# Patient Record
Sex: MALE | Race: WHITE | NOT HISPANIC OR LATINO
[De-identification: names, ages, dates, MRNs, and addresses within clinical notes are randomized per-mention and may not be internally consistent; named-entity substitution may affect disease eponyms.]

---

## 2020-04-16 ENCOUNTER — APPOINTMENT (OUTPATIENT)
Dept: ORTHOPEDIC SURGERY | Facility: CLINIC | Age: 60
End: 2020-04-16

## 2020-05-06 ENCOUNTER — APPOINTMENT (OUTPATIENT)
Dept: ORTHOPEDIC SURGERY | Facility: CLINIC | Age: 60
End: 2020-05-06

## 2020-05-13 ENCOUNTER — APPOINTMENT (OUTPATIENT)
Dept: ORTHOPEDIC SURGERY | Facility: CLINIC | Age: 60
End: 2020-05-13
Payer: MEDICARE

## 2020-05-13 VITALS
HEART RATE: 75 BPM | TEMPERATURE: 97.7 F | SYSTOLIC BLOOD PRESSURE: 143 MMHG | BODY MASS INDEX: 39.69 KG/M2 | DIASTOLIC BLOOD PRESSURE: 87 MMHG | HEIGHT: 69 IN | WEIGHT: 268 LBS

## 2020-05-13 DIAGNOSIS — M25.511 PAIN IN RIGHT SHOULDER: ICD-10-CM

## 2020-05-13 DIAGNOSIS — M75.81 OTHER SHOULDER LESIONS, RIGHT SHOULDER: ICD-10-CM

## 2020-05-13 PROCEDURE — 99204 OFFICE O/P NEW MOD 45 MIN: CPT

## 2020-05-13 PROCEDURE — 73030 X-RAY EXAM OF SHOULDER: CPT | Mod: RT

## 2020-05-27 ENCOUNTER — APPOINTMENT (OUTPATIENT)
Dept: ULTRASOUND IMAGING | Facility: CLINIC | Age: 60
End: 2020-05-27
Payer: MEDICARE

## 2020-05-27 ENCOUNTER — RESULT REVIEW (OUTPATIENT)
Age: 60
End: 2020-05-27

## 2020-05-27 ENCOUNTER — OUTPATIENT (OUTPATIENT)
Dept: OUTPATIENT SERVICES | Facility: HOSPITAL | Age: 60
LOS: 1 days | End: 2020-05-27
Payer: MEDICARE

## 2020-05-27 DIAGNOSIS — Z98.1 ARTHRODESIS STATUS: Chronic | ICD-10-CM

## 2020-05-27 DIAGNOSIS — Z09 ENCOUNTER FOR FOLLOW-UP EXAMINATION AFTER COMPLETED TREATMENT FOR CONDITIONS OTHER THAN MALIGNANT NEOPLASM: Chronic | ICD-10-CM

## 2020-05-27 DIAGNOSIS — M19.011 PRIMARY OSTEOARTHRITIS, RIGHT SHOULDER: ICD-10-CM

## 2020-05-27 DIAGNOSIS — Z98.89 OTHER SPECIFIED POSTPROCEDURAL STATES: Chronic | ICD-10-CM

## 2020-05-27 PROCEDURE — 20611 DRAIN/INJ JOINT/BURSA W/US: CPT

## 2020-05-27 PROCEDURE — 20611 DRAIN/INJ JOINT/BURSA W/US: CPT | Mod: RT

## 2020-12-04 ENCOUNTER — RX RENEWAL (OUTPATIENT)
Age: 60
End: 2020-12-04

## 2021-01-26 ENCOUNTER — APPOINTMENT (OUTPATIENT)
Dept: ORTHOPEDIC SURGERY | Facility: CLINIC | Age: 61
End: 2021-01-26
Payer: MEDICARE

## 2021-01-26 PROCEDURE — 73030 X-RAY EXAM OF SHOULDER: CPT | Mod: RT

## 2021-01-26 PROCEDURE — 99214 OFFICE O/P EST MOD 30 MIN: CPT

## 2021-01-27 NOTE — HISTORY OF PRESENT ILLNESS
[Worsening] : worsening [9] : an average pain level of 9/10 [8] : a minimum pain level of 8/10 [10] : a maximum pain level of 10/10 [Intermit.] : ~He/She~ states the symptoms seem to be intermittent [Lifting] : worsened by lifting [Rest] : relieved by rest [___ mths] : [unfilled] month(s) ago [de-identified] : Mr. BULL is a pleasant 60 year old male HD[L] who presents with Right shoulder pain. He has trouble with any over the head modalities as well as heavy lifting.  Pain is progressively worsened since October 2019.  He has difficulty elevating shoulder above 90 degrees flexion or abduction secondary to pain and stiffness.  Pain is localized to the anterolateral portion of the shoulder radiates down his arm.  Pain wakes him up at night.  He also admits to numbness that travels down his anterior arm to his elbow. He denies performing PT for this issue.  He also denies taking medications recently.  Patient received an US guided cortisone injection which did not provide him any relief.  He also took Diclofenac which did not provide him much relief either.\par \par Currently, the patient has failed all conservative treatment options and is considering surgical intervention.\par  [NSAIDs] : not relieved by nonsteroidal anti-inflammatory drugs [de-identified] : lying down

## 2021-01-27 NOTE — PHYSICAL EXAM
[de-identified] : Physical Exam:\par General: Well appearing, no acute distress, A&Ox3\par Neurologic: No focal deficits\par Head: NCAT without abrasions, lacerations, or ecchymosis to head, face, or scalp\par Eyes: No scleral icterus, no gross abnormalities\par Respiratory: Equal chest wall expansion bilaterally, no accessory muscle use\par Lymphatic: No lymphadenopathy palpated\par Skin: Warm and dry\par Psychiatric: Normal mood and affect\par \par C-Spine\par Palpation: Tenderness to paraspinal muscles and trapezius muscle\par ROM: side bending, symmetrical. Pain with extension and flexion of the neck.\par Reflexes: C5-7 [normal]\par \par Right Shoulder\par ·	Inspection/Palpation: No tenderness, no crepitus, no deformities\par ·	Range of Motion: no crepitus with ROM; Active ; ER at side 10; IR to L1; Passive , ER at side 20, IR to L1\par ·	Strength: forward elevation in scapular plane [4/5], internal rotation [4/5], external rotation [4/5], adduction [4/5] and abduction [4/5]\par ·	Stability: no joint instability on provocative testing\par ·	Tests: Mcdonald test positive, Neer positive, positive drop arm test secondary to pain, bear hug test positive, Napolean sign POS, cross arm adduction positive, lift off sign positive, hornblowers sign POS, speeds test negative, Yergason's test negative, Epperson's Active Compression test negative, whipple test positive, bicep's load II test negative\par \par Left Shoulder\par ·	Inspection/Palpation: no tenderness, swelling or deformities\par ·	Range of Motion: full and painless in all planes, no crepitus\par ·	Strength: forward elevation in scapular plane 5/5, internal rotation 5/5, external rotation 5/5, adduction 5/5 and abduction 5/5\par ·	Stability: no joint instability on provocative testing\par Tests: Mcdonald test negative, Neer sign negative, negative drop arm test secondary to pain, bear hug test negative, Napolean sign negative, cross arm adduction negative, lift off sign positive, hornblowers sign negative, speeds test negative, Yergason's test negative, no bicipital groove tenderness, Epperson's Active Compression test negative [de-identified] : The following radiographs were ordered and read by me during this patients visit. I reviewed each radiograph in detail with the patient and discussed the findings as highlighted below. \par \par 4 views of the right shoulder show no fracture, dislocation or bony lesions. There is evidence of degenerative change in the glenohumeral and acromioclavicular joints with joint space narrowing and osteophyte formation.

## 2021-01-27 NOTE — DISCUSSION/SUMMARY
[de-identified] : Mr. BULL is a pleasant 60 year old male HD[L] who presents with chronic right shoulder pain secondary to OA and RTC tendonitis. Patient has tried and failed all conservative treatment options. Patient is considering surgical treatment . All the risks and benefits of an arthroscopic shoulder replacement were discussed with the patient. Risks include, but are not limited to, infection, bleeding, dislocation, nerve injury, blood clots and other possible medical complications, which were discussed with the patient. Also the risks of possible readmission were discussed with the patient. Patient completely understands all risks and benefits. The need for postoperative DVT prophylaxis discussed with the patient as well. The patient was given no assurances that all the symptoms will be alleviated. Patient completely understands all this. He has been advised to get medical clearance before the procedure, in order to decrease complication risk. Patient was also advised to attend a joint replacement education class.  I had my surgical coordinator meet with the patient to discuss surgery dates.\par \par We will discuss his MRI and CT scan results with him to determine if a reverse versus total shoulder replacement is indicated. MR will allow us to evaluate for RTC tearing versus arthropathy and CT scan will evaluate degree of bony deformity. He agrees with the above plan and all questions were answered.\par \par

## 2021-01-27 NOTE — REVIEW OF SYSTEMS
[Joint Pain] : joint pain [Joint Stiffness] : joint stiffness [Joint Swelling] : joint swelling [Negative] : Heme/Lymph [[id number]] : right shoulder

## 2021-02-04 ENCOUNTER — APPOINTMENT (OUTPATIENT)
Dept: MRI IMAGING | Facility: CLINIC | Age: 61
End: 2021-02-04
Payer: MEDICARE

## 2021-02-04 ENCOUNTER — RESULT REVIEW (OUTPATIENT)
Age: 61
End: 2021-02-04

## 2021-02-04 ENCOUNTER — APPOINTMENT (OUTPATIENT)
Dept: CT IMAGING | Facility: CLINIC | Age: 61
End: 2021-02-04
Payer: MEDICARE

## 2021-02-04 ENCOUNTER — OUTPATIENT (OUTPATIENT)
Dept: OUTPATIENT SERVICES | Facility: HOSPITAL | Age: 61
LOS: 1 days | End: 2021-02-04
Payer: MEDICARE

## 2021-02-04 DIAGNOSIS — Z00.8 ENCOUNTER FOR OTHER GENERAL EXAMINATION: ICD-10-CM

## 2021-02-04 DIAGNOSIS — Z98.1 ARTHRODESIS STATUS: Chronic | ICD-10-CM

## 2021-02-04 DIAGNOSIS — Z98.89 OTHER SPECIFIED POSTPROCEDURAL STATES: Chronic | ICD-10-CM

## 2021-02-04 DIAGNOSIS — Z09 ENCOUNTER FOR FOLLOW-UP EXAMINATION AFTER COMPLETED TREATMENT FOR CONDITIONS OTHER THAN MALIGNANT NEOPLASM: Chronic | ICD-10-CM

## 2021-02-04 PROCEDURE — 73200 CT UPPER EXTREMITY W/O DYE: CPT | Mod: 26,RT,MH

## 2021-02-04 PROCEDURE — 73221 MRI JOINT UPR EXTREM W/O DYE: CPT | Mod: 26,RT,MH

## 2021-02-04 PROCEDURE — 73200 CT UPPER EXTREMITY W/O DYE: CPT

## 2021-02-04 PROCEDURE — 73221 MRI JOINT UPR EXTREM W/O DYE: CPT

## 2021-02-19 ENCOUNTER — NON-APPOINTMENT (OUTPATIENT)
Age: 61
End: 2021-02-19

## 2021-03-03 ENCOUNTER — OUTPATIENT (OUTPATIENT)
Dept: OUTPATIENT SERVICES | Facility: HOSPITAL | Age: 61
LOS: 1 days | End: 2021-03-03
Payer: MEDICARE

## 2021-03-03 VITALS
OXYGEN SATURATION: 100 % | SYSTOLIC BLOOD PRESSURE: 137 MMHG | RESPIRATION RATE: 16 BRPM | HEART RATE: 72 BPM | WEIGHT: 270.07 LBS | HEIGHT: 66 IN | TEMPERATURE: 98 F | DIASTOLIC BLOOD PRESSURE: 89 MMHG

## 2021-03-03 DIAGNOSIS — Z98.890 OTHER SPECIFIED POSTPROCEDURAL STATES: Chronic | ICD-10-CM

## 2021-03-03 DIAGNOSIS — Z29.9 ENCOUNTER FOR PROPHYLACTIC MEASURES, UNSPECIFIED: ICD-10-CM

## 2021-03-03 DIAGNOSIS — Z96.651 PRESENCE OF RIGHT ARTIFICIAL KNEE JOINT: Chronic | ICD-10-CM

## 2021-03-03 DIAGNOSIS — Z98.89 OTHER SPECIFIED POSTPROCEDURAL STATES: Chronic | ICD-10-CM

## 2021-03-03 DIAGNOSIS — M12.811 OTHER SPECIFIC ARTHROPATHIES, NOT ELSEWHERE CLASSIFIED, RIGHT SHOULDER: ICD-10-CM

## 2021-03-03 DIAGNOSIS — Z09 ENCOUNTER FOR FOLLOW-UP EXAMINATION AFTER COMPLETED TREATMENT FOR CONDITIONS OTHER THAN MALIGNANT NEOPLASM: Chronic | ICD-10-CM

## 2021-03-03 DIAGNOSIS — Z01.818 ENCOUNTER FOR OTHER PREPROCEDURAL EXAMINATION: ICD-10-CM

## 2021-03-03 DIAGNOSIS — Z98.1 ARTHRODESIS STATUS: Chronic | ICD-10-CM

## 2021-03-03 LAB
A1C WITH ESTIMATED AVERAGE GLUCOSE RESULT: 5.6 % — SIGNIFICANT CHANGE UP (ref 4–5.6)
ALBUMIN SERPL ELPH-MCNC: 3.9 G/DL — SIGNIFICANT CHANGE UP (ref 3.3–5)
ANION GAP SERPL CALC-SCNC: 1 MMOL/L — LOW (ref 5–17)
APTT BLD: 32.4 SEC — SIGNIFICANT CHANGE UP (ref 27.5–35.5)
BASOPHILS # BLD AUTO: 0.05 K/UL — SIGNIFICANT CHANGE UP (ref 0–0.2)
BASOPHILS NFR BLD AUTO: 0.7 % — SIGNIFICANT CHANGE UP (ref 0–2)
BUN SERPL-MCNC: 17 MG/DL — SIGNIFICANT CHANGE UP (ref 7–23)
CALCIUM SERPL-MCNC: 9.4 MG/DL — SIGNIFICANT CHANGE UP (ref 8.5–10.1)
CHLORIDE SERPL-SCNC: 105 MMOL/L — SIGNIFICANT CHANGE UP (ref 96–108)
CO2 SERPL-SCNC: 32 MMOL/L — HIGH (ref 22–31)
CREAT SERPL-MCNC: 0.84 MG/DL — SIGNIFICANT CHANGE UP (ref 0.5–1.3)
EOSINOPHIL # BLD AUTO: 0.1 K/UL — SIGNIFICANT CHANGE UP (ref 0–0.5)
EOSINOPHIL NFR BLD AUTO: 1.4 % — SIGNIFICANT CHANGE UP (ref 0–6)
ESTIMATED AVERAGE GLUCOSE: 114 MG/DL — SIGNIFICANT CHANGE UP (ref 68–114)
GLUCOSE SERPL-MCNC: 108 MG/DL — HIGH (ref 70–99)
HCT VFR BLD CALC: 43.6 % — SIGNIFICANT CHANGE UP (ref 39–50)
HGB BLD-MCNC: 14.2 G/DL — SIGNIFICANT CHANGE UP (ref 13–17)
IMM GRANULOCYTES NFR BLD AUTO: 0.4 % — SIGNIFICANT CHANGE UP (ref 0–1.5)
INR BLD: 1.04 RATIO — SIGNIFICANT CHANGE UP (ref 0.88–1.16)
LYMPHOCYTES # BLD AUTO: 1.71 K/UL — SIGNIFICANT CHANGE UP (ref 1–3.3)
LYMPHOCYTES # BLD AUTO: 24.1 % — SIGNIFICANT CHANGE UP (ref 13–44)
MCHC RBC-ENTMCNC: 31.2 PG — SIGNIFICANT CHANGE UP (ref 27–34)
MCHC RBC-ENTMCNC: 32.6 GM/DL — SIGNIFICANT CHANGE UP (ref 32–36)
MCV RBC AUTO: 95.8 FL — SIGNIFICANT CHANGE UP (ref 80–100)
MONOCYTES # BLD AUTO: 0.46 K/UL — SIGNIFICANT CHANGE UP (ref 0–0.9)
MONOCYTES NFR BLD AUTO: 6.5 % — SIGNIFICANT CHANGE UP (ref 2–14)
MRSA PCR RESULT.: SIGNIFICANT CHANGE UP
NEUTROPHILS # BLD AUTO: 4.75 K/UL — SIGNIFICANT CHANGE UP (ref 1.8–7.4)
NEUTROPHILS NFR BLD AUTO: 66.9 % — SIGNIFICANT CHANGE UP (ref 43–77)
PLATELET # BLD AUTO: 220 K/UL — SIGNIFICANT CHANGE UP (ref 150–400)
POTASSIUM SERPL-MCNC: 4.4 MMOL/L — SIGNIFICANT CHANGE UP (ref 3.5–5.3)
POTASSIUM SERPL-SCNC: 4.4 MMOL/L — SIGNIFICANT CHANGE UP (ref 3.5–5.3)
PROTHROM AB SERPL-ACNC: 12.1 SEC — SIGNIFICANT CHANGE UP (ref 10.6–13.6)
RBC # BLD: 4.55 M/UL — SIGNIFICANT CHANGE UP (ref 4.2–5.8)
RBC # FLD: 12.5 % — SIGNIFICANT CHANGE UP (ref 10.3–14.5)
S AUREUS DNA NOSE QL NAA+PROBE: DETECTED
SODIUM SERPL-SCNC: 138 MMOL/L — SIGNIFICANT CHANGE UP (ref 135–145)
WBC # BLD: 7.1 K/UL — SIGNIFICANT CHANGE UP (ref 3.8–10.5)
WBC # FLD AUTO: 7.1 K/UL — SIGNIFICANT CHANGE UP (ref 3.8–10.5)

## 2021-03-03 PROCEDURE — 80048 BASIC METABOLIC PNL TOTAL CA: CPT

## 2021-03-03 PROCEDURE — G0463: CPT | Mod: 25

## 2021-03-03 PROCEDURE — 85730 THROMBOPLASTIN TIME PARTIAL: CPT

## 2021-03-03 PROCEDURE — 36415 COLL VENOUS BLD VENIPUNCTURE: CPT

## 2021-03-03 PROCEDURE — 87640 STAPH A DNA AMP PROBE: CPT

## 2021-03-03 PROCEDURE — 82040 ASSAY OF SERUM ALBUMIN: CPT

## 2021-03-03 PROCEDURE — 85025 COMPLETE CBC W/AUTO DIFF WBC: CPT

## 2021-03-03 PROCEDURE — 93005 ELECTROCARDIOGRAM TRACING: CPT

## 2021-03-03 PROCEDURE — 87641 MR-STAPH DNA AMP PROBE: CPT

## 2021-03-03 PROCEDURE — 93010 ELECTROCARDIOGRAM REPORT: CPT

## 2021-03-03 PROCEDURE — 85610 PROTHROMBIN TIME: CPT

## 2021-03-03 PROCEDURE — 83036 HEMOGLOBIN GLYCOSYLATED A1C: CPT

## 2021-03-03 PROCEDURE — 86900 BLOOD TYPING SEROLOGIC ABO: CPT

## 2021-03-03 PROCEDURE — 86901 BLOOD TYPING SEROLOGIC RH(D): CPT

## 2021-03-03 PROCEDURE — 86850 RBC ANTIBODY SCREEN: CPT

## 2021-03-03 NOTE — H&P PST ADULT - NSICDXPASTSURGICALHX_GEN_ALL_CORE_FT
PAST SURGICAL HISTORY:  H/O cardiac catheterization 7-8 yr ago---normal    H/O umbilical hernia repair 2005    S/P inguinal hernia repair right--1988    S/P lumbar fusion L3-L4  ---2015    S/P total knee replacement, right 2018

## 2021-03-03 NOTE — H&P PST ADULT - HISTORY OF PRESENT ILLNESS
60 y.o obese male presents for PST with hx of right shoulder pain. Patient retired , injury  60 y.o obese male presents for PST with hx of right shoulder pain. 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 He has had at least two injuries on his right shoulder over the years. Patient reports right shoulder pain for years but has progressively worsened over the past year, severely limiting his ROM . He has followed with ortho and states diagnostics revealed bone spurs, advanced arthritis. He has treated pain conservatively with no relief. Pain currently impacting his daily activities and sleep. Patient opting for surgical intervention. Scheduled for Right Total Shoulder Replacement

## 2021-03-03 NOTE — H&P PST ADULT - ATTENDING COMMENTS
Orthopedic Sports Attending:    Agree with above resident/PA note.  Note edited where necessary.      Patient seen and examined at bedside. Discussed the risks, complications, benefits and alternatives of care. Confirmed procedure and site. Patient fully understands and would like to proceed with surgery.    Nilton Chaudhary, DO  Orthopaedic Surgery

## 2021-03-03 NOTE — H&P PST ADULT - NSICDXPASTMEDICALHX_GEN_ALL_CORE_FT
PAST MEDICAL HISTORY:  Arthritis     Asthma WTC responder--followed by [facility] monitoring center    GERD (gastroesophageal reflux disease)     H/O melanoma excision Left thigh    H/O squamous cell carcinoma excision left hand    History of Thomson's esophagus     Hyperlipidemia     Sleep apnea CPAP

## 2021-03-03 NOTE — H&P PST ADULT - RESPIRATORY AND THORAX COMMENTS
WTC responder, has developed Asthma, followed by pulmonary Dr Agee every 4 months & [facility] monitoring center- on daily inhalers

## 2021-03-03 NOTE — H&P PST ADULT - TEACHING/LEARNING LEARNING PREFERENCES
audio/computer/internet/group instruction/individual instruction/pictorial/skill demonstration/verbal instruction/video

## 2021-03-03 NOTE — H&P PST ADULT - ASSESSMENT
60 y.o male scheduled for  60 y.o male scheduled for  Right Total Shoulder Replacement   Plan  1. Stop all NSAIDS, herbal supplements and vitamins for 7 days.  2. NPO at midnight.  3. Take the following medications Omeprazole  with small sips of water on the morning of your procedure/surgery. Use Advair   4. Use EZ sponges as directed  5. Use mupirocin as directed  6. Labs, EKG as per surgeon  7. PMD Dr Sher Louise visit for optimization prior to surgery as per surgeon.  8. COVID swab appt: 3/7/2021    Denies travel outside of state or country in the last 14 days.   Denies contact with known Coronavirus positive person.  Denies fever, chills, cough, congestion, runny nose, SOB or difficulty breathing, fatigue, muscle or body ache, headache. loss of taste or smell, N/V/D.    CAPRINI SCORE    AGE RELATED RISK FACTORS                                                       MOBILITY RELATED FACTORS  [ ] Age 41-60 years                                            (1 Point)                  [ ] Bed rest                                                        (1 Point)  [x ] Age: 61-74 years                                           (2 Points)                [ ] Plaster cast                                                   (2 Points)  [ ] Age= 75 years                                              (3 Points)                 [ ] Bed bound for more than 72 hours                   (2 Points)    DISEASE RELATED RISK FACTORS                                               GENDER SPECIFIC FACTORS  [x Edema in the lower extremities                       (1 Point)                  [ ] Pregnancy                                                     (1 Point)  [ ] Varicose veins                                               (1 Point)                  [ ] Post-partum < 6 weeks                                   (1 Point)             [x ] BMI > 25 Kg/m2                                            (1 Point)                  [ ] Hormonal therapy  or oral contraception            (1 Point)                 [ ] Sepsis (in the previous month)                        (1 Point)                  [ ] History of pregnancy complications  [ ] Pneumonia or serious lung disease                                               [ ] Unexplained or recurrent                       (1 Point)           (in the previous month)                               (1 Point)  [ ] Abnormal pulmonary function test                     (1 Point)                 SURGERY RELATED RISK FACTORS  [ ] Acute myocardial infarction                              (1 Point)                 [ ]  Section                                            (1 Point)  [ ] Congestive heart failure (in the previous month)  (1 Point)                 [ ] Minor surgery                                                 (1 Point)   [ ] Inflammatory bowel disease                             (1 Point)                 [ ] Arthroscopic surgery                                        (2 Points)  [ ] Central venous access                                    (2 Points)                [ ] General surgery lasting more than 45 minutes   (2 Points)       [ ] Stroke (in the previous month)                          (5 Points)               [x ] Elective arthroplasty                                        (5 Points)                                                                                                                                               HEMATOLOGY RELATED FACTORS                                                 TRAUMA RELATED RISK FACTORS  [ ] Prior episodes of VTE                                     (3 Points)                 [ ] Fracture of the hip, pelvis, or leg                       (5 Points)  [ ] Positive family history for VTE                         (3 Points)                 [ ] Acute spinal cord injury (in the previous month)  (5 Points)  [ ] Prothrombin 39936 A                                      (3 Points)                 [ ] Paralysis  (less than 1 month)                          (5 Points)  [ ] Factor V Leiden                                             (3 Points)                 [ ] Multiple Trauma within 1 month                         (5 Points)  [ ] Lupus anticoagulants                                     (3 Points)                                                           [ ] Anticardiolipin antibodies                                (3 Points)                                                       [ ] High homocysteine in the blood                      (3 Points)                                             [ ] Other congenital or acquired thrombophilia       (3 Points)                                                [ ] Heparin induced thrombocytopenia                  (3 Points)                                          Total Score [    9      ]    The Caprini score indicates that this patient is at high risk for a VTE event (score > = 6).    Ssurgical patients in this group will benefit from both pharmacologic prophylaxis and intermittent compression devices.    The surgical team will determine the balance between VTE risk and bleeding risk, and other clinical considerations.

## 2021-03-03 NOTE — H&P PST ADULT - NSICDXFAMILYHX_GEN_ALL_CORE_FT
FAMILY HISTORY:  Mother  Still living? Yes, Estimated age: 71-80  Family history of hyperlipidemia, Age at diagnosis: Age Unknown

## 2021-03-03 NOTE — H&P PST ADULT - SKIN/BREAST COMMENTS
Follows with Dermatology regularly and [facility]--hx melanoma & squamous cell carcinoma

## 2021-03-04 DIAGNOSIS — Z01.818 ENCOUNTER FOR OTHER PREPROCEDURAL EXAMINATION: ICD-10-CM

## 2021-03-04 DIAGNOSIS — M12.811 OTHER SPECIFIC ARTHROPATHIES, NOT ELSEWHERE CLASSIFIED, RIGHT SHOULDER: ICD-10-CM

## 2021-03-07 ENCOUNTER — APPOINTMENT (OUTPATIENT)
Dept: DISASTER EMERGENCY | Facility: CLINIC | Age: 61
End: 2021-03-07

## 2021-03-07 LAB — SARS-COV-2 N GENE NPH QL NAA+PROBE: NOT DETECTED

## 2021-03-10 ENCOUNTER — OUTPATIENT (OUTPATIENT)
Dept: INPATIENT UNIT | Facility: HOSPITAL | Age: 61
LOS: 1 days | Discharge: HOME | End: 2021-03-10
Payer: MEDICARE

## 2021-03-10 ENCOUNTER — APPOINTMENT (OUTPATIENT)
Dept: ORTHOPEDIC SURGERY | Facility: HOSPITAL | Age: 61
End: 2021-03-10

## 2021-03-10 ENCOUNTER — RESULT REVIEW (OUTPATIENT)
Age: 61
End: 2021-03-10

## 2021-03-10 VITALS
OXYGEN SATURATION: 100 % | RESPIRATION RATE: 16 BRPM | DIASTOLIC BLOOD PRESSURE: 80 MMHG | HEIGHT: 66 IN | WEIGHT: 270.07 LBS | SYSTOLIC BLOOD PRESSURE: 130 MMHG | TEMPERATURE: 97 F | HEART RATE: 66 BPM

## 2021-03-10 DIAGNOSIS — Z98.89 OTHER SPECIFIED POSTPROCEDURAL STATES: Chronic | ICD-10-CM

## 2021-03-10 DIAGNOSIS — M19.011 PRIMARY OSTEOARTHRITIS, RIGHT SHOULDER: ICD-10-CM

## 2021-03-10 DIAGNOSIS — Z98.1 ARTHRODESIS STATUS: Chronic | ICD-10-CM

## 2021-03-10 DIAGNOSIS — Z98.890 OTHER SPECIFIED POSTPROCEDURAL STATES: Chronic | ICD-10-CM

## 2021-03-10 DIAGNOSIS — Z96.651 PRESENCE OF RIGHT ARTIFICIAL KNEE JOINT: Chronic | ICD-10-CM

## 2021-03-10 DIAGNOSIS — M12.811 OTHER SPECIFIC ARTHROPATHIES, NOT ELSEWHERE CLASSIFIED, RIGHT SHOULDER: ICD-10-CM

## 2021-03-10 LAB
ANION GAP SERPL CALC-SCNC: 4 MMOL/L — LOW (ref 5–17)
BUN SERPL-MCNC: 12 MG/DL — SIGNIFICANT CHANGE UP (ref 7–23)
CALCIUM SERPL-MCNC: 8.6 MG/DL — SIGNIFICANT CHANGE UP (ref 8.5–10.1)
CHLORIDE SERPL-SCNC: 104 MMOL/L — SIGNIFICANT CHANGE UP (ref 96–108)
CO2 SERPL-SCNC: 29 MMOL/L — SIGNIFICANT CHANGE UP (ref 22–31)
CREAT SERPL-MCNC: 0.98 MG/DL — SIGNIFICANT CHANGE UP (ref 0.5–1.3)
GLUCOSE SERPL-MCNC: 129 MG/DL — HIGH (ref 70–99)
HCT VFR BLD CALC: 39.9 % — SIGNIFICANT CHANGE UP (ref 39–50)
HGB BLD-MCNC: 13.3 G/DL — SIGNIFICANT CHANGE UP (ref 13–17)
MCHC RBC-ENTMCNC: 31.4 PG — SIGNIFICANT CHANGE UP (ref 27–34)
MCHC RBC-ENTMCNC: 33.3 GM/DL — SIGNIFICANT CHANGE UP (ref 32–36)
MCV RBC AUTO: 94.3 FL — SIGNIFICANT CHANGE UP (ref 80–100)
PLATELET # BLD AUTO: 180 K/UL — SIGNIFICANT CHANGE UP (ref 150–400)
POTASSIUM SERPL-MCNC: 4.5 MMOL/L — SIGNIFICANT CHANGE UP (ref 3.5–5.3)
POTASSIUM SERPL-SCNC: 4.5 MMOL/L — SIGNIFICANT CHANGE UP (ref 3.5–5.3)
RBC # BLD: 4.23 M/UL — SIGNIFICANT CHANGE UP (ref 4.2–5.8)
RBC # FLD: 12.2 % — SIGNIFICANT CHANGE UP (ref 10.3–14.5)
SODIUM SERPL-SCNC: 137 MMOL/L — SIGNIFICANT CHANGE UP (ref 135–145)
WBC # BLD: 9.16 K/UL — SIGNIFICANT CHANGE UP (ref 3.8–10.5)
WBC # FLD AUTO: 9.16 K/UL — SIGNIFICANT CHANGE UP (ref 3.8–10.5)

## 2021-03-10 PROCEDURE — 99204 OFFICE O/P NEW MOD 45 MIN: CPT

## 2021-03-10 PROCEDURE — 73030 X-RAY EXAM OF SHOULDER: CPT | Mod: RT

## 2021-03-10 PROCEDURE — 97163 PT EVAL HIGH COMPLEX 45 MIN: CPT | Mod: GP

## 2021-03-10 PROCEDURE — 36415 COLL VENOUS BLD VENIPUNCTURE: CPT

## 2021-03-10 PROCEDURE — 82962 GLUCOSE BLOOD TEST: CPT

## 2021-03-10 PROCEDURE — C1776: CPT

## 2021-03-10 PROCEDURE — 85027 COMPLETE CBC AUTOMATED: CPT

## 2021-03-10 PROCEDURE — 97116 GAIT TRAINING THERAPY: CPT | Mod: GP

## 2021-03-10 PROCEDURE — C9399: CPT

## 2021-03-10 PROCEDURE — 23472 RECONSTRUCT SHOULDER JOINT: CPT | Mod: RT

## 2021-03-10 PROCEDURE — 73030 X-RAY EXAM OF SHOULDER: CPT | Mod: 26,RT

## 2021-03-10 PROCEDURE — 80048 BASIC METABOLIC PNL TOTAL CA: CPT

## 2021-03-10 PROCEDURE — C1713: CPT

## 2021-03-10 RX ORDER — OXYCODONE HYDROCHLORIDE 5 MG/1
1 TABLET ORAL
Qty: 30 | Refills: 0
Start: 2021-03-10 | End: 2021-03-14

## 2021-03-10 RX ORDER — FOLIC ACID 0.8 MG
1 TABLET ORAL DAILY
Refills: 0 | Status: DISCONTINUED | OUTPATIENT
Start: 2021-03-10 | End: 2021-03-11

## 2021-03-10 RX ORDER — SODIUM CHLORIDE 9 MG/ML
1000 INJECTION, SOLUTION INTRAVENOUS
Refills: 0 | Status: DISCONTINUED | OUTPATIENT
Start: 2021-03-10 | End: 2021-03-10

## 2021-03-10 RX ORDER — ATORVASTATIN CALCIUM 80 MG/1
20 TABLET, FILM COATED ORAL AT BEDTIME
Refills: 0 | Status: DISCONTINUED | OUTPATIENT
Start: 2021-03-10 | End: 2021-03-11

## 2021-03-10 RX ORDER — MAGNESIUM HYDROXIDE 400 MG/1
30 TABLET, CHEWABLE ORAL DAILY
Refills: 0 | Status: DISCONTINUED | OUTPATIENT
Start: 2021-03-10 | End: 2021-03-11

## 2021-03-10 RX ORDER — ONDANSETRON 8 MG/1
4 TABLET, FILM COATED ORAL EVERY 6 HOURS
Refills: 0 | Status: DISCONTINUED | OUTPATIENT
Start: 2021-03-10 | End: 2021-03-11

## 2021-03-10 RX ORDER — DOCUSATE SODIUM 100 MG
1 CAPSULE ORAL
Qty: 20 | Refills: 0
Start: 2021-03-10 | End: 2021-04-08

## 2021-03-10 RX ORDER — OXYCODONE HYDROCHLORIDE 5 MG/1
5 TABLET ORAL
Refills: 0 | Status: DISCONTINUED | OUTPATIENT
Start: 2021-03-10 | End: 2021-03-11

## 2021-03-10 RX ORDER — PANTOPRAZOLE SODIUM 20 MG/1
40 TABLET, DELAYED RELEASE ORAL ONCE
Refills: 0 | Status: COMPLETED | OUTPATIENT
Start: 2021-03-10 | End: 2021-03-10

## 2021-03-10 RX ORDER — OXYCODONE HYDROCHLORIDE 5 MG/1
5 TABLET ORAL ONCE
Refills: 0 | Status: DISCONTINUED | OUTPATIENT
Start: 2021-03-10 | End: 2021-03-10

## 2021-03-10 RX ORDER — SODIUM CHLORIDE 9 MG/ML
1000 INJECTION, SOLUTION INTRAVENOUS
Refills: 0 | Status: DISCONTINUED | OUTPATIENT
Start: 2021-03-10 | End: 2021-03-11

## 2021-03-10 RX ORDER — FENTANYL CITRATE 50 UG/ML
50 INJECTION INTRAVENOUS
Refills: 0 | Status: DISCONTINUED | OUTPATIENT
Start: 2021-03-10 | End: 2021-03-10

## 2021-03-10 RX ORDER — OXYCODONE HYDROCHLORIDE 5 MG/1
2.5 TABLET ORAL
Refills: 0 | Status: DISCONTINUED | OUTPATIENT
Start: 2021-03-10 | End: 2021-03-11

## 2021-03-10 RX ORDER — ASPIRIN/CALCIUM CARB/MAGNESIUM 324 MG
325 TABLET ORAL DAILY
Refills: 0 | Status: DISCONTINUED | OUTPATIENT
Start: 2021-03-11 | End: 2021-03-11

## 2021-03-10 RX ORDER — SENNA PLUS 8.6 MG/1
2 TABLET ORAL AT BEDTIME
Refills: 0 | Status: DISCONTINUED | OUTPATIENT
Start: 2021-03-10 | End: 2021-03-11

## 2021-03-10 RX ORDER — BUDESONIDE AND FORMOTEROL FUMARATE DIHYDRATE 160; 4.5 UG/1; UG/1
2 AEROSOL RESPIRATORY (INHALATION)
Refills: 0 | Status: DISCONTINUED | OUTPATIENT
Start: 2021-03-10 | End: 2021-03-11

## 2021-03-10 RX ORDER — ASPIRIN/CALCIUM CARB/MAGNESIUM 324 MG
1 TABLET ORAL
Qty: 14 | Refills: 0
Start: 2021-03-10 | End: 2021-03-23

## 2021-03-10 RX ORDER — ACETAMINOPHEN 500 MG
650 TABLET ORAL EVERY 6 HOURS
Refills: 0 | Status: DISCONTINUED | OUTPATIENT
Start: 2021-03-10 | End: 2021-03-11

## 2021-03-10 RX ORDER — PANTOPRAZOLE SODIUM 20 MG/1
40 TABLET, DELAYED RELEASE ORAL DAILY
Refills: 0 | Status: DISCONTINUED | OUTPATIENT
Start: 2021-03-10 | End: 2021-03-11

## 2021-03-10 RX ORDER — ASCORBIC ACID 60 MG
500 TABLET,CHEWABLE ORAL
Refills: 0 | Status: DISCONTINUED | OUTPATIENT
Start: 2021-03-10 | End: 2021-03-11

## 2021-03-10 RX ORDER — ONDANSETRON 8 MG/1
4 TABLET, FILM COATED ORAL ONCE
Refills: 0 | Status: DISCONTINUED | OUTPATIENT
Start: 2021-03-10 | End: 2021-03-10

## 2021-03-10 RX ORDER — ALBUTEROL 90 UG/1
2 AEROSOL, METERED ORAL EVERY 4 HOURS
Refills: 0 | Status: DISCONTINUED | OUTPATIENT
Start: 2021-03-10 | End: 2021-03-11

## 2021-03-10 RX ORDER — ONDANSETRON 8 MG/1
1 TABLET, FILM COATED ORAL
Qty: 20 | Refills: 0
Start: 2021-03-10 | End: 2021-03-19

## 2021-03-10 RX ADMIN — ATORVASTATIN CALCIUM 20 MILLIGRAM(S): 80 TABLET, FILM COATED ORAL at 23:03

## 2021-03-10 RX ADMIN — PANTOPRAZOLE SODIUM 40 MILLIGRAM(S): 20 TABLET, DELAYED RELEASE ORAL at 11:31

## 2021-03-10 NOTE — PROGRESS NOTE ADULT - SUBJECTIVE AND OBJECTIVE BOX
Orthopedics Post-op Check    POD 0 Right total shoulder arthroplasty  Pt Seen and Examined. Pain is controlled. Feeling well overall. No nausea or vomiting.     Vital Signs Last 24 Hrs  T(C): 36.3 (03-10-21 @ 17:35), Max: 36.3 (03-10-21 @ 17:35)  T(F): 97.4 (03-10-21 @ 17:35), Max: 97.4 (03-10-21 @ 17:35)  HR: 72 (03-10-21 @ 19:00) (66 - 73)  BP: 111/65 (03-10-21 @ 19:00) (104/48 - 130/80)  BP(mean): --  RR: 14 (03-10-21 @ 19:00) (14 - 20)  SpO2: 99% (03-10-21 @ 19:00) (92% - 100%)                        13.3   9.16  )-----------( 180      ( 10 Mar 2021 17:51 )             39.9     10 Mar 2021 17:51    137    |  104    |  12     ----------------------------<  129    4.5     |  29     |  0.98     Ca    8.6        10 Mar 2021 17:51          Exam:  NAD AAOx3  Dressing clean and dry  Patient s/p block in OR continues to have decreased sensation from deltoid through wrist  unable to extend wrist / thumb likely 2/2 block   will continue to monitor   +Radial pulse   Calf Soft NT    A/P:   60yMale s/p R Total shoulder arthroplasty     -Pain control  -DVT/PE ppx  - NWB RUE no ROM in sling   -Med Mgmt  - FU Labs  - D/C Planning

## 2021-03-10 NOTE — ASU DISCHARGE PLAN (ADULT/PEDIATRIC) - ASU DC SPECIAL INSTRUCTIONSFT
PRESCRIPTIONS:   Pain- An eRx will be sent elecronically to your pharmacy for  before DC or the day of.  Oxycodone is prescrived for severe pain. Wean off the Oxycodone as soon as your can. Additionally you can take Extra Strength Tylenol with the Oxycodone. Also Mortin/ibuprofen is ok (unless you have kidney disease or GI bleeding in past).    Blood Clot Prevention- Aspirin EC 325mg take once a day for two weeks.    Stool Softener- We also recommend Miralax  stool softener which is over the counter to take daily while you are on Percocet.    If you need any refills on your medications, please call Dr. Chaudhary’s office when you have a 3-day supply left. Some of the medications (narcotics) cannot be called into a pharmacy and the prescription will need to be picked up at the office or mailed to you. If you were taking other medications for blood pressure, heart problems etc., you should discuss this with your family physician.     BANDAGE/INCISION CARE : A two layer water-resistant dressing is applied during surgery. You can remove the outer layer 3 days after surgery (Post-op Day 3) or sooner if it gets wet. The inner layer will stay on until your first post-operative appointment. The inner dressing is waterproof so you may shower immediately but avoid soaking in bathtub or swimming pool.     Once your dressing is removed, you may leave your incision open to air or apply a dry gauze dressing. The sutures are dissolvable and Dermabond (surgical glue) is applied. Skin glue will gradually come off as incision heals. If you notice redness, swelling or drainage around incision, contact Dr. Chaudhary’s office immediately.      SLING: For the first two to six weeks after your surgery, you will be wearing your sling the majority of time, coming out of it to complete exercises given to you by the therapists and Dr. Chaudhary upon your discharge from the hospital. After your first post-operative visit, Dr. Chaudhary may suggest you come out of your sling during the day to do activities in front of you.  Wearing the sling alerts others around you to be cautious and to avoid accidentally striking your arm. Also, during this time do not use your arm to pull or push yourself out of bed or out of a chair.     ACTIVITY: NO shoulder ROM. Bend and straighten elbow a few times daily so it doesnt get stiff. Walk Plenty. No sitting around. NO lifting with operative arm. See Detailed instructions in your packet.    ICE: Ice plenty and often during the first 2 weeks. Protect skin from direct ice using a towel or pillow case barrier.    PHYSICAL THERAPY: Dr. Chaudhary prefers that his patients do NOT do formal Physical Therapy after Total Shoulder Replacement. He has found that most patients do well with progressing their motion and strength through normal daily activities. He has also found that sometimes physical therapy pushes patients too much causing increased pain and discomfort. At each post-operative appointment, Dr. Chaudhary will teach you exercises to do own to work on range of motion. Although you should progress with some gentle range of motion, as demonstrated by Dr. Chaudhary, do not force any shoulder motions. Most importantly, do not let anyone (family members, physical therapists, etc) force your arm into uncomfortable positions.      DRIVING: You are NOT permitted to drive a car while taking narcotic medication or when you are wearing a sling. Do not drive until after your first follow-up visit with your surgeon.     RETURNING TO WORK: Returning to work depends on the demands of your job and should be discussed with Dr. Chaudhary before the surgery.     FOLLOW UP: Dr. Chaudhary or NADIRA Chappell will need to see you for multiple appointments after your surgery. Typically, Dr. Chaudhary or Misti will see you back after 2 weeks, 6 weeks, 3 months and 6 months. You will be seen at 1 year, 2 years, 5 years and 10 year intervals thereafter.     **Please refer to patient post op info packet given to you at office visit for furter details.

## 2021-03-10 NOTE — ASU PATIENT PROFILE, ADULT - PSH
S/P inguinal hernia repair  right  S/P lumbar fusion  L4-L5  S/P umbilical hernia repair, follow-up exam

## 2021-03-10 NOTE — ASU PATIENT PROFILE, ADULT - URINARY CATHETER
no Spironolactone Counseling: Patient advised regarding risks of diarrhea, abdominal pain, hyperkalemia, birth defects (for female patients), liver toxicity and renal toxicity. The patient may need blood work to monitor liver and kidney function and potassium levels while on therapy. The patient verbalized understanding of the proper use and possible adverse effects of spironolactone.  All of the patient's questions and concerns were addressed.

## 2021-03-10 NOTE — ASU DISCHARGE PLAN (ADULT/PEDIATRIC) - CARE PROVIDER_API CALL
Nilton Chaudhary (DO)  [address], [address]  [place] [address]  Phone: [phone]  Fax: [phone]  Follow Up Time:

## 2021-03-11 ENCOUNTER — TRANSCRIPTION ENCOUNTER (OUTPATIENT)
Age: 61
End: 2021-03-11

## 2021-03-11 VITALS
HEART RATE: 76 BPM | TEMPERATURE: 98 F | OXYGEN SATURATION: 99 % | SYSTOLIC BLOOD PRESSURE: 124 MMHG | DIASTOLIC BLOOD PRESSURE: 67 MMHG | RESPIRATION RATE: 16 BRPM

## 2021-03-11 LAB
ANION GAP SERPL CALC-SCNC: 4 MMOL/L — LOW (ref 5–17)
BUN SERPL-MCNC: 14 MG/DL — SIGNIFICANT CHANGE UP (ref 7–23)
CALCIUM SERPL-MCNC: 8.4 MG/DL — LOW (ref 8.5–10.1)
CHLORIDE SERPL-SCNC: 107 MMOL/L — SIGNIFICANT CHANGE UP (ref 96–108)
CO2 SERPL-SCNC: 30 MMOL/L — SIGNIFICANT CHANGE UP (ref 22–31)
CREAT SERPL-MCNC: 1.04 MG/DL — SIGNIFICANT CHANGE UP (ref 0.5–1.3)
GLUCOSE SERPL-MCNC: 119 MG/DL — HIGH (ref 70–99)
HCT VFR BLD CALC: 38.1 % — LOW (ref 39–50)
HGB BLD-MCNC: 12.3 G/DL — LOW (ref 13–17)
MCHC RBC-ENTMCNC: 31 PG — SIGNIFICANT CHANGE UP (ref 27–34)
MCHC RBC-ENTMCNC: 32.3 GM/DL — SIGNIFICANT CHANGE UP (ref 32–36)
MCV RBC AUTO: 96 FL — SIGNIFICANT CHANGE UP (ref 80–100)
PLATELET # BLD AUTO: 212 K/UL — SIGNIFICANT CHANGE UP (ref 150–400)
POTASSIUM SERPL-MCNC: 4.8 MMOL/L — SIGNIFICANT CHANGE UP (ref 3.5–5.3)
POTASSIUM SERPL-SCNC: 4.8 MMOL/L — SIGNIFICANT CHANGE UP (ref 3.5–5.3)
RBC # BLD: 3.97 M/UL — LOW (ref 4.2–5.8)
RBC # FLD: 12.1 % — SIGNIFICANT CHANGE UP (ref 10.3–14.5)
SODIUM SERPL-SCNC: 141 MMOL/L — SIGNIFICANT CHANGE UP (ref 135–145)
WBC # BLD: 11.52 K/UL — HIGH (ref 3.8–10.5)
WBC # FLD AUTO: 11.52 K/UL — HIGH (ref 3.8–10.5)

## 2021-03-11 RX ORDER — CEFAZOLIN SODIUM 1 G
2000 VIAL (EA) INJECTION ONCE
Refills: 0 | Status: COMPLETED | OUTPATIENT
Start: 2021-03-11 | End: 2021-03-11

## 2021-03-11 RX ORDER — CEFAZOLIN SODIUM 1 G
2000 VIAL (EA) INJECTION EVERY 8 HOURS
Refills: 0 | Status: DISCONTINUED | OUTPATIENT
Start: 2021-03-11 | End: 2021-03-11

## 2021-03-11 RX ORDER — HYDROMORPHONE HYDROCHLORIDE 2 MG/ML
0.5 INJECTION INTRAMUSCULAR; INTRAVENOUS; SUBCUTANEOUS ONCE
Refills: 0 | Status: DISCONTINUED | OUTPATIENT
Start: 2021-03-11 | End: 2021-03-11

## 2021-03-11 RX ADMIN — Medication 100 MILLIGRAM(S): at 09:12

## 2021-03-11 RX ADMIN — HYDROMORPHONE HYDROCHLORIDE 0.5 MILLIGRAM(S): 2 INJECTION INTRAMUSCULAR; INTRAVENOUS; SUBCUTANEOUS at 06:54

## 2021-03-11 RX ADMIN — Medication 500 MILLIGRAM(S): at 09:13

## 2021-03-11 RX ADMIN — Medication 1 TABLET(S): at 09:14

## 2021-03-11 RX ADMIN — Medication 325 MILLIGRAM(S): at 09:13

## 2021-03-11 RX ADMIN — Medication 650 MILLIGRAM(S): at 10:00

## 2021-03-11 RX ADMIN — OXYCODONE HYDROCHLORIDE 5 MILLIGRAM(S): 5 TABLET ORAL at 10:00

## 2021-03-11 RX ADMIN — PANTOPRAZOLE SODIUM 40 MILLIGRAM(S): 20 TABLET, DELAYED RELEASE ORAL at 09:14

## 2021-03-11 RX ADMIN — Medication 1 MILLIGRAM(S): at 09:14

## 2021-03-11 RX ADMIN — Medication 650 MILLIGRAM(S): at 09:13

## 2021-03-11 RX ADMIN — OXYCODONE HYDROCHLORIDE 5 MILLIGRAM(S): 5 TABLET ORAL at 09:13

## 2021-03-11 NOTE — CONSULT NOTE ADULT - SUBJECTIVE AND OBJECTIVE BOX
PCP: Dr. 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  Pt states he had no relief from conservative management and the pain is now impacting his ADLs and has opted for surgical management.  Now s/p right TSR. Pt is seen on 2 north in [facility], denies any CP or SOB.  We are consulted for medical management    PAST MEDICAL HISTORY: as above    PAST SURGICAL HISTORY: cardia cath, umbilical hernia repair, inguinal hernia repair, lumbar fusion, right TKR    FAMILY HISTORY:  Mother Alive and well h/o HLD    SOCIAL HISTORY:  no smoking, + ETOH 2-3 beers per week, no drugs    ALLERGIES: NKDA    HOME MEDS: see med rec    REVIEW OF SYSTEMS:   All 10 systems reviewed in detailed and found to be negative with the exception of what has already been described above    MEDICATIONS  (STANDING):  ALBUTerol  90 MICROgram(s) HFA Inhaler - Peds 2 Puff(s) Inhalation every 4 hours  ascorbic acid 500 milliGRAM(s) Oral two times a day  aspirin 325 milliGRAM(s) Oral daily  atorvastatin 20 milliGRAM(s) Oral at bedtime  budesonide  80 MICROgram(s)/formoterol 4.5 MICROgram(s) Inhaler - Peds 2 Puff(s) Inhalation two times a day  calcium carbonate 1250 mG  + Vitamin D (OsCal 500 + D) 1 Tablet(s) Oral three times a day  ceFAZolin   IVPB 2000 milliGRAM(s) IV Intermittent once  folic acid 1 milliGRAM(s) Oral daily  lactated ringers. 1000 milliLiter(s) (75 mL/Hr) IV Continuous <Continuous>  multivitamin 1 Tablet(s) Oral daily  pantoprazole    Tablet 40 milliGRAM(s) Oral daily  senna 2 Tablet(s) Oral at bedtime    MEDICATIONS  (PRN):  acetaminophen   Tablet .. 650 milliGRAM(s) Oral every 6 hours PRN Temp greater or equal to 38C (100.4F), Mild Pain (1 - 3)  magnesium hydroxide Suspension 30 milliLiter(s) Oral daily PRN Constipation  ondansetron Injectable 4 milliGRAM(s) IV Push every 6 hours PRN Nausea and/or Vomiting  oxyCODONE    IR 2.5 milliGRAM(s) Oral every 3 hours PRN Moderate Pain (4 - 6)  oxyCODONE    IR 5 milliGRAM(s) Oral every 3 hours PRN Severe Pain (7 - 10)      VITALS:  T(F): 98.1 (03-11-21 @ 05:28), Max: 98.1 (03-11-21 @ 05:28)  HR: 83 (03-11-21 @ 05:28) (66 - 88)  BP: 128/59 (03-11-21 @ 05:28) (104/48 - 147/86)  RR: 17 (03-11-21 @ 05:28) (13 - 23)  SpO2: 96% (03-11-21 @ 05:28) (92% - 100%)  Wt(kg): --    I&O's Summary    10 Mar 2021 07:01  -  11 Mar 2021 07:00  --------------------------------------------------------  IN: 1300 mL / OUT: 1275 mL / NET: 25 mL        CAPILLARY BLOOD GLUCOSE      POCT Blood Glucose.: 137 mg/dL (11 Mar 2021 06:44)  POCT Blood Glucose.: 123 mg/dL (10 Mar 2021 17:41)  POCT Blood Glucose.: 93 mg/dL (10 Mar 2021 10:26)    PHYSICAL EXAM:    GENERAL: Comfortable, no acute distress   HEAD:  Normocephalic, atraumatic  EYES: EOMI, PERRLA  HEENT: Moist mucous membranes  NECK: Supple, No JVD  NERVOUS SYSTEM:  Alert & Oriented X3, Motor Strength 5/5 B/L upper and lower extremities  CHEST/LUNG: Clear to auscultation bilaterally  HEART: Regular rate and rhythm  ABDOMEN: Soft, non tender, Nondistended, Bowel sounds present  GENITOURINARY: Voiding, no palpable bladder  EXTREMITIES:   No clubbing, cyanosis, or edema  MUSCULOSKELETAL- right shoulder dsg c/d/i  SKIN-no rash      LABS:                        12.3   11.52 )-----------( 212      ( 11 Mar 2021 07:36 )             38.1     03-11    141  |  107  |  14  ----------------------------<  119<H>  4.8   |  30  |  1.04    Ca    8.4<L>      11 Mar 2021 07:36            IMPRESSION:  59yo male  with above pmh a/w:  # INC pain in right shoulder  # S/P right TSR    POD#1  pain control  PT eval  Bowel regimen  IVF's  Finish ABXs  regular diet  PPI  VTE prophylaxis  monitor CBC/BMP      # leukocytosis  prob reactive  afebrile  monitor CBC    # Asthma  cont inhalers    #MARY ANN   monitor O2 sat   supplement with O2 prn    # HLD  cont statin    # GERD   PPI    # Vte prophylaxis  AC consult  VenAtigeoes  INC mobility      Thank you for the consult, will follow         PCP: Dr. 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  Pt states he had no relief from conservative management and the pain is now impacting his ADLs and has opted for surgical management.  Now s/p right TSR. Pt is seen on 2 north in [facility], denies any CP or SOB.  We are consulted for medical management    PAST MEDICAL HISTORY: as above    PAST SURGICAL HISTORY: cardiac cath, umbilical hernia repair, inguinal hernia repair, lumbar fusion, right TKR    FAMILY HISTORY:  Mother Alive,  and well h/o HLD age 82,  father A& W age 84 no medical issues    SOCIAL HISTORY:  no smoking, + ETOH 2-3 beers per week, no drugs    ALLERGIES: NKDA    HOME MEDS: see med rec    REVIEW OF SYSTEMS:   All 10 systems reviewed in detailed and found to be negative with the exception of what has already been described above    MEDICATIONS  (STANDING):  ALBUTerol  90 MICROgram(s) HFA Inhaler - Peds 2 Puff(s) Inhalation every 4 hours  ascorbic acid 500 milliGRAM(s) Oral two times a day  aspirin 325 milliGRAM(s) Oral daily  atorvastatin 20 milliGRAM(s) Oral at bedtime  budesonide  80 MICROgram(s)/formoterol 4.5 MICROgram(s) Inhaler - Peds 2 Puff(s) Inhalation two times a day  calcium carbonate 1250 mG  + Vitamin D (OsCal 500 + D) 1 Tablet(s) Oral three times a day  ceFAZolin   IVPB 2000 milliGRAM(s) IV Intermittent once  folic acid 1 milliGRAM(s) Oral daily  lactated ringers. 1000 milliLiter(s) (75 mL/Hr) IV Continuous <Continuous>  multivitamin 1 Tablet(s) Oral daily  pantoprazole    Tablet 40 milliGRAM(s) Oral daily  senna 2 Tablet(s) Oral at bedtime    MEDICATIONS  (PRN):  acetaminophen   Tablet .. 650 milliGRAM(s) Oral every 6 hours PRN Temp greater or equal to 38C (100.4F), Mild Pain (1 - 3)  magnesium hydroxide Suspension 30 milliLiter(s) Oral daily PRN Constipation  ondansetron Injectable 4 milliGRAM(s) IV Push every 6 hours PRN Nausea and/or Vomiting  oxyCODONE    IR 2.5 milliGRAM(s) Oral every 3 hours PRN Moderate Pain (4 - 6)  oxyCODONE    IR 5 milliGRAM(s) Oral every 3 hours PRN Severe Pain (7 - 10)      VITALS:  T(F): 98.1 (03-11-21 @ 05:28), Max: 98.1 (03-11-21 @ 05:28)  HR: 83 (03-11-21 @ 05:28) (66 - 88)  BP: 128/59 (03-11-21 @ 05:28) (104/48 - 147/86)  RR: 17 (03-11-21 @ 05:28) (13 - 23)  SpO2: 96% (03-11-21 @ 05:28) (92% - 100%)  Wt(kg): --    I&O's Summary    10 Mar 2021 07:01  -  11 Mar 2021 07:00  --------------------------------------------------------  IN: 1300 mL / OUT: 1275 mL / NET: 25 mL        CAPILLARY BLOOD GLUCOSE      POCT Blood Glucose.: 137 mg/dL (11 Mar 2021 06:44)  POCT Blood Glucose.: 123 mg/dL (10 Mar 2021 17:41)  POCT Blood Glucose.: 93 mg/dL (10 Mar 2021 10:26)    PHYSICAL EXAM:    GENERAL: Comfortable, no acute distress   HEAD:  Normocephalic, atraumatic  EYES: EOMI, PERRLA  HEENT: Moist mucous membranes  NECK: Supple, No JVD  NERVOUS SYSTEM:  Alert & Oriented X3, Motor Strength 5/5 B/L upper and lower extremities  CHEST/LUNG: Clear to auscultation bilaterally  HEART: Regular rate and rhythm  ABDOMEN: Soft, non tender, Nondistended, Bowel sounds present  GENITOURINARY: Voiding, no palpable bladder  EXTREMITIES:   No clubbing, cyanosis, or edema  MUSCULOSKELETAL- right shoulder dsg c/d/i  SKIN-no rash      LABS:                        12.3   11.52 )-----------( 212      ( 11 Mar 2021 07:36 )             38.1     03-11    141  |  107  |  14  ----------------------------<  119<H>  4.8   |  30  |  1.04    Ca    8.4<L>      11 Mar 2021 07:36            IMPRESSION:  61yo male  with above pmh a/w:  # INC pain in right shoulder  # S/P right TSR    POD#1  pain control  PT eval  Bowel regimen  IVF's  Finish ABXs  regular diet  PPI  VTE prophylaxis  monitor CBC/BMP      # leukocytosis  prob reactive  afebrile  monitor CBC    # Asthma  cont inhalers    #MARY ANN   monitor O2 sat   supplement with O2 prn    # HLD  cont statin    # GERD   PPI    # Vte prophylaxis  AC consult  VenMedStartr  INC mobility    Dispo: home today  Thank you for the consult, will follow

## 2021-03-11 NOTE — PHYSICAL THERAPY INITIAL EVALUATION ADULT - ACTIVE RANGE OF MOTION EXAMINATION, REHAB EVAL
Except R Shoulder AROM Not tested as per MD Orders/bilateral upper extremity Active ROM was WFL (within functional limits)/bilateral  lower extremity Active ROM was WFL (within functional limits)

## 2021-03-11 NOTE — DISCHARGE NOTE NURSING/CASE MANAGEMENT/SOCIAL WORK - PATIENT PORTAL LINK FT
You can access the FollowMyHealth Patient Portal offered by [facility] by registering at the following website: [email or web address]. By joining [employer]’s FollowMyHealth portal, you will also be able to view your health information using other applications (apps) compatible with our system.

## 2021-03-11 NOTE — PHYSICAL THERAPY INITIAL EVALUATION ADULT - LEVEL OF INDEPENDENCE: GAIT, REHAB EVAL
Pt seen and evaluated, chart reviewed.  As per nursing staff, pt has been compliant with medications since TOO obtained (7/27/20), less verbally abusive and belligerent towards staff, verbally redirectable and less isolative. On evaluation, pt disorganized at times and difficult to understand, cooperative. Pt endorses good mood, sleep and appetite, endorses regular BM refusing need for stool softeners. Pt denies AVH, however is observed by staff to talk to self in room at times, pt does not appear in distress. Pt in good behavioral control. Denies SI/HI, intent and plan. Na 131 (7/28) --> Na 128 (7/31) --> Na 135 (8/2). Bed Mobility / Transfer Training / Gait Training / Stair Training 15 min/supervision Pt seen and evaluated, chart reviewed.  As per nursing staff, pt has been compliant with medications since TOO obtained (7/27/20), less verbally abusive and belligerent towards staff, verbally redirectable and less isolative. On evaluation, pt disorganized at times and difficult to understand, cooperative. Pt endorses good mood, sleep and appetite, endorses regular BM refusing need for stool softeners. Pt denies AVH, however is observed by staff to talk to self in room at times, pt does not appear in distress. Pt in good behavioral control. Denies SI/HI, intent and plan. Na 131 (7/28) --> Na 128 (7/31) --> Na 135 (8/2). Pt noted to have slight increased WOB, observed by staff with increased coughing during eating; medicine consulted, rec to continue to monitor, SpO2 to be added to VS. Pt seen and evaluated, chart reviewed.  As per nursing staff, pt has been compliant with medications since TOO obtained (7/27/20), less verbally abusive and belligerent towards staff, verbally redirectable and less isolative. On evaluation, pt disorganized at times and difficult to understand, cooperative. Pt endorses good mood, sleep and appetite, endorses regular BM refusing need for stool softeners. Pt denies AVH, however is observed by staff to talk to self in room at times, pt does not appear in distress. Pt in good behavioral control. Denies SI/HI, intent and plan. Na 131 (7/28) --> Na 128 (7/31) --> Na 135 (8/2). Pt noted to have slight increased WOB, observed by staff with increased coughing during eating; medicine consulted, rec to continue to monitor, SpO2 to be added to VS; SLP re-consult pending.

## 2021-03-11 NOTE — PHYSICAL THERAPY INITIAL EVALUATION ADULT - MODALITIES TREATMENT COMMENTS
Pt left OOB in chair with R Shoulder Sling and B SCD's Intact; CBIR; Pt instructed to not get up alone; MICHEAL Jones made aware

## 2021-03-11 NOTE — CONSULT NOTE ADULT - ATTENDING COMMENTS
Patient is seen and examined at bedside with NP Arianne Bojorquez. S/p RT TSR, doing OK today. Wants to go home. Agree with above assessment and plan. D/w pt and ortho team

## 2021-03-11 NOTE — PROGRESS NOTE ADULT - SUBJECTIVE AND OBJECTIVE BOX
Patient seen and examined at bedside. No acute events over night. No acute complaints at this time. Pain well controlled. Denies chest pain, shortness of breath, nausea or vomiting.     PE:  Vital Signs Last 24 Hrs  T(C): 36.7 (03-11-21 @ 05:28), Max: 36.7 (03-10-21 @ 22:03)  T(F): 98.1 (03-11-21 @ 05:28), Max: 98.1 (03-11-21 @ 05:28)  HR: 83 (03-11-21 @ 05:28) (66 - 88)  BP: 128/59 (03-11-21 @ 05:28) (104/48 - 147/86)  BP(mean): --  RR: 17 (03-11-21 @ 05:28) (13 - 23)  SpO2: 96% (03-11-21 @ 05:28) (92% - 100%)    General: NAD, resting comfortably in bed  R UE:   Dressing C/D/I  Compartments soft and compressible  No calf tenderness bilaterally  +Axillary/Musculocutaneous/Radial/Median/AIN/PIN intact  Axillary distribution numbness  SILT C5-T1  2+ radial pulses                          13.3   9.16  )-----------( 180      ( 10 Mar 2021 17:51 )             39.9     10 Mar 2021 17:51    137    |  104    |  12     ----------------------------<  129    4.5     |  29     |  0.98     Ca    8.6        10 Mar 2021 17:51          A/P:  60y m s/p R TSA  -PT/OT - NWB RUE  -Pain Control  -DVT ppx w/ASA  -Axillary numbness will observe   -Continue perioperative abx x 24 hours  -FU AM Labs  -Rest, ice, compress and elevate the extremity as we needed  -Incentive Spirometry  -Medical management appreciated  -Dispo: Home today

## 2021-03-12 PROBLEM — Z87.19 PERSONAL HISTORY OF OTHER DISEASES OF THE DIGESTIVE SYSTEM: Chronic | Status: ACTIVE | Noted: 2021-03-03

## 2021-03-12 PROBLEM — Z98.890 OTHER SPECIFIED POSTPROCEDURAL STATES: Chronic | Status: ACTIVE | Noted: 2021-03-03

## 2021-03-12 PROBLEM — K21.9 GASTRO-ESOPHAGEAL REFLUX DISEASE WITHOUT ESOPHAGITIS: Chronic | Status: ACTIVE | Noted: 2021-03-03

## 2021-03-12 PROBLEM — M19.90 UNSPECIFIED OSTEOARTHRITIS, UNSPECIFIED SITE: Chronic | Status: ACTIVE | Noted: 2021-03-03

## 2021-03-15 RX ORDER — MELOXICAM 7.5 MG/1
7.5 TABLET ORAL DAILY
Qty: 21 | Refills: 1 | Status: DISCONTINUED | COMMUNITY
Start: 2020-05-13 | End: 2021-03-15

## 2021-03-16 DIAGNOSIS — Z98.1 ARTHRODESIS STATUS: ICD-10-CM

## 2021-03-16 DIAGNOSIS — G47.33 OBSTRUCTIVE SLEEP APNEA (ADULT) (PEDIATRIC): ICD-10-CM

## 2021-03-16 DIAGNOSIS — Z85.828 PERSONAL HISTORY OF OTHER MALIGNANT NEOPLASM OF SKIN: ICD-10-CM

## 2021-03-16 DIAGNOSIS — K21.9 GASTRO-ESOPHAGEAL REFLUX DISEASE WITHOUT ESOPHAGITIS: ICD-10-CM

## 2021-03-16 DIAGNOSIS — M19.011 PRIMARY OSTEOARTHRITIS, RIGHT SHOULDER: ICD-10-CM

## 2021-03-16 DIAGNOSIS — J45.909 UNSPECIFIED ASTHMA, UNCOMPLICATED: ICD-10-CM

## 2021-03-16 DIAGNOSIS — Z96.651 PRESENCE OF RIGHT ARTIFICIAL KNEE JOINT: ICD-10-CM

## 2021-03-16 DIAGNOSIS — E78.2 MIXED HYPERLIPIDEMIA: ICD-10-CM

## 2021-03-16 DIAGNOSIS — Z99.89 DEPENDENCE ON OTHER ENABLING MACHINES AND DEVICES: ICD-10-CM

## 2021-03-16 DIAGNOSIS — E66.9 OBESITY, UNSPECIFIED: ICD-10-CM

## 2021-03-23 ENCOUNTER — APPOINTMENT (OUTPATIENT)
Dept: ORTHOPEDIC SURGERY | Facility: CLINIC | Age: 61
End: 2021-03-23
Payer: MEDICARE

## 2021-03-23 PROCEDURE — 73030 X-RAY EXAM OF SHOULDER: CPT | Mod: RT

## 2021-03-23 PROCEDURE — 99024 POSTOP FOLLOW-UP VISIT: CPT

## 2021-03-24 NOTE — HISTORY OF PRESENT ILLNESS
[___ Days Post Op] : post op day #[unfilled] [Clean/Dry/Intact] : clean, dry and intact [Neuro Intact] : an unremarkable neurological exam [Vascular Intact] : ~T peripheral vascular exam normal [Xray (Date:___)] : [unfilled] Xray -  [Chills] : no chills [Fever] : no fever [Nausea] : no nausea [Vomiting] : no vomiting [de-identified] : spo R TSR reverse. DOS: 03/10/2021 [de-identified] : IVAN BULL is a 60 year male being seen for 1st p/o R TSR reverse, 13 days ago. He reports he is experiencing sharp, throbbing pain from his R elbow to his R hand/fingers. This pain comes on during the nighttime. He reports manageable/no pain during the day. He reports using oxycodone and tylenol for pain control.  [de-identified] : Right Shoulder\par \par Incisions are clean, dry and intact. No surrounding erythema. No drainage. Wound appears to be healing well. Unable to test ROM due to nature of surgery. Motor and sensation are intact distally. He has full range of motion of all fingers with capillary refill of <2 seconds throughout. He has good nerve function and median nerve, ulnar, radial, PIN, AIN. He has no sensory deficits over the C5-T1 nerve roots. Radial pulses 2+. Able to make an A-OK sign and thumbs up sign: able to flex/extend thumb, able to cross middle over index finger. \par \par Full ROM elbow, wrist, hand\par  [de-identified] : 2 views of R shoulder were performed today and available for me to review. Results were discussed with the patient. They demonstrate proper alignment of hardware, no f/x, dislocation or other deformity.\par  [de-identified] : IVAN is a 60 year male who is doing well s/p right TSR on 3/10/2021. He presents today in his sling with pillow. Surgical sites are clean and dry without warmth or erythema, pt denies fever or chills.  He is to remain in the sling until we see him again at his next post-op shana and continue with being fully nonweightbearing to this extremity. He will start PT in 2 weeks from today 2-3x/week alongside HEP until we see them again in 4 weeks for clinical reassessment. He may use tylenol prn for pain. He agrees to the latter plan and does not have any further questions or concerns.

## 2021-04-07 ENCOUNTER — TRANSCRIPTION ENCOUNTER (OUTPATIENT)
Age: 61
End: 2021-04-07

## 2021-04-10 ENCOUNTER — RX RENEWAL (OUTPATIENT)
Age: 61
End: 2021-04-10

## 2021-04-20 ENCOUNTER — APPOINTMENT (OUTPATIENT)
Dept: ORTHOPEDIC SURGERY | Facility: CLINIC | Age: 61
End: 2021-04-20
Payer: MEDICARE

## 2021-04-20 DIAGNOSIS — M19.011 PRIMARY OSTEOARTHRITIS, RIGHT SHOULDER: ICD-10-CM

## 2021-04-20 DIAGNOSIS — Z98.890 OTHER SPECIFIED POSTPROCEDURAL STATES: ICD-10-CM

## 2021-04-20 DIAGNOSIS — M12.811 OTHER SPECIFIC ARTHROPATHIES, NOT ELSEWHERE CLASSIFIED, RIGHT SHOULDER: ICD-10-CM

## 2021-04-20 PROCEDURE — 73030 X-RAY EXAM OF SHOULDER: CPT | Mod: RT

## 2021-04-20 PROCEDURE — 99024 POSTOP FOLLOW-UP VISIT: CPT

## 2021-04-26 ENCOUNTER — RX RENEWAL (OUTPATIENT)
Age: 61
End: 2021-04-26

## 2021-05-11 NOTE — HISTORY OF PRESENT ILLNESS
[___ Days Post Op] : post op day #[unfilled] [Clean/Dry/Intact] : clean, dry and intact [Neuro Intact] : an unremarkable neurological exam [Vascular Intact] : ~T peripheral vascular exam normal [Xray (Date:___)] : [unfilled] Xray -  [Chills] : no chills [Fever] : no fever [Nausea] : no nausea [Vomiting] : no vomiting [de-identified] : spo R TSR reverse. DOS: 03/10/2021 [de-identified] : IVAN BULL is a 60 year male being seen for 1st p/o R TSR reverse, 6 weeks ago.  He reports manageable/no pain during the day. He reports no pain medications required at this time. He presents today in his sling. He denies numbness/tingling down the arm and no signs of infection. He has not started PT yet. [de-identified] : Right Shoulder\par \par Incisions are clean, dry and intact. No surrounding erythema. No drainage. Wound appears to be healing well. Passive FF 0-70, ER to 10, IR not tested. Motor and sensation are intact distally. He has full range of motion of all fingers with capillary refill of <2 seconds throughout. He has good nerve function and median nerve, ulnar, radial, PIN, AIN. He has no sensory deficits over the C5-T1 nerve roots. Radial pulses 2+. Able to make an A-OK sign and thumbs up sign: able to flex/extend thumb, able to cross middle over index finger. \par \par Full ROM elbow, wrist, hand\par  [de-identified] : 2 views of R shoulder were performed today and available for me to review. Results were discussed with the patient. They demonstrate proper alignment of hardware, no f/x, dislocation or other deformity.\par  [de-identified] : IVAN BULL is a 60 year male being seen for 1st p/o R TSR reverse, 6 weeks ago.  He reports manageable/no pain during the day. He reports no pain medications required at this time. He presents today in his sling. He denies numbness/tingling down the arm and no signs of infection. He has not started PT yet.\par \par Surgical sites are clean and dry without warmth or erythema, pt denies fever or chills.   He will start PT for gentle ROM with one of the thearpist we recommended to him 2x/week alongside HEP until we see them again in 6 weeks for clinical reassessment. He may use tylenol prn for pain. He agrees to the latter plan and does not have any further questions or concerns. Rhombic Flap Text: The defect edges were debeveled with a #15 scalpel blade.  Given the location of the defect and the proximity to free margins a rhombic flap was deemed most appropriate.  Using a sterile surgical marker, an appropriate rhombic flap was drawn incorporating the defect.    The area thus outlined was incised deep to adipose tissue with a #15 scalpel blade.  The skin margins were undermined to an appropriate distance in all directions utilizing iris scissors.

## 2023-08-27 ENCOUNTER — EMERGENCY (EMERGENCY)
Facility: HOSPITAL | Age: 63
LOS: 1 days | Discharge: HOME | End: 2023-08-27
Attending: EMERGENCY MEDICINE | Admitting: EMERGENCY MEDICINE
Payer: MEDICARE

## 2023-08-27 VITALS
RESPIRATION RATE: 18 BRPM | HEART RATE: 70 BPM | DIASTOLIC BLOOD PRESSURE: 85 MMHG | TEMPERATURE: 98 F | SYSTOLIC BLOOD PRESSURE: 135 MMHG | OXYGEN SATURATION: 99 %

## 2023-08-27 VITALS
TEMPERATURE: 98 F | OXYGEN SATURATION: 99 % | HEIGHT: 67 IN | DIASTOLIC BLOOD PRESSURE: 93 MMHG | WEIGHT: 229.94 LBS | HEART RATE: 76 BPM | RESPIRATION RATE: 16 BRPM | SYSTOLIC BLOOD PRESSURE: 149 MMHG

## 2023-08-27 DIAGNOSIS — Z98.890 OTHER SPECIFIED POSTPROCEDURAL STATES: Chronic | ICD-10-CM

## 2023-08-27 DIAGNOSIS — Z98.89 OTHER SPECIFIED POSTPROCEDURAL STATES: Chronic | ICD-10-CM

## 2023-08-27 DIAGNOSIS — Z96.651 PRESENCE OF RIGHT ARTIFICIAL KNEE JOINT: Chronic | ICD-10-CM

## 2023-08-27 DIAGNOSIS — Z98.1 ARTHRODESIS STATUS: Chronic | ICD-10-CM

## 2023-08-27 PROCEDURE — 12004 RPR S/N/AX/GEN/TRK7.6-12.5CM: CPT

## 2023-08-27 PROCEDURE — 99284 EMERGENCY DEPT VISIT MOD MDM: CPT | Mod: 25

## 2023-08-27 PROCEDURE — 70450 CT HEAD/BRAIN W/O DYE: CPT | Mod: 26,MA

## 2023-08-27 PROCEDURE — 72125 CT NECK SPINE W/O DYE: CPT | Mod: MA

## 2023-08-27 PROCEDURE — 70450 CT HEAD/BRAIN W/O DYE: CPT | Mod: MA

## 2023-08-27 PROCEDURE — 72125 CT NECK SPINE W/O DYE: CPT | Mod: 26,MA

## 2023-08-27 RX ORDER — IBUPROFEN 200 MG
600 TABLET ORAL ONCE
Refills: 0 | Status: COMPLETED | OUTPATIENT
Start: 2023-08-27 | End: 2023-08-27

## 2023-08-27 RX ORDER — OXYCODONE AND ACETAMINOPHEN 5; 325 MG/1; MG/1
1 TABLET ORAL ONCE
Refills: 0 | Status: DISCONTINUED | OUTPATIENT
Start: 2023-08-27 | End: 2023-08-27

## 2023-08-27 RX ADMIN — Medication 600 MILLIGRAM(S): at 22:25

## 2023-08-27 RX ADMIN — OXYCODONE AND ACETAMINOPHEN 1 TABLET(S): 5; 325 TABLET ORAL at 22:25

## 2023-08-27 NOTE — ED PROVIDER NOTE - PATIENT PORTAL LINK FT
You can access the FollowMyHealth Patient Portal offered by [facility] by registering at the following website: [email or web address]. By joining [employer]’s FollowMyHealth portal, you will also be able to view your health information using other applications (apps) compatible with our system.

## 2023-08-27 NOTE — ED ADULT NURSE NOTE - NSSEPSISSUSPECTED_ED_A_ED
Patient advised of the urine results. He voiced understanding to take the bactrim until completed. Spoke to Yanely at [place]. She voiced understanding to cancel the Bactrim sent this am. It was a duplicate prescription. No
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
## 2023-08-27 NOTE — ED PROVIDER NOTE - OBJECTIVE STATEMENT
62-year-old male.  Significant past medical history for coronary artery disease, hyperlipidemia, GERD presents to the ED status post mechanical fall sustaining scalp laceration and head trauma.  Patient states that he fell backwards from a plastic lawn chair and hit the back of his head on a wooden dock.  Patient denies LOC or blood thinners.
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
## 2023-08-27 NOTE — ED ADULT NURSE NOTE - NS TRANSFER PATIENT BELONGINGS
Spoke with pt today in f/u and he states that he is still having some pain, however, when asked if he got his script for the Skelaxin filled he states he has not yet filled the need for any medication. Pt also stated that he has not called for a f/u with his PCP as he is going to give it another 4 to 5 days for symptoms to resolve. Encouraged pt to come back to er if symptoms worsen. No other questions or concerns voiced by pt at this time. Fidelina LANGSTON   Cell Phone/PDA (specify)/Jewelry/Money (specify)/Clothing

## 2023-08-27 NOTE — ED PROVIDER NOTE - NSFOLLOWUPINSTRUCTIONS_ED_ALL_ED_FT
Return in 10 days for staple removal    Head Injury    WHAT YOU NEED TO KNOW:    What do I need to know about a head injury? A head injury can include your scalp, face, skull, or brain and range from mild to severe. Effects can appear immediately after the injury or develop later. The effects may last a short time or be permanent. Healthcare providers may want to check your recovery over time. Treatment may change as you recover or develop new health problems from the head injury.    What are the signs and symptoms of a head injury?    An open scalp or skin wound, swelling, or bruising    Mild to moderate headache    Dizziness or loss of balance    Nausea or vomiting    Ringing in the ears or neck pain    Confusion, especially right after the injury    Change in mood, such as feeling restless or irritable    Trouble thinking, remembering, or concentrating    Drowsiness or decreased amount of energy    Trouble sleeping  How is a head injury diagnosed?    Tell your healthcare provider about your injury and symptoms. The provider will do an exam to check your brain function. He or she will check how your pupils react to light. He or she will check your memory, hand grasp, and balance.    You may need x-rays, a CT scan, or an MRI to check for bleeding or major damage to your skull or brain. You may be given contrast liquid to help the pictures show up better. Tell the healthcare provider if you have ever had an allergic reaction to contrast liquid. Do not enter the MRI room with anything metal. Metal can cause serious injury. Tell the provider if you have any metal in or on your body.  How is a head injury treated? A mild head injury may not need to be treated. You may be given medicine to decrease pain. Other treatments may depend on how severe your head injury is. A concussion, hematoma (collection of blood), or traumatic brain injury may need both immediate and long-term treatment.    How can I manage my symptoms?    Rest or do quiet activities. Limit your time watching TV, using the computer, or doing tasks that require a lot of thinking. Slowly return to your normal activities as directed. Do not play sports or do activities that may cause you to get hit in the head. Ask your healthcare provider when you can return to sports.    Apply ice on your head for 15 to 20 minutes every hour or as directed. Use an ice pack, or put crushed ice in a plastic bag. Cover it with a towel before you apply it to your skin. Ice helps prevent tissue damage and decreases swelling and pain.    Have someone stay with you for 24 hours , or as directed. This person can monitor you for problems and call for help if needed. When you are awake, the person should ask you a few questions every few hours to see if you are thinking clearly. An example is to ask your name or address.  What can I do to prevent another head injury?    Wear a helmet that fits properly. Do this when you play sports, or ride a bike, scooter, or skateboard. Helmets help decrease your risk for a serious head injury. Talk to your healthcare provider about other ways you can protect yourself if you play sports.    Wear your seatbelt every time you are in a car. This helps lower your risk for a head injury if you are in a car accident.  Call your local emergency number (911 in the ), or have someone else call if:    You cannot be woken.    You have a seizure.    You stop responding to others or you faint.    You have blurry or double vision.    Your speech becomes slurred or confused.    You have arm or leg weakness, loss of feeling, or new problems with coordination.    Your pupils are larger than usual, or one pupil is a different size than the other.    You have blood or clear fluid coming out of your ears or nose.  When should I seek immediate care?    You have repeated or forceful vomiting.    You feel confused.    Your headache gets worse or becomes severe.    You or someone caring for you notices that you are harder to wake than usual.  When should I call my doctor?    Your symptoms last longer than 6 weeks after the injury.    You have questions or concerns about your condition or care.

## 2023-08-27 NOTE — ED ADULT NURSE NOTE - TEMPLATE
SUBJECTIVE:  Patient is a 8 year old  female  who presents with clear  rhinnorhea, nasal congestion  and congestive  Cough and sore throat .  Onset 2 days ago and the symptoms have been persistent and unchanged.  She  has no history of: significant recurrent respiratory illnesses.    She is with parent today.  otherwise Child is eating well and urinating/wetting diapers properly; playing well.  No  fever  denies any respiratory distress;  denies any gastrointestinal symptoms;  denies any genitourinary symptoms;  denies any  neurologic symptoms;    Past Medical History:   Diagnosis Date   • colic 7/2/9   • Febrile seizure (CMS/Formerly [facility])    • Health supervision of other healthy infant or child receiving care    • Intussusception (CMS/Formerly [facility])     x2, fall 2011   • Umbilical hernia without mention of obstruction or gangrene        Social history:  Smoking: nonsmoker: home: none                           Alcohol: none                          Drugs: none                   Past Surgical History:   Procedure Laterality Date   • Past surgical history      None   • Repair umbilical valerie,<6y/o,reduc      Hernia, Umbilical <5 Yrs         PHYSICAL EXAM:    APPEARANCE: alert, in no acute  distress; pulse oximetry 99% ra;  HEAD: Normocephalic. No masses, lesions, tenderness or abnormalities  EYES: Pupils equal, round, reactive to light and accommodation, normal extraocular movements   EARS: Boggy bilateral tympanic membrane(s).  NOSE: Edematous nasal mucosa, erythematous nasal mucosa, occluded with clear  nasal discharge.   THROAT: clear  postnasal drip and mild redness;  NECK: Neck supple. No masses. No adenopathy.    negative meningeal signs.  LUNGS: Few scattered expiratory rhonchi and no  expiratory wheezes.  HEART: Regular rate and rhythm, S1 and S2 normal   EXTREMITIES: There is no edema.    ASSESSMENT:  1. Pharyngitis due to other organism    2. Bronchitis    3. Other acute sinusitis, recurrence not specified             PLAN:  Push fluids.   Normal saline sprays in nostrils and bulb suction in nostrils to clear nasal congestion prn.  Use vaporizer in the room at night time;  Can use otc Vicks VapoRub as needed for symptomatic relief of congestion;  >If symptoms are not improved in 3 days or if they worsen should recheck immediately either with pmd/wic.  >Tylenol or advil  for fever prn.  >Medication as ordered below.  >In case of worsening fever or poor feeding or  decreased urinary output or becoming lethargic, vomiting or increased irritability, asked parent to report to ER immediately or seek medical attention.     Fall

## 2023-08-27 NOTE — ED ADULT NURSE NOTE - NSFALLRISKINTERV_ED_ALL_ED

## 2023-08-27 NOTE — ED ADULT NURSE NOTE - NURSING ED SKIN COLOR
Patient was calm and cooperative in the ED and did not exhibit any aggression. Pt did not require any prn medications or any physical restraints.    Vital Signs Last 24 Hrs  T(C): 36.7 (04 Aug 2023 15:48), Max: 36.7 (04 Aug 2023 15:48)  T(F): 98 (04 Aug 2023 15:48), Max: 98 (04 Aug 2023 15:48)  HR: 62 (04 Aug 2023 15:48) (62 - 62)  BP: 118/70 (04 Aug 2023 15:48) (118/70 - 118/70)  BP(mean): --  RR: --  SpO2: 96% (04 Aug 2023 15:48) (96% - 100%)    Parameters below as of 04 Aug 2023 15:39  Patient On (Oxygen Delivery Method): room air denies lives w family normal for race

## 2023-08-27 NOTE — ED PROVIDER NOTE - NSICDXPASTMEDICALHX_GEN_ALL_CORE_FT
PAST MEDICAL HISTORY:  Arthritis     Asthma WTC responder--followed by [facility] monitoring center    GERD (gastroesophageal reflux disease)     H/O melanoma excision Left thigh    H/O squamous cell carcinoma excision left hand    History of Thomson's esophagus     Hyperlipidemia     Sleep apnea CPAP

## 2023-09-04 ENCOUNTER — NON-APPOINTMENT (OUTPATIENT)
Age: 63
End: 2023-09-04

## 2023-11-28 ENCOUNTER — APPOINTMENT (OUTPATIENT)
Dept: ORTHOPEDIC SURGERY | Facility: CLINIC | Age: 63
End: 2023-11-28
Payer: MEDICARE

## 2023-11-28 PROCEDURE — 99214 OFFICE O/P EST MOD 30 MIN: CPT

## 2023-11-28 PROCEDURE — 73030 X-RAY EXAM OF SHOULDER: CPT | Mod: LT

## 2023-11-29 ENCOUNTER — INPATIENT (INPATIENT)
Facility: HOSPITAL | Age: 63
LOS: 7 days | Discharge: HOME | DRG: 908 | End: 2023-12-07
Attending: SPECIALIST | Admitting: SPECIALIST
Payer: MEDICARE

## 2023-11-29 VITALS
WEIGHT: 240.08 LBS | OXYGEN SATURATION: 98 % | HEIGHT: 68 IN | TEMPERATURE: 97 F | SYSTOLIC BLOOD PRESSURE: 166 MMHG | RESPIRATION RATE: 16 BRPM | DIASTOLIC BLOOD PRESSURE: 84 MMHG | HEART RATE: 88 BPM

## 2023-11-29 DIAGNOSIS — Z98.89 OTHER SPECIFIED POSTPROCEDURAL STATES: Chronic | ICD-10-CM

## 2023-11-29 DIAGNOSIS — Z98.1 ARTHRODESIS STATUS: Chronic | ICD-10-CM

## 2023-11-29 DIAGNOSIS — R06.02 SHORTNESS OF BREATH: ICD-10-CM

## 2023-11-29 DIAGNOSIS — Z96.651 PRESENCE OF RIGHT ARTIFICIAL KNEE JOINT: Chronic | ICD-10-CM

## 2023-11-29 DIAGNOSIS — Z98.890 OTHER SPECIFIED POSTPROCEDURAL STATES: Chronic | ICD-10-CM

## 2023-11-29 DIAGNOSIS — K56.609 UNSPECIFIED INTESTINAL OBSTRUCTION, UNSPECIFIED AS TO PARTIAL VERSUS COMPLETE OBSTRUCTION: ICD-10-CM

## 2023-11-29 LAB
ALBUMIN SERPL ELPH-MCNC: 3.7 G/DL — SIGNIFICANT CHANGE UP (ref 3.3–5)
ALBUMIN SERPL ELPH-MCNC: 3.7 G/DL — SIGNIFICANT CHANGE UP (ref 3.3–5)
ALP SERPL-CCNC: 59 U/L — SIGNIFICANT CHANGE UP (ref 40–120)
ALP SERPL-CCNC: 59 U/L — SIGNIFICANT CHANGE UP (ref 40–120)
ALT FLD-CCNC: 20 U/L — SIGNIFICANT CHANGE UP (ref 12–78)
ALT FLD-CCNC: 20 U/L — SIGNIFICANT CHANGE UP (ref 12–78)
ANION GAP SERPL CALC-SCNC: 9 MMOL/L — SIGNIFICANT CHANGE UP (ref 5–17)
ANION GAP SERPL CALC-SCNC: 9 MMOL/L — SIGNIFICANT CHANGE UP (ref 5–17)
APTT BLD: 31.6 SEC — SIGNIFICANT CHANGE UP (ref 24.5–35.6)
APTT BLD: 31.6 SEC — SIGNIFICANT CHANGE UP (ref 24.5–35.6)
AST SERPL-CCNC: 12 U/L — LOW (ref 15–37)
AST SERPL-CCNC: 12 U/L — LOW (ref 15–37)
BASOPHILS # BLD AUTO: 0.04 K/UL — SIGNIFICANT CHANGE UP (ref 0–0.2)
BASOPHILS # BLD AUTO: 0.04 K/UL — SIGNIFICANT CHANGE UP (ref 0–0.2)
BASOPHILS NFR BLD AUTO: 0.4 % — SIGNIFICANT CHANGE UP (ref 0–2)
BASOPHILS NFR BLD AUTO: 0.4 % — SIGNIFICANT CHANGE UP (ref 0–2)
BILIRUB SERPL-MCNC: 0.9 MG/DL — SIGNIFICANT CHANGE UP (ref 0.2–1.2)
BILIRUB SERPL-MCNC: 0.9 MG/DL — SIGNIFICANT CHANGE UP (ref 0.2–1.2)
BUN SERPL-MCNC: 14 MG/DL — SIGNIFICANT CHANGE UP (ref 7–23)
BUN SERPL-MCNC: 14 MG/DL — SIGNIFICANT CHANGE UP (ref 7–23)
CALCIUM SERPL-MCNC: 9.5 MG/DL — SIGNIFICANT CHANGE UP (ref 8.5–10.1)
CALCIUM SERPL-MCNC: 9.5 MG/DL — SIGNIFICANT CHANGE UP (ref 8.5–10.1)
CHLORIDE SERPL-SCNC: 98 MMOL/L — SIGNIFICANT CHANGE UP (ref 96–108)
CHLORIDE SERPL-SCNC: 98 MMOL/L — SIGNIFICANT CHANGE UP (ref 96–108)
CO2 SERPL-SCNC: 30 MMOL/L — SIGNIFICANT CHANGE UP (ref 22–31)
CO2 SERPL-SCNC: 30 MMOL/L — SIGNIFICANT CHANGE UP (ref 22–31)
CREAT SERPL-MCNC: 0.85 MG/DL — SIGNIFICANT CHANGE UP (ref 0.5–1.3)
CREAT SERPL-MCNC: 0.85 MG/DL — SIGNIFICANT CHANGE UP (ref 0.5–1.3)
EGFR: 96 ML/MIN/1.73M2 — SIGNIFICANT CHANGE UP
EGFR: 96 ML/MIN/1.73M2 — SIGNIFICANT CHANGE UP
EOSINOPHIL # BLD AUTO: 0.01 K/UL — SIGNIFICANT CHANGE UP (ref 0–0.5)
EOSINOPHIL # BLD AUTO: 0.01 K/UL — SIGNIFICANT CHANGE UP (ref 0–0.5)
EOSINOPHIL NFR BLD AUTO: 0.1 % — SIGNIFICANT CHANGE UP (ref 0–6)
EOSINOPHIL NFR BLD AUTO: 0.1 % — SIGNIFICANT CHANGE UP (ref 0–6)
GLUCOSE SERPL-MCNC: 138 MG/DL — HIGH (ref 70–99)
GLUCOSE SERPL-MCNC: 138 MG/DL — HIGH (ref 70–99)
HCT VFR BLD CALC: 47.8 % — SIGNIFICANT CHANGE UP (ref 39–50)
HCT VFR BLD CALC: 47.8 % — SIGNIFICANT CHANGE UP (ref 39–50)
HGB BLD-MCNC: 16 G/DL — SIGNIFICANT CHANGE UP (ref 13–17)
HGB BLD-MCNC: 16 G/DL — SIGNIFICANT CHANGE UP (ref 13–17)
IMM GRANULOCYTES NFR BLD AUTO: 0.5 % — SIGNIFICANT CHANGE UP (ref 0–0.9)
IMM GRANULOCYTES NFR BLD AUTO: 0.5 % — SIGNIFICANT CHANGE UP (ref 0–0.9)
INR BLD: 0.88 RATIO — SIGNIFICANT CHANGE UP (ref 0.85–1.18)
INR BLD: 0.88 RATIO — SIGNIFICANT CHANGE UP (ref 0.85–1.18)
LIDOCAIN IGE QN: 37 U/L — SIGNIFICANT CHANGE UP (ref 13–75)
LIDOCAIN IGE QN: 37 U/L — SIGNIFICANT CHANGE UP (ref 13–75)
LYMPHOCYTES # BLD AUTO: 0.74 K/UL — LOW (ref 1–3.3)
LYMPHOCYTES # BLD AUTO: 0.74 K/UL — LOW (ref 1–3.3)
LYMPHOCYTES # BLD AUTO: 7 % — LOW (ref 13–44)
LYMPHOCYTES # BLD AUTO: 7 % — LOW (ref 13–44)
MCHC RBC-ENTMCNC: 31.6 PG — SIGNIFICANT CHANGE UP (ref 27–34)
MCHC RBC-ENTMCNC: 31.6 PG — SIGNIFICANT CHANGE UP (ref 27–34)
MCHC RBC-ENTMCNC: 33.5 GM/DL — SIGNIFICANT CHANGE UP (ref 32–36)
MCHC RBC-ENTMCNC: 33.5 GM/DL — SIGNIFICANT CHANGE UP (ref 32–36)
MCV RBC AUTO: 94.3 FL — SIGNIFICANT CHANGE UP (ref 80–100)
MCV RBC AUTO: 94.3 FL — SIGNIFICANT CHANGE UP (ref 80–100)
MONOCYTES # BLD AUTO: 0.4 K/UL — SIGNIFICANT CHANGE UP (ref 0–0.9)
MONOCYTES # BLD AUTO: 0.4 K/UL — SIGNIFICANT CHANGE UP (ref 0–0.9)
MONOCYTES NFR BLD AUTO: 3.8 % — SIGNIFICANT CHANGE UP (ref 2–14)
MONOCYTES NFR BLD AUTO: 3.8 % — SIGNIFICANT CHANGE UP (ref 2–14)
NEUTROPHILS # BLD AUTO: 9.27 K/UL — HIGH (ref 1.8–7.4)
NEUTROPHILS # BLD AUTO: 9.27 K/UL — HIGH (ref 1.8–7.4)
NEUTROPHILS NFR BLD AUTO: 88.2 % — HIGH (ref 43–77)
NEUTROPHILS NFR BLD AUTO: 88.2 % — HIGH (ref 43–77)
NRBC # BLD: 0 /100 WBCS — SIGNIFICANT CHANGE UP (ref 0–0)
NRBC # BLD: 0 /100 WBCS — SIGNIFICANT CHANGE UP (ref 0–0)
PLATELET # BLD AUTO: 228 K/UL — SIGNIFICANT CHANGE UP (ref 150–400)
PLATELET # BLD AUTO: 228 K/UL — SIGNIFICANT CHANGE UP (ref 150–400)
POTASSIUM SERPL-MCNC: 4.6 MMOL/L — SIGNIFICANT CHANGE UP (ref 3.5–5.3)
POTASSIUM SERPL-MCNC: 4.6 MMOL/L — SIGNIFICANT CHANGE UP (ref 3.5–5.3)
POTASSIUM SERPL-SCNC: 4.6 MMOL/L — SIGNIFICANT CHANGE UP (ref 3.5–5.3)
POTASSIUM SERPL-SCNC: 4.6 MMOL/L — SIGNIFICANT CHANGE UP (ref 3.5–5.3)
PROT SERPL-MCNC: 7.3 G/DL — SIGNIFICANT CHANGE UP (ref 6–8.3)
PROT SERPL-MCNC: 7.3 G/DL — SIGNIFICANT CHANGE UP (ref 6–8.3)
PROTHROM AB SERPL-ACNC: 10.3 SEC — SIGNIFICANT CHANGE UP (ref 9.5–13)
PROTHROM AB SERPL-ACNC: 10.3 SEC — SIGNIFICANT CHANGE UP (ref 9.5–13)
RBC # BLD: 5.07 M/UL — SIGNIFICANT CHANGE UP (ref 4.2–5.8)
RBC # BLD: 5.07 M/UL — SIGNIFICANT CHANGE UP (ref 4.2–5.8)
RBC # FLD: 12.5 % — SIGNIFICANT CHANGE UP (ref 10.3–14.5)
RBC # FLD: 12.5 % — SIGNIFICANT CHANGE UP (ref 10.3–14.5)
SODIUM SERPL-SCNC: 137 MMOL/L — SIGNIFICANT CHANGE UP (ref 135–145)
SODIUM SERPL-SCNC: 137 MMOL/L — SIGNIFICANT CHANGE UP (ref 135–145)
WBC # BLD: 10.51 K/UL — HIGH (ref 3.8–10.5)
WBC # BLD: 10.51 K/UL — HIGH (ref 3.8–10.5)
WBC # FLD AUTO: 10.51 K/UL — HIGH (ref 3.8–10.5)
WBC # FLD AUTO: 10.51 K/UL — HIGH (ref 3.8–10.5)

## 2023-11-29 PROCEDURE — 74019 RADEX ABDOMEN 2 VIEWS: CPT | Mod: 26

## 2023-11-29 PROCEDURE — 71045 X-RAY EXAM CHEST 1 VIEW: CPT | Mod: 26,77

## 2023-11-29 PROCEDURE — 99223 1ST HOSP IP/OBS HIGH 75: CPT

## 2023-11-29 PROCEDURE — 71045 X-RAY EXAM CHEST 1 VIEW: CPT | Mod: 26

## 2023-11-29 PROCEDURE — 74177 CT ABD & PELVIS W/CONTRAST: CPT | Mod: 26,MA

## 2023-11-29 PROCEDURE — 99285 EMERGENCY DEPT VISIT HI MDM: CPT | Mod: FS

## 2023-11-29 PROCEDURE — 93010 ELECTROCARDIOGRAM REPORT: CPT

## 2023-11-29 RX ORDER — MORPHINE SULFATE 50 MG/1
4 CAPSULE, EXTENDED RELEASE ORAL ONCE
Refills: 0 | Status: DISCONTINUED | OUTPATIENT
Start: 2023-11-29 | End: 2023-11-29

## 2023-11-29 RX ORDER — SODIUM CHLORIDE 9 MG/ML
1000 INJECTION, SOLUTION INTRAVENOUS
Refills: 0 | Status: DISCONTINUED | OUTPATIENT
Start: 2023-11-29 | End: 2023-12-01

## 2023-11-29 RX ORDER — DIATRIZOATE MEGLUMINE 180 MG/ML
100 INJECTION, SOLUTION INTRAVESICAL ONCE
Refills: 0 | Status: COMPLETED | OUTPATIENT
Start: 2023-11-30 | End: 2023-11-30

## 2023-11-29 RX ORDER — SODIUM CHLORIDE 9 MG/ML
1000 INJECTION INTRAMUSCULAR; INTRAVENOUS; SUBCUTANEOUS ONCE
Refills: 0 | Status: COMPLETED | OUTPATIENT
Start: 2023-11-29 | End: 2023-11-29

## 2023-11-29 RX ORDER — DIATRIZOATE MEGLUMINE 180 MG/ML
100 INJECTION, SOLUTION INTRAVESICAL ONCE
Refills: 0 | Status: DISCONTINUED | OUTPATIENT
Start: 2023-11-29 | End: 2023-11-29

## 2023-11-29 RX ORDER — IOHEXOL 300 MG/ML
30 INJECTION, SOLUTION INTRAVENOUS ONCE
Refills: 0 | Status: COMPLETED | OUTPATIENT
Start: 2023-11-29 | End: 2023-11-29

## 2023-11-29 RX ORDER — ONDANSETRON 8 MG/1
4 TABLET, FILM COATED ORAL ONCE
Refills: 0 | Status: COMPLETED | OUTPATIENT
Start: 2023-11-29 | End: 2023-11-29

## 2023-11-29 RX ORDER — ALBUTEROL 90 UG/1
1 AEROSOL, METERED ORAL EVERY 6 HOURS
Refills: 0 | Status: DISCONTINUED | OUTPATIENT
Start: 2023-11-29 | End: 2023-12-04

## 2023-11-29 RX ORDER — ACETAMINOPHEN 500 MG
1000 TABLET ORAL ONCE
Refills: 0 | Status: COMPLETED | OUTPATIENT
Start: 2023-11-29 | End: 2023-11-30

## 2023-11-29 RX ORDER — KETOROLAC TROMETHAMINE 30 MG/ML
30 SYRINGE (ML) INJECTION ONCE
Refills: 0 | Status: DISCONTINUED | OUTPATIENT
Start: 2023-11-29 | End: 2023-11-30

## 2023-11-29 RX ORDER — PANTOPRAZOLE SODIUM 20 MG/1
40 TABLET, DELAYED RELEASE ORAL DAILY
Refills: 0 | Status: DISCONTINUED | OUTPATIENT
Start: 2023-11-29 | End: 2023-12-04

## 2023-11-29 RX ORDER — BENZOCAINE 10 %
1 GEL (GRAM) MUCOUS MEMBRANE ONCE
Refills: 0 | Status: COMPLETED | OUTPATIENT
Start: 2023-11-29 | End: 2023-11-29

## 2023-11-29 RX ADMIN — ONDANSETRON 4 MILLIGRAM(S): 8 TABLET, FILM COATED ORAL at 13:16

## 2023-11-29 RX ADMIN — MORPHINE SULFATE 4 MILLIGRAM(S): 50 CAPSULE, EXTENDED RELEASE ORAL at 18:07

## 2023-11-29 RX ADMIN — IOHEXOL 30 MILLILITER(S): 300 INJECTION, SOLUTION INTRAVENOUS at 13:40

## 2023-11-29 RX ADMIN — MORPHINE SULFATE 4 MILLIGRAM(S): 50 CAPSULE, EXTENDED RELEASE ORAL at 13:17

## 2023-11-29 RX ADMIN — MORPHINE SULFATE 4 MILLIGRAM(S): 50 CAPSULE, EXTENDED RELEASE ORAL at 14:17

## 2023-11-29 RX ADMIN — Medication 1 SPRAY(S): at 18:35

## 2023-11-29 RX ADMIN — SODIUM CHLORIDE 1000 MILLILITER(S): 9 INJECTION INTRAMUSCULAR; INTRAVENOUS; SUBCUTANEOUS at 13:17

## 2023-11-29 RX ADMIN — MORPHINE SULFATE 4 MILLIGRAM(S): 50 CAPSULE, EXTENDED RELEASE ORAL at 17:07

## 2023-11-29 NOTE — H&P ADULT - NSHPPHYSICALEXAM_GEN_ALL_CORE
Vital Signs Last 24 Hrs  T(C): 36.9 (29 Nov 2023 17:08), Max: 36.9 (29 Nov 2023 17:08)  T(F): 98.5 (29 Nov 2023 17:08), Max: 98.5 (29 Nov 2023 17:08)  HR: 75 (29 Nov 2023 17:08) (75 - 88)  BP: 146/96 (29 Nov 2023 17:08) (146/96 - 166/84)  RR: 16 (29 Nov 2023 17:08) (16 - 16)  SpO2: 93% (29 Nov 2023 17:08) (93% - 98%)    Parameters below as of 29 Nov 2023 17:08  Patient On (Oxygen Delivery Method): room air    PHYSICAL EXAM:  CONSTITUTIONAL: No apparent distress, lying comfortably in bed  HEAD:  Atraumatic, normocephalic  EYES: EOMI, PERRLA, conjunctiva and sclera clear  ENMT: Oral mucosa with moist membranes. Normal dentition; no pharyngeal injection or exudates  NECK: Supple, symmetric and without tracheal deviation   RESP: No respiratory distress, no use of accessory muscles  CV: RRR, no peripheral edema  GI: soft distended, tympanic, non-tender, midline abdominal incision slight firmness supraumbilically  EXTREMITIES: 2+ peripheral pulses, no clubbing, cyanosis, or edema  PSYCH: A&O x3  NEUROLOGY: Non-focal, motor & sensory grossly intact  SKIN: Warm & dry, no rashes or lesions; no subcutaneous nodules or induration palpable

## 2023-11-29 NOTE — H&P ADULT - NSHPREVIEWOFSYSTEMS_GEN_ALL_CORE
CONSTITUTIONAL: No weakness, fevers or chills, no weight loss  EYES/ENT: No visual changes;  No vertigo or throat pain   NECK: No pain or stiffness  RESPIRATORY: No cough, wheezing, hemoptysis; No shortness of breath  CARDIOVASCULAR: No chest pain or palpitations  GASTROINTESTINAL: + abdominal pain, + nausea. No diarrhea or constipation. No melena or hematochezia.  GENITOURINARY: No dysuria, frequency or hematuria  MSK: No joint swelling  NEUROLOGICAL: No numbness or weakness  SKIN: No itching, burning, rashes, or lesions   All other review of systems is negative unless indicated above.

## 2023-11-29 NOTE — ED PROVIDER NOTE - NSICDXPASTMEDICALHX_GEN_ALL_CORE_FT
PAST MEDICAL HISTORY:  Arthritis     Asthma WTC responder--followed by [facility] monitoring center    GERD (gastroesophageal reflux disease)     H/O melanoma excision Left thigh    H/O squamous cell carcinoma excision left hand    History of Thomson's esophagus     Hyperlipidemia     Sleep apnea CPAP

## 2023-11-29 NOTE — ED ADULT NURSE NOTE - OBJECTIVE STATEMENT
pt c/o abdominal pain. Pt states this is how he felt with his prior bowel obstruction. ABD distended

## 2023-11-29 NOTE — H&P ADULT - HISTORY OF PRESENT ILLNESS
63yoM PSHx open umbilical hernia repair >10yrs ago, hx of previous SBOs in the past (last one about 10 yrs ago), presenting to [facility] ED with abdominal pain since 1:30 AM, associated with nausea. Pt reports similar to his previous SBOs, denies flatus today, denies BM, denies radiation of pain. Patient denies fever, chills, SOB, chest pain, diarrhea.

## 2023-11-29 NOTE — H&P ADULT - PROBLEM SELECTOR PLAN 1
- Admit to Dr. Lord  - VSSAF, exam with distended abdomen, labs unremarkable, CT imaging with SBO.   - NGT placed in the ER, Xray confirmed location  - Maintain NGT ot LCWS  - I&Os overnight  - GGC in the AM  - Pain regimen/supportive care    Discussed with Dr. Lord.

## 2023-11-29 NOTE — ED ADULT NURSE REASSESSMENT NOTE - NS ED NURSE REASSESS COMMENT FT1
pt with vital signs stable admitted to floor awaiting bed assignment ngt to suction no c/o voiced at present

## 2023-11-29 NOTE — ED PROVIDER NOTE - CLINICAL SUMMARY MEDICAL DECISION MAKING FREE TEXT BOX
Khanna: 63 M hx SBO 2/2 adhesion from umbilical hernia repair c/o abd pain, vomiting, feels similar to prior SBO. States he had multiple SBOs in the past, last time was ~10 years ago. Last abd surgery >10 years ago. Pt reports feeling in his usual state of health yesterday until 1:30AM when he started having abd pain. At first thought was from brussel sprouts he ate which has caused GI discomfort in the past. Was walking around to try to relieve his discomfort. No flatus. No BM. Then started vomiting and became concerned that he is having an SBO. Denies f/c, cp, sob, urinary complaints. Pt pending CT imgaing at time of sign out. re eval and dispo pending.

## 2023-11-29 NOTE — H&P ADULT - NSICDXPASTMEDICALHX_GEN_ALL_CORE_FT
PAST MEDICAL HISTORY:  Arthritis     Asthma WTC responder--followed by [facility] monitoring center    GERD (gastroesophageal reflux disease)     H/O melanoma excision Left thigh    H/O squamous cell carcinoma excision left hand    History of Thomson's esophagus     Hyperlipidemia     Sleep apnea CPAP

## 2023-11-29 NOTE — ED ADULT NURSE NOTE - NSICDXPASTMEDICALHX_GEN_ALL_CORE_FT
PAST MEDICAL HISTORY:  Arthritis     Asthma WTC responder--followed by [facility] monitoring center    GERD (gastroesophageal reflux disease)     H/O melanoma excision Left thigh    H/O squamous cell carcinoma excision left hand    History of Thomson's esophagus     Hyperlipidemia     Sleep apnea CPAP

## 2023-11-29 NOTE — H&P ADULT - ASSESSMENT
63yoM PSHx open umbilical hernia repair >10yrs ago, hx of previous SBOs in the past (last one about 10 yrs ago), presenting to [facility] ED with abdominal pain since 1:30 AM, associated with nausea, no flatus, no BMs, similar to previous episodes of SBOs. VSSAF, labs unremarkable, imaging consistent with SBO, as well with portion of mesh no fully apposed to abd wall intervening between small bowel loops. NGT placed in the ED.    63yoM PSHx open umbilical hernia repair >10yrs ago, hx of previous SBOs in the past (last one about 10 yrs ago), presenting to [facility] ED with abdominal pain since 1:30 AM, associated with nausea, no flatus, no BMs, similar to previous episodes of SBOs. VSSAF, labs unremarkable, imaging consistent with SBO, as well with portion of mesh no fully apposed to abd wall intervening between small bowel loops. NGT placed in the ED.   Surg. Att.  Pt. seen and examined.   Ct reviewed.  Will initiate gastrografin challenge. Since its one of many episodes of sbo and there are issues with a mesh from hernia repair will schedule the pt.  for laparoscopy , enterolysis and mesh replacement if necessary..  Would like to obtain medical consult.

## 2023-11-29 NOTE — ED PROVIDER NOTE - CONSTITUTIONAL, MLM
Well appearing, awake, alert, oriented to person, place, time/situation in mild distress from pain. normal...

## 2023-11-29 NOTE — H&P ADULT - NSHPLABSRESULTS_GEN_ALL_CORE
16.0   10.51 )-----------( 228      ( 29 Nov 2023 13:08 )             47.8     11-29    137  |  98  |  14  ----------------------------<  138<H>  4.6   |  30  |  0.85    Ca    9.5      29 Nov 2023 13:08    TPro  7.3  /  Alb  3.7  /  TBili  0.9  /  DBili  x   /  AST  12<L>  /  ALT  20  /  AlkPhos  59  11-29    < from: CT Abdomen and Pelvis w/ Oral Cont and w/ IV Cont (11.29.23 @ 15:39) >    ACC: [id number] EXAM:  CT ABDOMEN AND PELVIS OC IC   ORDERED BY: TERA SALDANA     PROCEDURE DATE:  11/29/2023      INTERPRETATION:  CLINICAL INFORMATION: Concern for small bowel obstruction    COMPARISON: None.    CONTRAST/COMPLICATIONS:  IV Contrast: Omnipaque 350  90 cc administered   10 cc discarded  Oral Contrast: Omnipaque 300  Complications: None reported at time of study completion    PROCEDURE:  CT of the Abdomen and Pelvis was performed.  Sagittal and coronal reformats were performed.    FINDINGS:    LOWER CHEST: No visualized pleural effusion. Dependent lung scarring.   Coronary artery calcifications.    LIVER: Enlarged, near 19 cm the dimension. Main portal vein and hepatic   veins are patent  BILE DUCTS: No distention  GALLBLADDER: Unremarkable CT appearance  SPLEEN: Spleen size within normal limits  PANCREAS: No acute peripancreatic inflammation  ADRENALS: Unremarkable  KIDNEYS/URETERS: No hydronephrosis or ureteral calculus    BLADDER: Mildly distended  REPRODUCTIVE ORGANS: Prostate size is enlarged    BOWEL: Stomach is partially distended. Proximal small bowel is   nondistended. Mid small bowel is mildly distended and fecalized with   gradual transition and tortuosity adjacent to umbilical hernia mesh.   Distal small bowel is collapsed. Findings are concerning for small bowel   obstruction. Portion of the mesh is not fully apposed to the abdominal   wall and is seen intervening between small bowel loops, for example on   image 69 series 601. Adhesive disease in this location is suspected.   Appendix is not obstructed. Mild stool burden of the colon limits   evaluation of the colonic mucosa pericolonic diverticulosis, without   diverticulitis.  PERITONEUM: Trace ascites.  VESSELS: No abdominal aortic aneurysm. Atheromatous changes.  RETROPERITONEUM/LYMPH NODES: Small volume nodes.  ABDOMINAL WALL: Anterior abdominal wall mesh, with calcifications.   Portion of the mesh is not fully apposed to the abdominal wall and is   seen intervening between small bowel loops, for example on image 69   series 601 and image 75 series 2. Adhesive disease is likely present.  BONES: Degenerative changes. Spinal fusion and discectomy hardware is   noted at L2-L3 level. Central canal and neural foramina are not   adequately assessed on this study.    IMPRESSION:    Mid small bowel is mildly distended and fecalized with gradual transition   and tortuosity adjacent to umbilical hernia mesh. Distal small bowel is   collapsed. Findings are concerning for small bowel obstruction.    Portion of the mesh is not fully apposed to the abdominal wall and is   seen intervening between small bowel loops, for example on image 69   series 601 and image 75 series 2. Adhesive disease is likely present.    Trace ascites.    Coronary artery calcifications.    Dr. Hart discussed these findings with NADIRA Saldana on   11/29/2023 at 4:18 PM, with read back.    --- End of Report ---    BARAK HART M.D., ATTENDING RADIOLOGIST  This document has been electronically signed. Nov 29 2023  4:19PM

## 2023-11-29 NOTE — ED PROVIDER NOTE - NS ED ATTENDING STATEMENT MOD
I have seen and examined this patient and fully participated in the care of this patient as the teaching attending.  The service was shared with the COSME.  I reviewed and verified the documentation and independently performed the documented:

## 2023-11-29 NOTE — ED ADULT NURSE NOTE - NS PRO PASSIVE SMOKE EXP
No General Sunscreen Counseling: I recommended a broad spectrum sunscreen with a SPF of 30 or higher.  I explained that SPF 30 sunscreens block approximately 97 percent of the sun's harmful rays.  Sunscreens should be applied at least 15 minutes prior to expected sun exposure and then every 2 hours after that as long as sun exposure continues. If swimming or exercising sunscreen should be reapplied every 45 minutes to an hour after getting wet or sweating.  I also recommended a lip balm with a sunscreen as well. Sun protective clothing can be used in lieu of sunscreen but must be worn the entire time you are exposed to the sun's rays. Detail Level: Generalized

## 2023-11-29 NOTE — ED PROVIDER NOTE - OBJECTIVE STATEMENT
63 M hx SBO 2/2 adhesion from umbilical hernia repair c/o abd pain, vomiting, feels similar to prior SBO. States he had multiple SBOs in the past, last time was ~10 years ago. Last abd surgery >10 years ago. Pt reports feeling in his usual state of health yesterday until 1:30AM when he started having abd pain. At first thought was from brussel sprouts he ate which has caused GI discomfort in the past. Was walking around to try to relieve his discomfort. No flatus. No BM. Then started vomiting and became concerned that he is having an SBO. Denies f/c, cp, sob, urinary complaints.

## 2023-11-30 ENCOUNTER — TRANSCRIPTION ENCOUNTER (OUTPATIENT)
Age: 63
End: 2023-11-30

## 2023-11-30 ENCOUNTER — APPOINTMENT (OUTPATIENT)
Dept: MRI IMAGING | Facility: CLINIC | Age: 63
End: 2023-11-30
Payer: MEDICARE

## 2023-11-30 LAB
ANION GAP SERPL CALC-SCNC: 5 MMOL/L — SIGNIFICANT CHANGE UP (ref 5–17)
ANION GAP SERPL CALC-SCNC: 5 MMOL/L — SIGNIFICANT CHANGE UP (ref 5–17)
APPEARANCE UR: CLEAR — SIGNIFICANT CHANGE UP
APPEARANCE UR: CLEAR — SIGNIFICANT CHANGE UP
BASOPHILS # BLD AUTO: 0.04 K/UL — SIGNIFICANT CHANGE UP (ref 0–0.2)
BASOPHILS # BLD AUTO: 0.04 K/UL — SIGNIFICANT CHANGE UP (ref 0–0.2)
BASOPHILS NFR BLD AUTO: 0.4 % — SIGNIFICANT CHANGE UP (ref 0–2)
BASOPHILS NFR BLD AUTO: 0.4 % — SIGNIFICANT CHANGE UP (ref 0–2)
BILIRUB UR-MCNC: NEGATIVE — SIGNIFICANT CHANGE UP
BILIRUB UR-MCNC: NEGATIVE — SIGNIFICANT CHANGE UP
BUN SERPL-MCNC: 14 MG/DL — SIGNIFICANT CHANGE UP (ref 7–23)
BUN SERPL-MCNC: 14 MG/DL — SIGNIFICANT CHANGE UP (ref 7–23)
CALCIUM SERPL-MCNC: 8.8 MG/DL — SIGNIFICANT CHANGE UP (ref 8.5–10.1)
CALCIUM SERPL-MCNC: 8.8 MG/DL — SIGNIFICANT CHANGE UP (ref 8.5–10.1)
CHLORIDE SERPL-SCNC: 104 MMOL/L — SIGNIFICANT CHANGE UP (ref 96–108)
CHLORIDE SERPL-SCNC: 104 MMOL/L — SIGNIFICANT CHANGE UP (ref 96–108)
CO2 SERPL-SCNC: 27 MMOL/L — SIGNIFICANT CHANGE UP (ref 22–31)
CO2 SERPL-SCNC: 27 MMOL/L — SIGNIFICANT CHANGE UP (ref 22–31)
COLOR SPEC: YELLOW — SIGNIFICANT CHANGE UP
COLOR SPEC: YELLOW — SIGNIFICANT CHANGE UP
CREAT SERPL-MCNC: 0.86 MG/DL — SIGNIFICANT CHANGE UP (ref 0.5–1.3)
CREAT SERPL-MCNC: 0.86 MG/DL — SIGNIFICANT CHANGE UP (ref 0.5–1.3)
DIFF PNL FLD: NEGATIVE — SIGNIFICANT CHANGE UP
DIFF PNL FLD: NEGATIVE — SIGNIFICANT CHANGE UP
EGFR: 96 ML/MIN/1.73M2 — SIGNIFICANT CHANGE UP
EGFR: 96 ML/MIN/1.73M2 — SIGNIFICANT CHANGE UP
EOSINOPHIL # BLD AUTO: 0.08 K/UL — SIGNIFICANT CHANGE UP (ref 0–0.5)
EOSINOPHIL # BLD AUTO: 0.08 K/UL — SIGNIFICANT CHANGE UP (ref 0–0.5)
EOSINOPHIL NFR BLD AUTO: 0.8 % — SIGNIFICANT CHANGE UP (ref 0–6)
EOSINOPHIL NFR BLD AUTO: 0.8 % — SIGNIFICANT CHANGE UP (ref 0–6)
GLUCOSE SERPL-MCNC: 101 MG/DL — HIGH (ref 70–99)
GLUCOSE SERPL-MCNC: 101 MG/DL — HIGH (ref 70–99)
GLUCOSE UR QL: NEGATIVE MG/DL — SIGNIFICANT CHANGE UP
GLUCOSE UR QL: NEGATIVE MG/DL — SIGNIFICANT CHANGE UP
HCT VFR BLD CALC: 44.5 % — SIGNIFICANT CHANGE UP (ref 39–50)
HCT VFR BLD CALC: 44.5 % — SIGNIFICANT CHANGE UP (ref 39–50)
HCV AB S/CO SERPL IA: 0.17 S/CO — SIGNIFICANT CHANGE UP (ref 0–0.99)
HCV AB S/CO SERPL IA: 0.17 S/CO — SIGNIFICANT CHANGE UP (ref 0–0.99)
HCV AB SERPL-IMP: SIGNIFICANT CHANGE UP
HCV AB SERPL-IMP: SIGNIFICANT CHANGE UP
HGB BLD-MCNC: 15.3 G/DL — SIGNIFICANT CHANGE UP (ref 13–17)
HGB BLD-MCNC: 15.3 G/DL — SIGNIFICANT CHANGE UP (ref 13–17)
IMM GRANULOCYTES NFR BLD AUTO: 0.4 % — SIGNIFICANT CHANGE UP (ref 0–0.9)
IMM GRANULOCYTES NFR BLD AUTO: 0.4 % — SIGNIFICANT CHANGE UP (ref 0–0.9)
KETONES UR-MCNC: 15 MG/DL
KETONES UR-MCNC: 15 MG/DL
LEUKOCYTE ESTERASE UR-ACNC: NEGATIVE — SIGNIFICANT CHANGE UP
LEUKOCYTE ESTERASE UR-ACNC: NEGATIVE — SIGNIFICANT CHANGE UP
LYMPHOCYTES # BLD AUTO: 2.03 K/UL — SIGNIFICANT CHANGE UP (ref 1–3.3)
LYMPHOCYTES # BLD AUTO: 2.03 K/UL — SIGNIFICANT CHANGE UP (ref 1–3.3)
LYMPHOCYTES # BLD AUTO: 20.9 % — SIGNIFICANT CHANGE UP (ref 13–44)
LYMPHOCYTES # BLD AUTO: 20.9 % — SIGNIFICANT CHANGE UP (ref 13–44)
MAGNESIUM SERPL-MCNC: 2.1 MG/DL — SIGNIFICANT CHANGE UP (ref 1.6–2.6)
MAGNESIUM SERPL-MCNC: 2.1 MG/DL — SIGNIFICANT CHANGE UP (ref 1.6–2.6)
MCHC RBC-ENTMCNC: 31.9 PG — SIGNIFICANT CHANGE UP (ref 27–34)
MCHC RBC-ENTMCNC: 31.9 PG — SIGNIFICANT CHANGE UP (ref 27–34)
MCHC RBC-ENTMCNC: 34.4 GM/DL — SIGNIFICANT CHANGE UP (ref 32–36)
MCHC RBC-ENTMCNC: 34.4 GM/DL — SIGNIFICANT CHANGE UP (ref 32–36)
MCV RBC AUTO: 92.9 FL — SIGNIFICANT CHANGE UP (ref 80–100)
MCV RBC AUTO: 92.9 FL — SIGNIFICANT CHANGE UP (ref 80–100)
MONOCYTES # BLD AUTO: 0.72 K/UL — SIGNIFICANT CHANGE UP (ref 0–0.9)
MONOCYTES # BLD AUTO: 0.72 K/UL — SIGNIFICANT CHANGE UP (ref 0–0.9)
MONOCYTES NFR BLD AUTO: 7.4 % — SIGNIFICANT CHANGE UP (ref 2–14)
MONOCYTES NFR BLD AUTO: 7.4 % — SIGNIFICANT CHANGE UP (ref 2–14)
NEUTROPHILS # BLD AUTO: 6.78 K/UL — SIGNIFICANT CHANGE UP (ref 1.8–7.4)
NEUTROPHILS # BLD AUTO: 6.78 K/UL — SIGNIFICANT CHANGE UP (ref 1.8–7.4)
NEUTROPHILS NFR BLD AUTO: 70.1 % — SIGNIFICANT CHANGE UP (ref 43–77)
NEUTROPHILS NFR BLD AUTO: 70.1 % — SIGNIFICANT CHANGE UP (ref 43–77)
NITRITE UR-MCNC: NEGATIVE — SIGNIFICANT CHANGE UP
NITRITE UR-MCNC: NEGATIVE — SIGNIFICANT CHANGE UP
NRBC # BLD: 0 /100 WBCS — SIGNIFICANT CHANGE UP (ref 0–0)
NRBC # BLD: 0 /100 WBCS — SIGNIFICANT CHANGE UP (ref 0–0)
PH UR: 6.5 — SIGNIFICANT CHANGE UP (ref 5–8)
PH UR: 6.5 — SIGNIFICANT CHANGE UP (ref 5–8)
PHOSPHATE SERPL-MCNC: 3.4 MG/DL — SIGNIFICANT CHANGE UP (ref 2.5–4.5)
PHOSPHATE SERPL-MCNC: 3.4 MG/DL — SIGNIFICANT CHANGE UP (ref 2.5–4.5)
PLATELET # BLD AUTO: 187 K/UL — SIGNIFICANT CHANGE UP (ref 150–400)
PLATELET # BLD AUTO: 187 K/UL — SIGNIFICANT CHANGE UP (ref 150–400)
POTASSIUM SERPL-MCNC: 4 MMOL/L — SIGNIFICANT CHANGE UP (ref 3.5–5.3)
POTASSIUM SERPL-MCNC: 4 MMOL/L — SIGNIFICANT CHANGE UP (ref 3.5–5.3)
POTASSIUM SERPL-SCNC: 4 MMOL/L — SIGNIFICANT CHANGE UP (ref 3.5–5.3)
POTASSIUM SERPL-SCNC: 4 MMOL/L — SIGNIFICANT CHANGE UP (ref 3.5–5.3)
PROT UR-MCNC: NEGATIVE MG/DL — SIGNIFICANT CHANGE UP
PROT UR-MCNC: NEGATIVE MG/DL — SIGNIFICANT CHANGE UP
RBC # BLD: 4.79 M/UL — SIGNIFICANT CHANGE UP (ref 4.2–5.8)
RBC # BLD: 4.79 M/UL — SIGNIFICANT CHANGE UP (ref 4.2–5.8)
RBC # FLD: 12.7 % — SIGNIFICANT CHANGE UP (ref 10.3–14.5)
RBC # FLD: 12.7 % — SIGNIFICANT CHANGE UP (ref 10.3–14.5)
SODIUM SERPL-SCNC: 136 MMOL/L — SIGNIFICANT CHANGE UP (ref 135–145)
SODIUM SERPL-SCNC: 136 MMOL/L — SIGNIFICANT CHANGE UP (ref 135–145)
SP GR SPEC: 1.01 — SIGNIFICANT CHANGE UP (ref 1–1.03)
SP GR SPEC: 1.01 — SIGNIFICANT CHANGE UP (ref 1–1.03)
UROBILINOGEN FLD QL: 0.2 MG/DL — SIGNIFICANT CHANGE UP (ref 0.2–1)
UROBILINOGEN FLD QL: 0.2 MG/DL — SIGNIFICANT CHANGE UP (ref 0.2–1)
WBC # BLD: 9.69 K/UL — SIGNIFICANT CHANGE UP (ref 3.8–10.5)
WBC # BLD: 9.69 K/UL — SIGNIFICANT CHANGE UP (ref 3.8–10.5)
WBC # FLD AUTO: 9.69 K/UL — SIGNIFICANT CHANGE UP (ref 3.8–10.5)
WBC # FLD AUTO: 9.69 K/UL — SIGNIFICANT CHANGE UP (ref 3.8–10.5)

## 2023-11-30 PROCEDURE — 74250 X-RAY XM SM INT 1CNTRST STD: CPT | Mod: 26

## 2023-11-30 PROCEDURE — 99233 SBSQ HOSP IP/OBS HIGH 50: CPT

## 2023-11-30 RX ORDER — INFLUENZA VIRUS VACCINE 15; 15; 15; 15 UG/.5ML; UG/.5ML; UG/.5ML; UG/.5ML
0.7 SUSPENSION INTRAMUSCULAR ONCE
Refills: 0 | Status: DISCONTINUED | OUTPATIENT
Start: 2023-11-30 | End: 2023-12-07

## 2023-11-30 RX ADMIN — Medication 30 MILLIGRAM(S): at 00:08

## 2023-11-30 RX ADMIN — DIATRIZOATE MEGLUMINE 30 MILLILITER(S): 180 INJECTION, SOLUTION INTRAVESICAL at 10:56

## 2023-11-30 RX ADMIN — Medication 400 MILLIGRAM(S): at 10:05

## 2023-11-30 RX ADMIN — PANTOPRAZOLE SODIUM 40 MILLIGRAM(S): 20 TABLET, DELAYED RELEASE ORAL at 12:10

## 2023-11-30 RX ADMIN — Medication 1000 MILLIGRAM(S): at 10:51

## 2023-11-30 RX ADMIN — SODIUM CHLORIDE 125 MILLILITER(S): 9 INJECTION, SOLUTION INTRAVENOUS at 16:14

## 2023-11-30 RX ADMIN — SODIUM CHLORIDE 125 MILLILITER(S): 9 INJECTION, SOLUTION INTRAVENOUS at 00:29

## 2023-11-30 NOTE — DISCHARGE NOTE PROVIDER - HOSPITAL COURSE
HPI: 63yoM PSHx open umbilical hernia repair >10yrs ago, hx of previous SBOs in the past (last one about 10 yrs ago), presenting to [facility] ED with abdominal pain since 1:30 AM, associated with nausea. Pt reports similar to his previous SBOs, denies flatus today, denies BM, denies radiation of pain. Patient denies fever, chills, SOB, chest pain, diarrhea. (29 Nov 2023 19:11)    CTAP in the ER suggesting SBO 2/2 adhesion of SB to posterior aspect of previous umbilical hernia mesh. PT admitted to surgery, NG tube placed in ER.    Throughout hospital stay, patient was continued on antibiotics, given pain control as appropriate. Diet was advanced as tolerated by the patient. Lab values were monitored with repletion of electrolytes as necessary.     Pt deemed surgically stable for discharge on ________ HPI: 63yoM PSHx open umbilical hernia repair >10yrs ago, hx of previous SBOs in the past (last one about 10 yrs ago), presenting to [facility] ED with abdominal pain since 1:30 AM, associated with nausea. Pt reports similar to his previous SBOs, denies flatus today, denies BM, denies radiation of pain. Patient denies fever, chills, SOB, chest pain, diarrhea. (29 Nov 2023 19:11)    CTAP in the ER suggesting SBO 2/2 adhesion of SB to posterior aspect of previous umbilical hernia mesh. PT admitted to surgery, NG tube placed in ER.    Throughout hospital stay, patient was continued on antibiotics, given pain control as appropriate. Diet was advanced as tolerated by the patient. Lab values were monitored with repletion of electrolytes as necessary.     Pt deemed surgically stable for discharge on ________ HPI: 63yoM PSHx open umbilical hernia repair >10yrs ago, hx of previous SBOs in the past (last one about 10 yrs ago), presenting to [facility] ED with abdominal pain since 1:30 AM, associated with nausea. Pt reports similar to his previous SBOs, denies flatus today, denies BM, denies radiation of pain. Patient denies fever, chills, SOB, chest pain, diarrhea. (29 Nov 2023 19:11)    CTAP in the ER suggesting SBO 2/2 adhesion of SB to posterior aspect of previous umbilical hernia mesh. PT admitted to surgery, NG tube placed in ER.    Throughout hospital stay, patient was continued on antibiotics, given pain control as appropriate. Diet was advanced as tolerated by the patient. Lab values were monitored with repletion of electrolytes as necessary.     Pt deemed surgically stable for discharge on ________ HPI: 63yoM PSHx open umbilical hernia repair >10yrs ago, hx of previous SBOs in the past (last one about 10 yrs ago), presenting to [facility] ED with abdominal pain since 1:30 AM, associated with nausea. Pt reports similar to his previous SBOs, denies flatus today, denies BM, denies radiation of pain. Patient denies fever, chills, SOB, chest pain, diarrhea. (29 Nov 2023 19:11)    CTAP in the ER suggesting SBO 2/2 adhesion of SB to posterior aspect of previous umbilical hernia mesh. PT admitted to surgery, NG tube placed in ER.    Patient currently POD#1, S/p Open Inguinal Hernia Repair with Mesh 2/2 incarcerated right inguinal hernia containing bowel (11/30). Patient currently on regular diet, tolerating well. On abdominal exam, patient soft, nondistended, appropriately tender around wound site. Wound site clean, dry, and intact without oozing of blood or drainage, no signs of infection.  Throughout hospital stay, patient was continued on antibiotics, given pain control as appropriate. Lab values were monitored with repletion of electrolytes as necessary.     Pt deemed surgically stable for discharge on 12/1/23 HPI: 63yoM PSHx open umbilical hernia repair >10yrs ago, hx of previous SBOs in the past (last one about 10 yrs ago), presenting to [facility] ED with abdominal pain since 1:30 AM, associated with nausea. Pt reports similar to his previous SBOs, denies flatus today, denies BM, denies radiation of pain. Patient denies fever, chills, SOB, chest pain, diarrhea. (29 Nov 2023 19:11)    CTAP in the ER suggesting SBO 2/2 adhesion of SB to posterior aspect of previous umbilical hernia mesh. PT admitted to surgery, NG tube placed in ER.    Patient currently POD#1, S/p Open Inguinal Hernia Repair with Mesh 2/2 incarcerated right inguinal hernia containing bowel (11/30). Patient currently on regular diet, tolerating well. On abdominal exam, patient soft, nondistended, appropriately tender around wound site. Wound site clean, dry, and intact without oozing of blood or drainage, no signs of infection.  Throughout hospital stay, patient was continued on antibiotics, given pain control as appropriate. Lab values were monitored with repletion of electrolytes as necessary.     Pt deemed surgically stable for discharge on 12/1/23 HPI: 63yoM PSHx open umbilical hernia repair >10yrs ago, hx of previous SBOs in the past (last one about 10 yrs ago), presenting to [facility] ED with abdominal pain since 1:30 AM, associated with nausea. Pt reports similar to his previous SBOs, denies flatus today, denies BM, denies radiation of pain. Patient denies fever, chills, SOB, chest pain, diarrhea. (29 Nov 2023 19:11)    CTAP in the ER suggesting SBO 2/2 adhesion of SB to posterior aspect of previous umbilical hernia mesh. PT admitted to surgery, NG tube placed in ER.   HPI: 63yoM PSHx open umbilical hernia repair >10yrs ago, hx of previous SBOs in the past (last one about 10 yrs ago), presenting to [facility] ED with abdominal pain since 1:30 AM, associated with nausea. Pt reports similar to his previous SBOs, denies flatus today, denies BM, denies radiation of pain. Patient denies fever, chills, SOB, chest pain, diarrhea. (29 Nov 2023 19:11)    CTAP in the ER suggesting SBO 2/2 adhesion of SB to posterior aspect of previous umbilical hernia mesh. PT admitted to surgery, NG tube placed in ER.   HPI: 63yoM PSHx open umbilical hernia repair >10yrs ago, hx of previous SBOs in the past (last one about 10 yrs ago), presenting to [facility] ED with abdominal pain since 1:30 AM, associated with nausea. Pt reports similar to his previous SBOs, denies flatus today, denies BM, denies radiation of pain. Patient denies fever, chills, SOB, chest pain, diarrhea. (29 Nov 2023 19:11)    CTAP in the ER suggesting SBO 2/2 adhesion of SB to posterior aspect of previous umbilical hernia mesh. PT admitted to surgery, NG tube placed in ER.   HPI: 63yoM PSHx open umbilical hernia repair >10yrs ago, hx of previous SBOs in the past (last one about 10 yrs ago), presenting to [facility] ED with abdominal pain since 1:30 AM, associated with nausea. Pt reports similar to his previous SBOs, denies flatus today, denies BM, denies radiation of pain. Patient denies fever, chills, SOB, chest pain, diarrhea. (29 Nov 2023 19:11)    CTAP in the ER suggesting SBO 2/2 adhesion of SB to posterior aspect of previous umbilical hernia mesh. PT admitted to surgery, NG tube placed in ER, later removed after gastrograffin challenge showed passage into large bowel. Patient booked for enterolysis and is now POD #2, doing well.    Today the patient is feeling well, vital signs are stable, labs are within normal limits. Pain is well controlled on oral pain medication. The patient is tolerating a diet without nausea, and ambulating independently.  Patient is stable and ready for discharge today.   HPI: 63yoM PSHx open umbilical hernia repair >10yrs ago, hx of previous SBOs in the past (last one about 10 yrs ago), presenting to [facility] ED with abdominal pain since 1:30 AM, associated with nausea. Pt reports similar to his previous SBOs, denies flatus today, denies BM, denies radiation of pain. Patient denies fever, chills, SOB, chest pain, diarrhea. (29 Nov 2023 19:11)    CTAP in the ER suggesting SBO 2/2 adhesion of SB to posterior aspect of previous umbilical hernia mesh. PT admitted to surgery, NG tube placed in ER, later removed after gastrograffin challenge showed passage into large bowel. Patient booked for enterolysis and is now POD #2, doing well.    Today the patient is feeling well, vital signs are stable, labs are within normal limits. Pain is well controlled on oral pain medication. The patient is tolerating a diet without nausea, and ambulating independently.  Patient is stable and ready for discharge today.   HPI: 63yoM PSHx open umbilical hernia repair >10yrs ago, hx of previous SBOs in the past (last one about 10 yrs ago), presenting to \[facility]\"" ED with abdominal pain since 1:30 AM, associated with nausea. Pt reports similar to his previous SBOs, denies flatus today, denies BM, denies radiation of pain. Patient denies fever, chills, SOB, chest pain, diarrhea. (29 Nov 2023 19:11)    CTAP in the ER suggesting SBO 2/2 adhesion of SB to posterior aspect of previous umbilical hernia mesh. PT admitted to surgery, NG tube placed in ER, later removed after gastrograffin challenge showed passage into large bowel. Patient booked for enterolysis and is now POD #2, doing well.    Today the patient is feeling well, vital signs are stable, labs are within normal limits. Pain is well controlled on oral pain medication. The patient is tolerating a diet without nausea, and ambulating independently.  Patient is stable and ready for discharge today.   HPI: 63yoM PSHx open umbilical hernia repair >10yrs ago, hx of previous SBOs in the past (last one about 10 yrs ago), presenting to [facility] ED with abdominal pain since 1:30 AM, associated with nausea. Pt reports similar to his previous SBOs, denies flatus today, denies BM, denies radiation of pain. Patient denies fever, chills, SOB, chest pain, diarrhea. (29 Nov 2023 19:11)    CTAP in the ER suggesting SBO 2/2 adhesion of SB to posterior aspect of previous umbilical hernia mesh. PT admitted to surgery, NG tube placed in ER, later removed after gastrograffin challenge showed passage into large bowel. Patient booked for enterolysis. On 12/2 patient went to the OR, underwent lysis of adhesions & SBR w/ primary anastomosis. Pt tolerated the procedure well, he was then sent to PACU then to the floor for further monitoring. Diet was slowly advanced. Labs were monitored. Pain controlled.     On POD#3 patient is feeling well, vital signs are stable, labs are within normal limits. Pain is well controlled on oral pain medication. The patient is tolerating a diet without nausea, and ambulating independently.  Patient is stable and ready for discharge today.   HPI: 63yoM PSHx open umbilical hernia repair >10yrs ago, hx of previous SBOs in the past (last one about 10 yrs ago), presenting to [facility] ED with abdominal pain since 1:30 AM, associated with nausea. Pt reports similar to his previous SBOs, denies flatus today, denies BM, denies radiation of pain. Patient denies fever, chills, SOB, chest pain, diarrhea. (29 Nov 2023 19:11)    CTAP in the ER suggesting SBO 2/2 adhesion of SB to posterior aspect of previous umbilical hernia mesh. PT admitted to surgery, NG tube placed in ER, later removed after gastrograffin challenge showed passage into large bowel. Patient booked for enterolysis. On 12/2 patient went to the OR, underwent lysis of adhesions & SBR w/ primary anastomosis. Pt tolerated the procedure well, he was then sent to PACU then to the floor for further monitoring. Diet was slowly advanced. Labs were monitored. Pain controlled.     On POD#3 patient is feeling well, vital signs are stable, labs are within normal limits. Pain is well controlled on oral pain medication. The patient is tolerating a diet without nausea, and ambulating independently.  Patient is stable and ready for discharge today.   HPI: 63yoM PSHx open umbilical hernia repair >10yrs ago, hx of previous SBOs in the past (last one about 10 yrs ago), presenting to [facility] ED with abdominal pain since 1:30 AM, associated with nausea. Pt reports similar to his previous SBOs, denies flatus today, denies BM, denies radiation of pain. Patient denies fever, chills, SOB, chest pain, diarrhea. (29 Nov 2023 19:11)    CTAP in the ER suggesting SBO 2/2 adhesion of SB to posterior aspect of previous umbilical hernia mesh. PT admitted to surgery, NG tube placed in ER, later removed after gastrograffin challenge showed passage into large bowel. Patient booked for enterolysis. On 12/2 patient went to the OR, underwent lysis of adhesions & SBR w/ primary anastomosis. Pt tolerated the procedure well, he was then sent to PACU then to the floor for further monitoring. Diet was slowly advanced. Labs were monitored. Pain controlled.     On POD#3 patient is feeling well, vital signs are stable, labs are within normal limits. Pain is well controlled on oral pain medication. The patient is tolerating a diet without nausea, and ambulating independently.  Patient is stable and ready for discharge today.   HPI: 63yoM PSHx open umbilical hernia repair >10yrs ago, hx of previous SBOs in the past (last one about 10 yrs ago), presenting to [facility] ED with abdominal pain since 1:30 AM, associated with nausea. Pt reports similar to his previous SBOs, denies flatus today, denies BM, denies radiation of pain. Patient denies fever, chills, SOB, chest pain, diarrhea. (29 Nov 2023 19:11)    CTAP in the ER suggesting SBO 2/2 adhesion of SB to posterior aspect of previous umbilical hernia mesh. PT admitted to surgery, NG tube placed in ER, later removed after gastrograffin challenge showed passage into large bowel. Patient booked for enterolysis. On 12/2 patient went to the OR, underwent lysis of adhesions & SBR w/ primary anastomosis. Pt tolerated the procedure well, he was then sent to PACU then to the floor for further monitoring. Diet was slowly advanced. Labs were monitored. Pain controlled.     On POD#3 patient is feeling well, vital signs are stable, labs are within normal limits. Pain is well controlled on oral pain medication. The patient is tolerating a diet without nausea, having bowel function, and ambulating independently.  Patient is stable and ready for discharge today.   HPI: 63yoM PSHx open umbilical hernia repair >10yrs ago, hx of previous SBOs in the past (last one about 10 yrs ago), presenting to \[facility]\"" ED with abdominal pain since 1:30 AM, associated with nausea. Pt reports similar to his previous SBOs, denies flatus today, denies BM, denies radiation of pain. Patient denies fever, chills, SOB, chest pain, diarrhea. (29 Nov 2023 19:11)    CTAP in the ER suggesting SBO 2/2 adhesion of SB to posterior aspect of previous umbilical hernia mesh. PT admitted to surgery, NG tube placed in ER, later removed after gastrograffin challenge showed passage into large bowel. Patient booked for enterolysis. On 12/2 patient went to the OR, underwent lysis of adhesions & SBR w/ primary anastomosis. Pt tolerated the procedure well, he was then sent to PACU then to the floor for further monitoring. Diet was slowly advanced. Labs were monitored. Pain controlled.     On POD#3 patient is feeling well, vital signs are stable, labs are within normal limits. Pain is well controlled on oral pain medication. The patient is tolerating a diet without nausea, having bowel function, and ambulating independently.  Patient is stable and ready for discharge today.   HPI: 63yoM PSHx open umbilical hernia repair >10yrs ago, hx of previous SBOs in the past (last one about 10 yrs ago), presenting to [facility] ED with abdominal pain since 1:30 AM, associated with nausea. Pt reports similar to his previous SBOs, denies flatus today, denies BM, denies radiation of pain. Patient denies fever, chills, SOB, chest pain, diarrhea. (29 Nov 2023 19:11)    CTAP in the ER suggesting SBO 2/2 adhesion of SB to posterior aspect of previous umbilical hernia mesh. PT admitted to surgery, NG tube placed in ER, later removed after gastrograffin challenge showed passage into large bowel. Patient booked for enterolysis. On 12/2 patient went to the OR, underwent lysis of adhesions & SBR w/ primary anastomosis. Pt tolerated the procedure well, he was then sent to PACU then to the floor for further monitoring. Diet was slowly advanced. Labs were monitored. Pain controlled.     On POD#3 patient is feeling well, vital signs are stable, labs are within normal limits. Pain is well controlled on oral pain medication. The patient is tolerating a diet without nausea, having bowel function, and ambulating independently.  Patient is stable and ready for discharge today.

## 2023-11-30 NOTE — DISCHARGE NOTE PROVIDER - CARE PROVIDER_API CALL
Silas Lord  Surgery  [address] [place] [id number]  Phone: [phone]  Fax: [phone]  Follow Up Time: 1 week   Silas Lord  Surgery  [address] [place] [id number]  Phone: [phone]  Fax: [phone]  Follow Up Time: 1 week   Silas Lord  Surgery  [address] [place] [id number]  Phone: [phone]  Fax: [phone]  Follow Up Time: 1 week

## 2023-11-30 NOTE — PATIENT PROFILE ADULT - FUNCTIONAL ASSESSMENT - BASIC MOBILITY 6.
4-calculated by average /Not able to assess (calculate score using [facility] averaging method)

## 2023-11-30 NOTE — DISCHARGE NOTE PROVIDER - NSDCFUSCHEDAPPT_GEN_ALL_CORE_FT
[facility]  CATSCAN [address]  Scheduled Appointment: 11/30/2023    [facility]  MRI [address]  Scheduled Appointment: 11/30/2023     [facility]  CATSCAN [address]  Scheduled Appointment: 11/30/2023    [facility]  MRI [address]  Scheduled Appointment: 11/30/2023

## 2023-11-30 NOTE — DISCHARGE NOTE PROVIDER - NSDCFUADDINST_GEN_ALL_CORE_FT
Please no heavy lifting, more than 15 pounds for 6 weeks from the date of your surgery  You are able to shower, please let water run on your wound without direct scrubbing  If you have a temperature of more than 101.0, see redness or drainage from your wound, please contact our office or go to your nearest  You may shower in 24 hours.  Do not remove wound glue.  Do not let water hit wounds directly, do not rub incisions.      No heavy lifting (nothing heavier than 15 lbs. for 6 weeks following surgery), no strenuous physical activity, no exercise.    You may perform your usual daily tasks as tolerated.    Do not drive for 24 hours after surgery/anesthesia.  Do not drive while taking narcotic pain medication.  Do not drive until pain-free.    You are encouraged to walk as much as possible to prevent blood clots in the legs, to maintain lung health after surgery, and to prevent constipation while taking narcotic pain medication.      Eat a low fiber diet for the next few weeks.     For post-operative pain, take the followin. Tylenol Extra-Strength over the counter 500mg pills - take 2 every six hours regularly for the first 4-5 days after surgery, then as needed for pain.     2. Ibuprofen over-the-counter 200mg pills - take 3 pills every 6 hours regularly for the first 4-5 days after surgery (take with food), then as needed for pain.   3. Take Oxycodone as prescribed if you still have pain despite the Tylenol and Motrin combination.      All 3 medications can be taken together.      Follow-up with Dr. Lord in his office next week - call office for appointment.      If you have a temperature of more than 101.0, see redness or drainage from your wound, please contact our office or go to your nearest ER.

## 2023-11-30 NOTE — PROGRESS NOTE ADULT - SUBJECTIVE AND OBJECTIVE BOX
INTERVAL HPI/OVERNIGHT EVENTS: Patient seen and evaluated at bedside and found hemodynamically stable and in no acute distress. No acute events overnight. Pain is well controlled with PO/IV pain medication. Discomfort/distention is much improved since NGT placement. Currently NPO, without nausea or emesis. Denies passing gas or having bowel movements. Denies fevers, chills, chest pain, SOB, coughing, dizziness, or generalized malaise.      MEDICATIONS  (STANDING):  diatrizoate meglumine/diatrizoate sodium. 100 milliLiter(s) Oral once  influenza  Vaccine (HIGH DOSE) 0.7 milliLiter(s) IntraMuscular once  lactated ringers. 1000 milliLiter(s) (125 mL/Hr) IV Continuous <Continuous>  pantoprazole  Injectable 40 milliGRAM(s) IV Push daily    MEDICATIONS  (PRN):  acetaminophen   IVPB .. 1000 milliGRAM(s) IV Intermittent once PRN Severe Pain (7 - 10)  albuterol    90 MICROgram(s) HFA Inhaler 1 Puff(s) Inhalation every 6 hours PRN Shortness of Breath and/or Wheezing    Vital Signs Last 24 Hrs  T(C): 36.7 (2023 05:36), Max: 36.9 (2023 17:08)  T(F): 98.1 (2023 05:36), Max: 98.5 (2023 17:08)  HR: 72 (2023 05:36) (72 - 88)  BP: 153/78 (2023 05:36) (129/81 - 166/84)  RR: 19 (2023 05:36) (16 - 19)  SpO2: 94% (2023 05:36) (93% - 98%)  Patient On (Oxygen Delivery Method): room air      PHYSICAL EXAM:  General: lying in bed in no acute distress  HEENT: NGT in place with 200 cc output. Neck supple  Chest: non-labored breathing or conversational dyspnea   Abdomen: minimal distention, soft and depressible, non-tender. No guarding or rebound, non-peritonitic  Ext: no edema or cyanosis     I&O's Detail  2023 07:01  -  2023 07:00  --------------------------------------------------------  IN:    Lactated Ringers: 750 mL  Total IN: 750 mL  OUT:    Nasogastric/Oral tube (mL): 200 mL  Total OUT: 200 mL  Total NET: 550 mL      LABS:             16.0   10.51 )-----------( 228      ( 2023 13:08 )             47.8     137  |  98  |  14  ----------------------------<  138<H>  4.6   |  30  |  0.85    Ca    9.5      2023 13:08  TPro  7.3  /  Alb  3.7  /  TBili  0.9  /  DBili  x   /  AST  12<L>  /  ALT  20  /  AlkPhos  59  11-29  PT/INR - ( 2023 13:08 )   PT: 10.3 sec;   INR: 0.88 ratio    PTT - ( 2023 13:08 )  PTT:31.6 sec    Urinalysis Basic - ( 2023 00:40 )  Color: Yellow / Appearance: Clear / S.007 / pH: x  Gluc: x / Ketone: 15 mg/dL  / Bili: Negative / Urobili: 0.2 mg/dL   Blood: x / Protein: Negative mg/dL / Nitrite: Negative   Leuk Esterase: Negative / RBC: x / WBC x   Sq Epi: x / Non Sq Epi: x / Bacteria: x      ASSESSMENT AND PLAN:  Brian Gomez is a 64 y/o M with hx of ventral hernia repair with mesh 15 years ago, recurrent SBO; now presenting SBO. CT demonstrating association with implanted mesh. 4th such episode of SBO, last one 10 years ago. Patient clinically improved with NGT, with benign abdominal exam. VS wnl and stable.     - FU AM labs  - GGC this AM  - c/w NGT/NPO/IVF  - c/w pain control  - patient amenable to surgical repair; booked for tomorrow, 2nd add-on    To be discussed with attending, Dr. Lord.     Danny Seymour MD  PGY-1 Surgery

## 2023-11-30 NOTE — PROGRESS NOTE ADULT - ASSESSMENT
Surg. Att.  Pt . was in Radiology during my rounds, but as per surgical team he was improving.  AXR shows radiologic resolution of sbo.  Will remove the NGT.   There is no OR time tomorrow, so surgery will be postponed until Monday-this will be discussed with the patient.

## 2023-11-30 NOTE — PATIENT PROFILE ADULT - ABILITY TO HEAR (WITH HEARING AID OR HEARING APPLIANCE IF NORMALLY USED):
wears renae hearing aids. Did not bring/Mildly to Moderately Impaired: difficulty hearing in some environments or speaker may need to increase volume or speak distinctly

## 2023-11-30 NOTE — DISCHARGE NOTE PROVIDER - NSDCMRMEDTOKEN_GEN_ALL_CORE_FT
Adult Aspirin 325 mg oral tablet: 1 tab(s) orally once a day MDD:1 Tablet  Advair Diskus 250 mcg-50 mcg inhalation powder: 1 puff(s) inhaled 2 times a day  albuterol CFC free 90 mcg/inh inhalation aerosol:  inhaled , As Needed  atorvastatin 20 mg oral tablet: 1 tab(s) orally once a day  Colace 100 mg oral capsule: 1 cap(s) orally 2 times a day, As Needed MDD:2 capsules  omeprazole 20 mg oral delayed release capsule: 1 cap(s) orally once a day  ondansetron 4 mg oral tablet: 1 tab(s) orally every 4 hours MDD:6 tablets  oxyCODONE 5 mg oral tablet: 1 tab(s) orally every 4 hours, As Needed MDD:6 tablets   Adult Aspirin 325 mg oral tablet: 1 tab(s) orally once a day MDD:1 Tablet  Advair Diskus 250 mcg-50 mcg inhalation powder: 1 puff(s) inhaled 2 times a day  atorvastatin 20 mg oral tablet: 1 tab(s) orally once a day  Colace 100 mg oral capsule: 1 cap(s) orally 2 times a day, As Needed MDD:2 capsules  omeprazole 20 mg oral delayed release capsule: 1 cap(s) orally once a day   Advair Diskus 250 mcg-50 mcg inhalation powder: 1 puff(s) inhaled 2 times a day  omeprazole 40 mg oral delayed release capsule: 1 cap(s) orally   acetaminophen 500 mg oral tablet: 2 tab(s) orally every 6 hours  Advair Diskus 250 mcg-50 mcg inhalation powder: 1 puff(s) inhaled 2 times a day  Colace 100 mg oral capsule: 1 cap(s) orally 2 times a day as needed for - MDD:2 capsules  ibuprofen 600 mg oral tablet: 1 tab(s) orally every 6 hours  omeprazole 40 mg oral delayed release capsule: 1 cap(s) orally   acetaminophen 500 mg oral tablet: 2 tab(s) orally every 6 hours  Advair Diskus 250 mcg-50 mcg inhalation powder: 1 puff(s) inhaled 2 times a day  Colace 100 mg oral capsule: 1 cap(s) orally 2 times a day as needed for - MDD:2 capsules  ibuprofen 600 mg oral tablet: 1 tab(s) orally every 6 hours  omeprazole 40 mg oral delayed release capsule: 1 cap(s) orally  oxyCODONE 5 mg oral tablet: 1 tab(s) orally every 6 hours as needed for  severe pain MDD: 4

## 2023-11-30 NOTE — DISCHARGE NOTE PROVIDER - CARE PROVIDERS DIRECT ADDRESSES
[email or web address]

## 2023-11-30 NOTE — DISCHARGE NOTE PROVIDER - NSDCCPTREATMENT_GEN_ALL_CORE_FT
PRINCIPAL PROCEDURE  Procedure: Open repair of inguinal hernia using mesh in adult  Findings and Treatment: 11/30/23       PRINCIPAL PROCEDURE  Procedure: Small bowel resection with anastomosis  Findings and Treatment:       SECONDARY PROCEDURE  Procedure: Laparoscopic enterolysis  Findings and Treatment:

## 2023-11-30 NOTE — DISCHARGE NOTE PROVIDER - NSDCCPCAREPLAN_GEN_ALL_CORE_FT
PRINCIPAL DISCHARGE DIAGNOSIS  Diagnosis: SBO (small bowel obstruction)  Assessment and Plan of Treatment:      PRINCIPAL DISCHARGE DIAGNOSIS  Diagnosis: SBO (small bowel obstruction)  Assessment and Plan of Treatment: Initially presenting with SBO, hx of multiple bouts of SBO following open ventral hernia repair with mesh >15 years ago. On this admission initially managed with NGT, followed wt elective surgery with JENNIFER, SBR and primary anastomosis. To follow up with Dr. Lord as an outpatient.      SECONDARY DISCHARGE DIAGNOSES  Diagnosis: HLD (hyperlipidemia)  Assessment and Plan of Treatment: Follow up outpatient with primary medicine doctor for lipid panel and evaluation for continuation of statin.       Diagnosis: HTN (hypertension)  Assessment and Plan of Treatment: Slightly elevated BPs while inpatient, follow up with primary medicine doctor for evaluation.    Diagnosis: Asthma  Assessment and Plan of Treatment: Continue home medication regimen. Continue follow up care with pulmonologist.    Diagnosis: Thomson esophagus  Assessment and Plan of Treatment: Continue home medication regimen w/ PPI, and follow up with GI.    Diagnosis: Cancer of skin  Assessment and Plan of Treatment: Continue follow up care as scheduled with [facility].    Diagnosis: Sleep apnea, obstructive  Assessment and Plan of Treatment: Follow up with outpatient primary medical provider for evaluation if needed.     PRINCIPAL DISCHARGE DIAGNOSIS  Diagnosis: SBO (small bowel obstruction)  Assessment and Plan of Treatment: Initially presenting with SBO, hx of multiple bouts of SBO following open ventral hernia repair with mesh >15 years ago. On this admission initially managed with NGT, followed wt elective surgery with JENNIFER, SBR and primary anastomosis. To follow up with Dr. Lord as an outpatient.      SECONDARY DISCHARGE DIAGNOSES  Diagnosis: HLD (hyperlipidemia)  Assessment and Plan of Treatment: Follow up outpatient with primary medicine doctor for lipid panel and evaluation for continuation of statin.       Diagnosis: HTN (hypertension)  Assessment and Plan of Treatment: Slightly elevated BPs while inpatient, follow up with primary medicine doctor for evaluation.    Diagnosis: Asthma  Assessment and Plan of Treatment: Continue home medication regimen. Continue follow up care with pulmonologist.    Diagnosis: Thomson esophagus  Assessment and Plan of Treatment: Continue home medication regimen w/ PPI, and follow up with GI.    Diagnosis: Cancer of skin  Assessment and Plan of Treatment: Continue follow up care as scheduled with [facility] [place].    Diagnosis: Sleep apnea, obstructive  Assessment and Plan of Treatment: Follow up with outpatient primary medical provider for evaluation if needed.     PRINCIPAL DISCHARGE DIAGNOSIS  Diagnosis: SBO (small bowel obstruction)  Assessment and Plan of Treatment: Initially presenting with SBO, hx of multiple bouts of SBO following open ventral hernia repair with mesh >15 years ago. On this admission initially managed with NGT, followed wt elective surgery with JENNIFER, SBR and primary anastomosis. To follow up with Dr. Lord as an outpatient.      SECONDARY DISCHARGE DIAGNOSES  Diagnosis: HLD (hyperlipidemia)  Assessment and Plan of Treatment: Follow up outpatient with primary medicine doctor for lipid panel and evaluation for continuation of statin.       Diagnosis: HTN (hypertension)  Assessment and Plan of Treatment: Slightly elevated BPs while inpatient, follow up with primary medicine doctor for evaluation.    Diagnosis: Asthma  Assessment and Plan of Treatment: Continue home medication regimen. Continue follow up care with pulmonologist.    Diagnosis: Thomson esophagus  Assessment and Plan of Treatment: Continue home medication regimen w/ PPI, and follow up with GI.    Diagnosis: Cancer of skin  Assessment and Plan of Treatment: Continue follow up care as scheduled with [facility] [place].    Diagnosis: Sleep apnea, obstructive  Assessment and Plan of Treatment: Follow up with outpatient primary medical provider for evaluation if needed.

## 2023-11-30 NOTE — PATIENT PROFILE ADULT - FALL HARM RISK - HARM RISK INTERVENTIONS

## 2023-12-01 PROCEDURE — 99232 SBSQ HOSP IP/OBS MODERATE 35: CPT

## 2023-12-01 RX ORDER — SODIUM CHLORIDE 9 MG/ML
1000 INJECTION, SOLUTION INTRAVENOUS
Refills: 0 | Status: DISCONTINUED | OUTPATIENT
Start: 2023-12-01 | End: 2023-12-04

## 2023-12-01 RX ADMIN — SODIUM CHLORIDE 125 MILLILITER(S): 9 INJECTION, SOLUTION INTRAVENOUS at 00:20

## 2023-12-01 RX ADMIN — SODIUM CHLORIDE 75 MILLILITER(S): 9 INJECTION, SOLUTION INTRAVENOUS at 08:27

## 2023-12-01 RX ADMIN — PANTOPRAZOLE SODIUM 40 MILLIGRAM(S): 20 TABLET, DELAYED RELEASE ORAL at 12:00

## 2023-12-01 NOTE — PROGRESS NOTE ADULT - ASSESSMENT
Impression: 63y Male admitted with Intestinal obstruction. Patient's pain is well controlled with medications. NG tube removed 11/30. Tolerating clear liquids. On exam, patient nontender, without signs of peritonitis      PMH Hyperlipidemia    Sleep apnea    Asthma    History of Thomson's esophagus    GERD (gastroesophageal reflux disease)    Arthritis    H/O squamous cell carcinoma excision    H/O melanoma excision        Plan:  - Plan for OR Monday 12/4, Lysis of adhesions  - ADAT, currently on CLD  -continue VTE prophylaxis   -Increase activity with OOB, Ambulate  -Patient instructed on and encouraged incentive spirometry use  -f/u AM labs    Patient discussed with chief resident physician Dr. Woods, will discuss with attending physician surgeon Dr. Lord Impression: 63y Male admitted with Intestinal obstruction. Patient's pain is well controlled with medications. NG tube removed 11/30. Tolerating clear liquids. On exam, patient nontender, without signs of peritonitis      PMH Hyperlipidemia    Sleep apnea    Asthma    History of Thomson's esophagus    GERD (gastroesophageal reflux disease)    Arthritis    H/O squamous cell carcinoma excision  Surg. Att.  Pt. is fine. He tolerates diet- clears, which will be advanced.  He is  on the schedule for enterolysis and possible adjustment of the mesh    H/O melanoma excision        Plan:  - Plan for OR Monday 12/4, Lysis of adhesions  - ADAT, currently on CLD  -continue VTE prophylaxis   -Increase activity with OOB, Ambulate  -Patient instructed on and encouraged incentive spirometry use  -f/u AM labs    Patient discussed with chief resident physician Dr. Woods, will discuss with attending physician surgeon Dr. Lord

## 2023-12-01 NOTE — PROGRESS NOTE ADULT - SUBJECTIVE AND OBJECTIVE BOX
Postoperative Day #: ****    63y Male admitted with Intestinal obstruction    .    Patient seen and examined bedside resting comfortably.  No complaints offered.   Abdominal pain is well controlled.  Denies nausea and vomiting. Tolerating diet.  No flatus/BM.   Denies chest pain, dyspnea, cough.    T(F): 97.9 (12-01-23 @ 06:11), Max: 98.3 (11-30-23 @ 19:55)  HR: 68 (12-01-23 @ 06:11) (68 - 78)  BP: 149/73 (12-01-23 @ 06:11) (125/71 - 149/73)  RR: 18 (12-01-23 @ 06:11) (18 - 19)  SpO2: 96% (12-01-23 @ 06:11) (93% - 96%)  Wt(kg): --  CAPILLARY BLOOD GLUCOSE          PHYSICAL EXAM:  General: NAD, WDWN.   Neuro:  Alert & oriented x 3  HEENT: NCAT, EOMI, conjunctiva clear  CV: +S1+S2 regular rate and rhythm  Lung: clear to ausculation bilaterally  Abdomen: soft, NTND. Normactive BS  Extremities: no pedal edema or calf tenderness noted   :     LABS:                        15.3   9.69  )-----------( 187      ( 30 Nov 2023 08:20 )             44.5     11-30    136  |  104  |  14  ----------------------------<  101<H>  4.0   |  27  |  0.86    Ca    8.8      30 Nov 2023 08:20  Phos  3.4     11-30  Mg     2.1     11-30    TPro  7.3  /  Alb  3.7  /  TBili  0.9  /  DBili  x   /  AST  12<L>  /  ALT  20  /  AlkPhos  59  11-29    PT/INR - ( 29 Nov 2023 13:08 )   PT: 10.3 sec;   INR: 0.88 ratio         PTT - ( 29 Nov 2023 13:08 )  PTT:31.6 sec  I&O's Detail    30 Nov 2023 07:01  -  01 Dec 2023 07:00  --------------------------------------------------------  IN:    IV PiggyBack: 20 mL    Lactated Ringers: 3000 mL    Oral Fluid: 600 mL  Total IN: 3620 mL    OUT:    Nasogastric/Oral tube (mL): 200 mL    Voided (mL): 1200 mL  Total OUT: 1400 mL    Total NET: 2220 mL      01 Dec 2023 07:01  -  01 Dec 2023 10:14  --------------------------------------------------------  IN:  Total IN: 0 mL    OUT:    Voided (mL): 200 mL  Total OUT: 200 mL    Total NET: -200 mL          Impression: 63y Male admitted with Intestinal obstruction      PMH Hyperlipidemia    Sleep apnea    Asthma    History of Thomson's esophagus    GERD (gastroesophageal reflux disease)    Arthritis    H/O squamous cell carcinoma excision    H/O melanoma excision        Plan:  -continue VTE prophylaxis   -Increase activity with PT, OOB, Ambulate  -Patient instructed on and encouraged incentive spirometry use  -continue local wound care  -f/u AM labs  -will discuss with Admission date: 11/29    63y Male with a PMHx Hyperlipidemia, Sleep apnea, Asthma, History of Thomson's esophagus, GERD (gastroesophageal reflux disease), Arthritis, H/O squamous cell carcinoma excision, H/O melanoma excision of open umbilical hernia repair with mesh with history of SBO, admitted with Intestinal obstruction      Patient seen and examined bedside resting comfortably.  No new complaints  NG tube removed yesterday 11/30  Abdominal pain is well controlled with medications  Denies nausea and vomiting. Tolerating clear liquid diet. Still on IV fluids at a rate of 125  No flatus/BM.   Denies chest pain, dyspnea, cough.    T(F): 97.9 (12-01-23 @ 06:11), Max: 98.3 (11-30-23 @ 19:55)  HR: 68 (12-01-23 @ 06:11) (68 - 78)  BP: 149/73 (12-01-23 @ 06:11) (125/71 - 149/73)  RR: 18 (12-01-23 @ 06:11) (18 - 19)  SpO2: 96% (12-01-23 @ 06:11) (93% - 96%)  Wt(kg): --  CAPILLARY BLOOD GLUCOSE          PHYSICAL EXAM:  General: NAD, WDWN.   Neuro:  Alert & oriented x 3  CV: +S1+S2 regular rate and rhythm  Lung: symmetric chest rise and fall, no increased work of breathing  Abdomen: soft, NTND. no guarding, no signs of peritonitis  Extremities: no pedal edema or calf tenderness noted    LABS:                        15.3   9.69  )-----------( 187      ( 30 Nov 2023 08:20 )             44.5     11-30    136  |  104  |  14  ----------------------------<  101<H>  4.0   |  27  |  0.86    Ca    8.8      30 Nov 2023 08:20  Phos  3.4     11-30  Mg     2.1     11-30    TPro  7.3  /  Alb  3.7  /  TBili  0.9  /  DBili  x   /  AST  12<L>  /  ALT  20  /  AlkPhos  59  11-29    PT/INR - ( 29 Nov 2023 13:08 )   PT: 10.3 sec;   INR: 0.88 ratio         PTT - ( 29 Nov 2023 13:08 )  PTT:31.6 sec  I&O's Detail    30 Nov 2023 07:01  -  01 Dec 2023 07:00  --------------------------------------------------------  IN:    IV PiggyBack: 20 mL    Lactated Ringers: 3000 mL    Oral Fluid: 600 mL  Total IN: 3620 mL    OUT:    Nasogastric/Oral tube (mL): 200 mL    Voided (mL): 1200 mL  Total OUT: 1400 mL    Total NET: 2220 mL      01 Dec 2023 07:01  -  01 Dec 2023 10:14  --------------------------------------------------------  IN:  Total IN: 0 mL    OUT:    Voided (mL): 200 mL  Total OUT: 200 mL    Total NET: -200 mL

## 2023-12-02 LAB
ANION GAP SERPL CALC-SCNC: 7 MMOL/L — SIGNIFICANT CHANGE UP (ref 5–17)
ANION GAP SERPL CALC-SCNC: 7 MMOL/L — SIGNIFICANT CHANGE UP (ref 5–17)
BUN SERPL-MCNC: 7 MG/DL — SIGNIFICANT CHANGE UP (ref 7–23)
BUN SERPL-MCNC: 7 MG/DL — SIGNIFICANT CHANGE UP (ref 7–23)
CALCIUM SERPL-MCNC: 8.9 MG/DL — SIGNIFICANT CHANGE UP (ref 8.5–10.1)
CALCIUM SERPL-MCNC: 8.9 MG/DL — SIGNIFICANT CHANGE UP (ref 8.5–10.1)
CHLORIDE SERPL-SCNC: 107 MMOL/L — SIGNIFICANT CHANGE UP (ref 96–108)
CHLORIDE SERPL-SCNC: 107 MMOL/L — SIGNIFICANT CHANGE UP (ref 96–108)
CO2 SERPL-SCNC: 28 MMOL/L — SIGNIFICANT CHANGE UP (ref 22–31)
CO2 SERPL-SCNC: 28 MMOL/L — SIGNIFICANT CHANGE UP (ref 22–31)
CREAT SERPL-MCNC: 0.73 MG/DL — SIGNIFICANT CHANGE UP (ref 0.5–1.3)
CREAT SERPL-MCNC: 0.73 MG/DL — SIGNIFICANT CHANGE UP (ref 0.5–1.3)
EGFR: 101 ML/MIN/1.73M2 — SIGNIFICANT CHANGE UP
EGFR: 101 ML/MIN/1.73M2 — SIGNIFICANT CHANGE UP
GLUCOSE SERPL-MCNC: 93 MG/DL — SIGNIFICANT CHANGE UP (ref 70–99)
GLUCOSE SERPL-MCNC: 93 MG/DL — SIGNIFICANT CHANGE UP (ref 70–99)
HCT VFR BLD CALC: 45 % — SIGNIFICANT CHANGE UP (ref 39–50)
HCT VFR BLD CALC: 45 % — SIGNIFICANT CHANGE UP (ref 39–50)
HGB BLD-MCNC: 14.7 G/DL — SIGNIFICANT CHANGE UP (ref 13–17)
HGB BLD-MCNC: 14.7 G/DL — SIGNIFICANT CHANGE UP (ref 13–17)
MAGNESIUM SERPL-MCNC: 2 MG/DL — SIGNIFICANT CHANGE UP (ref 1.6–2.6)
MAGNESIUM SERPL-MCNC: 2 MG/DL — SIGNIFICANT CHANGE UP (ref 1.6–2.6)
MCHC RBC-ENTMCNC: 31.3 PG — SIGNIFICANT CHANGE UP (ref 27–34)
MCHC RBC-ENTMCNC: 31.3 PG — SIGNIFICANT CHANGE UP (ref 27–34)
MCHC RBC-ENTMCNC: 32.7 GM/DL — SIGNIFICANT CHANGE UP (ref 32–36)
MCHC RBC-ENTMCNC: 32.7 GM/DL — SIGNIFICANT CHANGE UP (ref 32–36)
MCV RBC AUTO: 95.9 FL — SIGNIFICANT CHANGE UP (ref 80–100)
MCV RBC AUTO: 95.9 FL — SIGNIFICANT CHANGE UP (ref 80–100)
NRBC # BLD: 0 /100 WBCS — SIGNIFICANT CHANGE UP (ref 0–0)
NRBC # BLD: 0 /100 WBCS — SIGNIFICANT CHANGE UP (ref 0–0)
PHOSPHATE SERPL-MCNC: 3.5 MG/DL — SIGNIFICANT CHANGE UP (ref 2.5–4.5)
PHOSPHATE SERPL-MCNC: 3.5 MG/DL — SIGNIFICANT CHANGE UP (ref 2.5–4.5)
PLATELET # BLD AUTO: 192 K/UL — SIGNIFICANT CHANGE UP (ref 150–400)
PLATELET # BLD AUTO: 192 K/UL — SIGNIFICANT CHANGE UP (ref 150–400)
POTASSIUM SERPL-MCNC: 3.9 MMOL/L — SIGNIFICANT CHANGE UP (ref 3.5–5.3)
POTASSIUM SERPL-MCNC: 3.9 MMOL/L — SIGNIFICANT CHANGE UP (ref 3.5–5.3)
POTASSIUM SERPL-SCNC: 3.9 MMOL/L — SIGNIFICANT CHANGE UP (ref 3.5–5.3)
POTASSIUM SERPL-SCNC: 3.9 MMOL/L — SIGNIFICANT CHANGE UP (ref 3.5–5.3)
RBC # BLD: 4.69 M/UL — SIGNIFICANT CHANGE UP (ref 4.2–5.8)
RBC # BLD: 4.69 M/UL — SIGNIFICANT CHANGE UP (ref 4.2–5.8)
RBC # FLD: 12.4 % — SIGNIFICANT CHANGE UP (ref 10.3–14.5)
RBC # FLD: 12.4 % — SIGNIFICANT CHANGE UP (ref 10.3–14.5)
SODIUM SERPL-SCNC: 142 MMOL/L — SIGNIFICANT CHANGE UP (ref 135–145)
SODIUM SERPL-SCNC: 142 MMOL/L — SIGNIFICANT CHANGE UP (ref 135–145)
WBC # BLD: 6.59 K/UL — SIGNIFICANT CHANGE UP (ref 3.8–10.5)
WBC # BLD: 6.59 K/UL — SIGNIFICANT CHANGE UP (ref 3.8–10.5)
WBC # FLD AUTO: 6.59 K/UL — SIGNIFICANT CHANGE UP (ref 3.8–10.5)
WBC # FLD AUTO: 6.59 K/UL — SIGNIFICANT CHANGE UP (ref 3.8–10.5)

## 2023-12-02 PROCEDURE — 99231 SBSQ HOSP IP/OBS SF/LOW 25: CPT

## 2023-12-02 RX ORDER — HEPARIN SODIUM 5000 [USP'U]/ML
5000 INJECTION INTRAVENOUS; SUBCUTANEOUS EVERY 8 HOURS
Refills: 0 | Status: COMPLETED | OUTPATIENT
Start: 2023-12-02 | End: 2023-12-03

## 2023-12-02 RX ADMIN — HEPARIN SODIUM 5000 UNIT(S): 5000 INJECTION INTRAVENOUS; SUBCUTANEOUS at 21:05

## 2023-12-02 RX ADMIN — PANTOPRAZOLE SODIUM 40 MILLIGRAM(S): 20 TABLET, DELAYED RELEASE ORAL at 11:27

## 2023-12-02 RX ADMIN — HEPARIN SODIUM 5000 UNIT(S): 5000 INJECTION INTRAVENOUS; SUBCUTANEOUS at 05:47

## 2023-12-02 RX ADMIN — HEPARIN SODIUM 5000 UNIT(S): 5000 INJECTION INTRAVENOUS; SUBCUTANEOUS at 13:37

## 2023-12-02 NOTE — PROGRESS NOTE ADULT - SUBJECTIVE AND OBJECTIVE BOX
covering for Dr. Lord  pt seen  doing well  tolerating fulls  +F+BM    ICU Vital Signs Last 24 Hrs  T(C): 36.6 (02 Dec 2023 11:13), Max: 36.8 (01 Dec 2023 13:35)  T(F): 97.9 (02 Dec 2023 11:13), Max: 98.2 (01 Dec 2023 13:35)  HR: 67 (02 Dec 2023 11:13) (60 - 68)  BP: 145/85 (02 Dec 2023 11:13) (124/72 - 146/81)  BP(mean): --  ABP: --  ABP(mean): --  RR: 18 (02 Dec 2023 11:13) (18 - 19)  SpO2: 96% (02 Dec 2023 11:13) (94% - 97%)    O2 Parameters below as of 02 Dec 2023 11:13  Patient On (Oxygen Delivery Method): room air        gen-NAD  resp-clear  abd-soft NT.ND                          14.7   6.59  )-----------( 192      ( 02 Dec 2023 07:16 )             45.0

## 2023-12-03 ENCOUNTER — TRANSCRIPTION ENCOUNTER (OUTPATIENT)
Age: 63
End: 2023-12-03

## 2023-12-03 DIAGNOSIS — Z29.9 ENCOUNTER FOR PROPHYLACTIC MEASURES, UNSPECIFIED: ICD-10-CM

## 2023-12-03 DIAGNOSIS — Z01.818 ENCOUNTER FOR OTHER PREPROCEDURAL EXAMINATION: ICD-10-CM

## 2023-12-03 DIAGNOSIS — K22.70 BARRETT'S ESOPHAGUS WITHOUT DYSPLASIA: ICD-10-CM

## 2023-12-03 DIAGNOSIS — C44.90 UNSPECIFIED MALIGNANT NEOPLASM OF SKIN, UNSPECIFIED: ICD-10-CM

## 2023-12-03 DIAGNOSIS — E78.5 HYPERLIPIDEMIA, UNSPECIFIED: ICD-10-CM

## 2023-12-03 DIAGNOSIS — G47.30 SLEEP APNEA, UNSPECIFIED: ICD-10-CM

## 2023-12-03 DIAGNOSIS — I10 ESSENTIAL (PRIMARY) HYPERTENSION: ICD-10-CM

## 2023-12-03 DIAGNOSIS — J45.909 UNSPECIFIED ASTHMA, UNCOMPLICATED: ICD-10-CM

## 2023-12-03 LAB
ANION GAP SERPL CALC-SCNC: 5 MMOL/L — SIGNIFICANT CHANGE UP (ref 5–17)
ANION GAP SERPL CALC-SCNC: 5 MMOL/L — SIGNIFICANT CHANGE UP (ref 5–17)
APTT BLD: 34.8 SEC — SIGNIFICANT CHANGE UP (ref 24.5–35.6)
APTT BLD: 34.8 SEC — SIGNIFICANT CHANGE UP (ref 24.5–35.6)
BLD GP AB SCN SERPL QL: SIGNIFICANT CHANGE UP
BLD GP AB SCN SERPL QL: SIGNIFICANT CHANGE UP
BUN SERPL-MCNC: 6 MG/DL — LOW (ref 7–23)
BUN SERPL-MCNC: 6 MG/DL — LOW (ref 7–23)
CALCIUM SERPL-MCNC: 9.3 MG/DL — SIGNIFICANT CHANGE UP (ref 8.5–10.1)
CALCIUM SERPL-MCNC: 9.3 MG/DL — SIGNIFICANT CHANGE UP (ref 8.5–10.1)
CHLORIDE SERPL-SCNC: 103 MMOL/L — SIGNIFICANT CHANGE UP (ref 96–108)
CHLORIDE SERPL-SCNC: 103 MMOL/L — SIGNIFICANT CHANGE UP (ref 96–108)
CO2 SERPL-SCNC: 34 MMOL/L — HIGH (ref 22–31)
CO2 SERPL-SCNC: 34 MMOL/L — HIGH (ref 22–31)
CREAT SERPL-MCNC: 0.76 MG/DL — SIGNIFICANT CHANGE UP (ref 0.5–1.3)
CREAT SERPL-MCNC: 0.76 MG/DL — SIGNIFICANT CHANGE UP (ref 0.5–1.3)
EGFR: 100 ML/MIN/1.73M2 — SIGNIFICANT CHANGE UP
EGFR: 100 ML/MIN/1.73M2 — SIGNIFICANT CHANGE UP
GLUCOSE SERPL-MCNC: 96 MG/DL — SIGNIFICANT CHANGE UP (ref 70–99)
GLUCOSE SERPL-MCNC: 96 MG/DL — SIGNIFICANT CHANGE UP (ref 70–99)
HCT VFR BLD CALC: 44.5 % — SIGNIFICANT CHANGE UP (ref 39–50)
HCT VFR BLD CALC: 44.5 % — SIGNIFICANT CHANGE UP (ref 39–50)
HGB BLD-MCNC: 14.5 G/DL — SIGNIFICANT CHANGE UP (ref 13–17)
HGB BLD-MCNC: 14.5 G/DL — SIGNIFICANT CHANGE UP (ref 13–17)
INR BLD: 0.95 RATIO — SIGNIFICANT CHANGE UP (ref 0.85–1.18)
INR BLD: 0.95 RATIO — SIGNIFICANT CHANGE UP (ref 0.85–1.18)
MCHC RBC-ENTMCNC: 31.2 PG — SIGNIFICANT CHANGE UP (ref 27–34)
MCHC RBC-ENTMCNC: 31.2 PG — SIGNIFICANT CHANGE UP (ref 27–34)
MCHC RBC-ENTMCNC: 32.6 GM/DL — SIGNIFICANT CHANGE UP (ref 32–36)
MCHC RBC-ENTMCNC: 32.6 GM/DL — SIGNIFICANT CHANGE UP (ref 32–36)
MCV RBC AUTO: 95.7 FL — SIGNIFICANT CHANGE UP (ref 80–100)
MCV RBC AUTO: 95.7 FL — SIGNIFICANT CHANGE UP (ref 80–100)
NRBC # BLD: 0 /100 WBCS — SIGNIFICANT CHANGE UP (ref 0–0)
NRBC # BLD: 0 /100 WBCS — SIGNIFICANT CHANGE UP (ref 0–0)
PLATELET # BLD AUTO: 211 K/UL — SIGNIFICANT CHANGE UP (ref 150–400)
PLATELET # BLD AUTO: 211 K/UL — SIGNIFICANT CHANGE UP (ref 150–400)
POTASSIUM SERPL-MCNC: 3.9 MMOL/L — SIGNIFICANT CHANGE UP (ref 3.5–5.3)
POTASSIUM SERPL-MCNC: 3.9 MMOL/L — SIGNIFICANT CHANGE UP (ref 3.5–5.3)
POTASSIUM SERPL-SCNC: 3.9 MMOL/L — SIGNIFICANT CHANGE UP (ref 3.5–5.3)
POTASSIUM SERPL-SCNC: 3.9 MMOL/L — SIGNIFICANT CHANGE UP (ref 3.5–5.3)
PROTHROM AB SERPL-ACNC: 11.1 SEC — SIGNIFICANT CHANGE UP (ref 9.5–13)
PROTHROM AB SERPL-ACNC: 11.1 SEC — SIGNIFICANT CHANGE UP (ref 9.5–13)
RBC # BLD: 4.65 M/UL — SIGNIFICANT CHANGE UP (ref 4.2–5.8)
RBC # BLD: 4.65 M/UL — SIGNIFICANT CHANGE UP (ref 4.2–5.8)
RBC # FLD: 12.2 % — SIGNIFICANT CHANGE UP (ref 10.3–14.5)
RBC # FLD: 12.2 % — SIGNIFICANT CHANGE UP (ref 10.3–14.5)
SODIUM SERPL-SCNC: 142 MMOL/L — SIGNIFICANT CHANGE UP (ref 135–145)
SODIUM SERPL-SCNC: 142 MMOL/L — SIGNIFICANT CHANGE UP (ref 135–145)
WBC # BLD: 6 K/UL — SIGNIFICANT CHANGE UP (ref 3.8–10.5)
WBC # BLD: 6 K/UL — SIGNIFICANT CHANGE UP (ref 3.8–10.5)
WBC # FLD AUTO: 6 K/UL — SIGNIFICANT CHANGE UP (ref 3.8–10.5)
WBC # FLD AUTO: 6 K/UL — SIGNIFICANT CHANGE UP (ref 3.8–10.5)

## 2023-12-03 PROCEDURE — 71045 X-RAY EXAM CHEST 1 VIEW: CPT | Mod: 26

## 2023-12-03 PROCEDURE — 93010 ELECTROCARDIOGRAM REPORT: CPT

## 2023-12-03 PROCEDURE — 99223 1ST HOSP IP/OBS HIGH 75: CPT

## 2023-12-03 RX ORDER — ATORVASTATIN CALCIUM 80 MG/1
1 TABLET, FILM COATED ORAL
Qty: 0 | Refills: 0 | DISCHARGE

## 2023-12-03 RX ORDER — CEFAZOLIN SODIUM 1 G
2000 VIAL (EA) INJECTION ONCE
Refills: 0 | Status: COMPLETED | OUTPATIENT
Start: 2023-12-04 | End: 2023-12-04

## 2023-12-03 RX ORDER — OMEPRAZOLE 10 MG/1
1 CAPSULE, DELAYED RELEASE ORAL
Qty: 0 | Refills: 0 | DISCHARGE

## 2023-12-03 RX ADMIN — HEPARIN SODIUM 5000 UNIT(S): 5000 INJECTION INTRAVENOUS; SUBCUTANEOUS at 05:15

## 2023-12-03 RX ADMIN — HEPARIN SODIUM 5000 UNIT(S): 5000 INJECTION INTRAVENOUS; SUBCUTANEOUS at 13:51

## 2023-12-03 RX ADMIN — PANTOPRAZOLE SODIUM 40 MILLIGRAM(S): 20 TABLET, DELAYED RELEASE ORAL at 11:48

## 2023-12-03 RX ADMIN — SODIUM CHLORIDE 75 MILLILITER(S): 9 INJECTION, SOLUTION INTRAVENOUS at 11:48

## 2023-12-03 NOTE — CONSULT NOTE ADULT - PROBLEM SELECTOR RECOMMENDATION 4
- follows with [facility] outpatient for yearly colonoscopy/endoscopies  - PPI daily  - c/w PPI   - follow outpatient - follows with [facility] outpatient for yearly colonoscopy/endoscopies  - PPI daily  - c/w PPI   - follow outpatient - follows with [facility] outpatient for yearly colonoscopy/endoscopies  - PPI daily  - c/w PPI   - follow outpatient - follows with [facility] outpatient for yearly colonoscopy/endoscopies (developed 2/2 9/11 exposure)  - 40mg omeprazole daily  - c/w PPI   - follow outpatient - follows with [facility] outpatient for yearly colonoscopy/endoscopies (developed 2/2 9/11 exposure)  - 40mg omeprazole daily  - c/w PPI   - follow outpatient - follows with [facility] outpatient for yearly colonoscopy/endoscopies (developed 2/2 9/11 exposure)  - 40mg omeprazole daily  - c/w PPI   - follow outpatient - chronic, does not take statin (stopped atorvastatin 20 about 9months ago)  - f/u outpatient for lipid panel and continuation of statin

## 2023-12-03 NOTE — CONSULT NOTE ADULT - PROBLEM SELECTOR RECOMMENDATION 7
- DVT ppx per surgery - h/o melanomas and SCCs, follows at [facility]  - planned for melanoma resection on Friday - h/o melanomas and SCCs, follows at [facility]  - planned for melanoma resection on Friday - h/o melanomas and SCCs, follows at [facility]  - planned for melanoma resection on Friday

## 2023-12-03 NOTE — CONSULT NOTE ADULT - PROBLEM SELECTOR RECOMMENDATION 2
- chronic  - c/w daily statin  - f/u outpatient - chronic, does not take statin (stopped atorvastatin 20 about 9months ago)  - f/u outpatient - Patient is medically cleared for surgery on 12/4 pending repeat routine chest xr and EKG  - METS >4, denied any cardiac history  - Monitor resp status 2/2 asthma - Patient is medically cleared for surgery on 12/4 pending repeat routine chest xr and EKG  - METS >4, denied any cardiac history  - Monitor resp status 2/2 asthma. Spo2 appropriate on RA

## 2023-12-03 NOTE — PROGRESS NOTE ADULT - SUBJECTIVE AND OBJECTIVE BOX
pt seen  doing well  no issues  ICU Vital Signs Last 24 Hrs  T(C): 36.6 (03 Dec 2023 04:18), Max: 36.6 (02 Dec 2023 11:13)  T(F): 97.8 (03 Dec 2023 04:18), Max: 97.9 (02 Dec 2023 11:13)  HR: 61 (03 Dec 2023 04:18) (61 - 67)  BP: 166/86 (03 Dec 2023 04:18) (145/85 - 166/86)  BP(mean): --  ABP: --  ABP(mean): --  RR: 18 (03 Dec 2023 04:18) (18 - 18)  SpO2: 97% (03 Dec 2023 04:18) (96% - 97%)    O2 Parameters below as of 03 Dec 2023 04:18  Patient On (Oxygen Delivery Method): room air        gen-NAD  resp-clear  abd-soft ND/NT                          14.5   6.00  )-----------( 211      ( 03 Dec 2023 06:40 )             44.5

## 2023-12-03 NOTE — CONSULT NOTE ADULT - TIME BILLING
Reviewing chart notes and data, reviewing telemetry monitor records, face to face time counseling the patient  Pt seen and examined. Case discussed with resident. Agree with assessment and plan above (edited by me in detail)

## 2023-12-03 NOTE — CONSULT NOTE ADULT - PROBLEM SELECTOR RECOMMENDATION 3
- new diagnosis after 9/11  - has inhaler and uses it rarely   - follows closely with pulm outpatient  - very physically active at home with no complaints or SOB - new diagnosis after 9/11  - has inhaler and uses it rarely (1x/month when aggravated by pollutant)  - follows closely with pulm outpatient  - very physically active at home with no complaints or SOB - patient denies any history of HTN  - BPs have been elevated here   - consider amlodipine 5mg qD outpatient  - no sx, not a barrier to surgery at this time - patient denies any history of HTN  - BPs have been elevated here   - consider amlodipine 5mg qD if persists   - Monitor vitals

## 2023-12-03 NOTE — CONSULT NOTE ADULT - ATTENDING COMMENTS
Patient is a 63y old  Male with PMH HLD. sleep apnea, asthma, Barretts esophagus, SCCs and melanomas who presents with a chief complaint of SBO  Hospitalist consult for medical clearance for OR 12/4 - Dislodged mesh with recurrent obstruction.   Medically optimized for planned procedure. Monitor vitals closely - may need to start Amlodipine if BP persistently elevated.

## 2023-12-03 NOTE — CONSULT NOTE ADULT - PROBLEM SELECTOR RECOMMENDATION 6
- since significant weight loss this year, he is mostly asx and uses BIPAP rarely   - BPs have been elevated here, he states likely 2/2 anxiety before procedure  - O2 sats within NL  - consider encouragement of BIPAP if blood pressures remain elevated - follows with [facility] outpatient for yearly colonoscopy/endoscopies (developed 2/2 9/11 exposure)  - 40mg omeprazole daily  - c/w PPI   - follow outpatient - follows with [facility] outpatient for yearly colonoscopy/endoscopies (developed 2/2 9/11 exposure)  - 40mg omeprazole daily  - c/w PPI   - follow outpatient - follows with [facility] outpatient for yearly colonoscopy/endoscopies (developed 2/2 9/11 exposure)  - 40mg omeprazole daily  - c/w PPI   - follow outpatient - follows with [facility] outpatient for yearly colonoscopy/endoscopies (developed 2/2 9/11 exposure)  - continue 40mg omeprazole daily - follows with [facility] outpatient for yearly colonoscopy/endoscopies (developed 2/2 9/11 exposure)  - continue 40mg omeprazole daily - follows with [facility] outpatient for yearly colonoscopy/endoscopies (developed 2/2 9/11 exposure)  - continue 40mg omeprazole daily

## 2023-12-03 NOTE — CONSULT NOTE ADULT - PROBLEM SELECTOR RECOMMENDATION 5
- h/o melanomas and SCCs, follows at [facility]  - planned for melanoma resection on Friday - h/o melanomas and SCCs, follows at [facility]  - planned for melanoma resection on Friday - h/o melanomas and SCCs, follows at [facility]  - planned for melanoma resection on Friday - new diagnosis after 9/11  - has inhaler and uses it rarely (1x/month when aggravated by pollutant)  - follows closely with pulm outpatient  - very physically active at home with no complaints or SOB - new diagnosis after 9/11  - has inhaler and uses it rarely (1x/month when aggravated by pollutant)  - follows closely with pulm outpatient  - physically active at home with no complaints or SOB

## 2023-12-03 NOTE — CONSULT NOTE ADULT - SUBJECTIVE AND OBJECTIVE BOX
Patient is a 63y old  Male with PMH HLD. sleep apnea, asthma, Barretts esophagus, SCCs and melanomas who presents with a chief complaint of SBO (02 Dec 2023 12:38).     HPI:  Patient is a 63y old  Male with PMH HLD. sleep apnea, asthma, Barretts esophagus, SCCs and melanomas who presents with a chief complaint of SBO. Patient has a history of multiple SBOs in the past s/p umbilical hernia repair about 15 years ago with mesh (mesh type was recalled). On  AM, patient started having dark brown vomiting with no other accompanying sx.   Patient denied fever, chills, SOB, chest pain, diarrhea. Each time he gets SBO, he has this same presentation and is treated with NG tube.  Here he was treated with NG tube. NG tube was removed on  with no complications. He was admitted to the surgery team to plan for enterolysis. Of note, patient is a former [employer]  who worked at the site of [facility] after . Receives regular physicals and pulm follow up through [facility] services.      PAST MEDICAL & SURGICAL HISTORY:  Hyperlipidemia      Sleep apnea  CPAP      Asthma  [facility] responder--followed by [facility] monitoring center      History of Thomson's esophagus      GERD (gastroesophageal reflux disease)      Arthritis      H/O squamous cell carcinoma excision  left hand      H/O melanoma excision  Left thigh      S/P lumbar fusion  L3-L4  ---      S/P inguinal hernia repair  right--      H/O umbilical hernia repair        S/P total knee replacement, right  2018      H/O cardiac catheterization  7-8 yr ago---normal          REVIEW OF SYSTEMS:  CONSTITUTIONAL: No fever,No chills,No weakness,No weight loss, or fatigue  EYES: No eye pain, visual disturbances, or discharge  ENMT:  No ear pain,No tinnitus,No vertigo; No sinus or throat pain  NECK: No pain or stiffness  RESPIRATORY: No cough, wheezing, hemoptysis; No shortness of breath  CARDIOVASCULAR: No chest pain, No palpitations,  GASTROINTESTINAL: No abdominal  pain. No nausea, Vomiting resolved, No hematemesis; No diarrhea or constipation. No melena,No BRBPR  GENITOURINARY: No dysuria,No Urgency, No frequency, No hematuria, No incontinence  NEUROLOGICAL: No headaches,No numbness,No tingling,NO Dizziness, No tremors  SKIN: No itching, burning, rashes, or lesions   PSYCHIATRIC: No depression, anxiety, mood swings, or difficulty sleeping  Rest Of ROS-NORMAL-( )  Unable to obtain ROS 2 to -        MEDICATIONS  (STANDING):  heparin   Injectable 5000 Unit(s) SubCutaneous every 8 hours  influenza  Vaccine (HIGH DOSE) 0.7 milliLiter(s) IntraMuscular once  lactated ringers. 1000 milliLiter(s) (75 mL/Hr) IV Continuous <Continuous>  pantoprazole  Injectable 40 milliGRAM(s) IV Push daily    MEDICATIONS  (PRN):  albuterol    90 MICROgram(s) HFA Inhaler 1 Puff(s) Inhalation every 6 hours PRN Shortness of Breath and/or Wheezing      Allergies    No Known Allergies    Intolerances        SOCIAL HISTORY:    FAMILY HISTORY:  Family history of hyperlipidemia (Mother)        Vital Signs Last 24 Hrs  T(C): 36.6 (03 Dec 2023 04:18), Max: 36.6 (02 Dec 2023 11:13)  T(F): 97.8 (03 Dec 2023 04:18), Max: 97.9 (02 Dec 2023 11:13)  HR: 61 (03 Dec 2023 04:18) (61 - 67)  BP: 166/86 (03 Dec 2023 04:18) (145/85 - 166/86)  BP(mean): --  RR: 18 (03 Dec 2023 04:18) (18 - 18)  SpO2: 97% (03 Dec 2023 04:18) (96% - 97%)    Parameters below as of 03 Dec 2023 04:18  Patient On (Oxygen Delivery Method): room air        PHYSICAL EXAM:  GENERAL:  [ x]NAD , [x ] well appearing, [ ] Agitated, [ ] Lethargy, [ ] confused   HEAD:  [x ] Normal, [ ] Other  EYES:  [ x] EOMI, [x ] PERRLA, [ x] conjunctiva and sclera clear normal, [ ] Other  ENMT:  [ x] Normal, [x ] Moist mucous membranes, [ ] Good dentition, [ ] No lesions  NERVOUS SYSTEM:  [x ] Alert & Oriented X3, [ ]Confusion [ ] Nonfocal [ ] Encephalopathic [ ] Sedated [ ] Other-  CHEST/LUNG:  [x] Clear to auscultation bilaterally,[ x] No rales,[x ] No rhonchi [x ]  No wheezing  HEART:  [x]Regular rate and rhythm [ ] irregular [x] No murmurs, rubs, or gallops, [ ] PPM in place (Mfr:  )  ABDOMEN:  [x ] Soft, [ x] mildly TTP, [x ] mildly distended; [ ]No mass, [x ] Bowel sounds present, [ ] obese  EXTREMITIES: [ ] 2+ Peripheral Pulses, No clubbing, cyanosis,  [x] edema  LYMPH: No lymphadenopathy noted  SKIN:  [x ] No rashes or lesions, [ ] Stasis skin changes    LABS:                        14.5   6.00  )-----------( 211      ( 03 Dec 2023 06:40 )             44.5     12-03    142  |  103  |  6<L>  ----------------------------<  96  3.9   |  34<H>  |  0.76    Ca    9.3      03 Dec 2023 06:40  Phos  3.5     12-02  Mg     2.0     12-02      PT/INR - ( 03 Dec 2023 06:40 )   PT: 11.1 sec;   INR: 0.95 ratio         PTT - ( 03 Dec 2023 06:40 )  PTT:34.8 sec  Urinalysis Basic - ( 03 Dec 2023 06:40 )    Color: x / Appearance: x / SG: x / pH: x  Gluc: 96 mg/dL / Ketone: x  / Bili: x / Urobili: x   Blood: x / Protein: x / Nitrite: x   Leuk Esterase: x / RBC: x / WBC x   Sq Epi: x / Non Sq Epi: x / Bacteria: x      CAPILLARY BLOOD GLUCOSE          RADIOLOGY & ADDITIONAL STUDIES:    EK/29: 93 BPM Normal sinus rhythm. Pulmonary disease pattern    Chest XR  : IMPRESSION:  1. Shallow inspiratory effort with the patient rotated however the   visualized lungs are clear with no infiltrates, pleural effusions or CHF.  2. No free air seen beneath the diaphragm.    Abdominal XR :   IMPRESSION: Nonspecific bowel gas pattern      CT AP with oral contrast : Mid small bowel is mildly distended and fecalized with gradual transition   and tortuosity adjacent to umbilical hernia mesh. Distal small bowel is   collapsed. Findings are concerning for small bowel obstruction.    Portion of the mesh is not fully apposed to the abdominal wall and is   seen intervening between small bowel loops, for example on image 69   series 601 and image 75 series 2. Adhesive disease is likely present.  Trace ascites. Coronary artery calcifications.    XR Small bowel series :  precontrast abdominal radiographs demonstrate the presence of   enteric tube terminating within the stomach as well as residual contrast   within the large bowel. Unremarkable bowel gas pattern within the limits   of radiography. L2-L3 posterior fusion hardware is noted.    After administration of contrast via the enteric tube, contrast is noted   to reach the terminal ileum and likely the cecum, distal to the level of   obstruction identified on comparison CT, within 2 hours.    Lives: at home with wife  ADLs: independent  Diet: No restrictions  Occupation: retired  Alcohol Use: 3-4 beers/week  Tobacco Use: none  Recreational Drug Use: none   Patient is a 63y old  Male with PMH HLD. sleep apnea, asthma, Barretts esophagus, SCCs and melanomas who presents with a chief complaint of SBO (02 Dec 2023 12:38).     HPI:  Patient is a 63y old  Male with PMH HLD. sleep apnea, asthma, Barretts esophagus, SCCs and melanomas who presents with a chief complaint of SBO. Patient has a history of multiple SBOs in the past s/p umbilical hernia repair about 15 years ago with mesh (mesh type was recalled). On  AM, patient started having dark brown vomiting with no other accompanying sx.   Patient denied fever, chills, SOB, chest pain, diarrhea. Each time he gets SBO, he has this same presentation and is treated with NG tube.  Here he was treated with NG tube. NG tube was removed on  with no complications. He was admitted to the surgery team to plan for enterolysis. Of note, patient is a former [employer]  who worked at the site of [facility] after . Receives regular physicals and pulm follow up through [facility] services.      PAST MEDICAL & SURGICAL HISTORY:  Hyperlipidemia      Sleep apnea  CPAP      Asthma  [facility] responder--followed by [facility] monitoring center      History of Thomson's esophagus      GERD (gastroesophageal reflux disease)      Arthritis      H/O squamous cell carcinoma excision  left hand      H/O melanoma excision  Left thigh      S/P lumbar fusion  L3-L4  ---      S/P inguinal hernia repair  right--      H/O umbilical hernia repair        S/P total knee replacement, right  2018      H/O cardiac catheterization  7-8 yr ago---normal          REVIEW OF SYSTEMS:  CONSTITUTIONAL: No fever,No chills,No weakness,No weight loss, or fatigue  EYES: No eye pain, visual disturbances, or discharge  ENMT:  No ear pain,No tinnitus,No vertigo; No sinus or throat pain  NECK: No pain or stiffness  RESPIRATORY: No cough, wheezing, hemoptysis; No shortness of breath  CARDIOVASCULAR: No chest pain, No palpitations,  GASTROINTESTINAL: No abdominal  pain. No nausea, Vomiting resolved, No hematemesis; No diarrhea or constipation. No melena,No BRBPR  GENITOURINARY: No dysuria,No Urgency, No frequency, No hematuria, No incontinence  NEUROLOGICAL: No headaches,No numbness,No tingling,NO Dizziness, No tremors  SKIN: No itching, burning, rashes, or lesions   PSYCHIATRIC: No depression, anxiety, mood swings, or difficulty sleeping  Rest Of ROS-NORMAL-( )  Unable to obtain ROS 2 to -        MEDICATIONS  (STANDING):  heparin   Injectable 5000 Unit(s) SubCutaneous every 8 hours  influenza  Vaccine (HIGH DOSE) 0.7 milliLiter(s) IntraMuscular once  lactated ringers. 1000 milliLiter(s) (75 mL/Hr) IV Continuous <Continuous>  pantoprazole  Injectable 40 milliGRAM(s) IV Push daily    MEDICATIONS  (PRN):  albuterol    90 MICROgram(s) HFA Inhaler 1 Puff(s) Inhalation every 6 hours PRN Shortness of Breath and/or Wheezing      Allergies    No Known Allergies    Intolerances        SOCIAL HISTORY:    FAMILY HISTORY:  Family history of hyperlipidemia (Mother)        Vital Signs Last 24 Hrs  T(C): 36.6 (03 Dec 2023 04:18), Max: 36.6 (02 Dec 2023 11:13)  T(F): 97.8 (03 Dec 2023 04:18), Max: 97.9 (02 Dec 2023 11:13)  HR: 61 (03 Dec 2023 04:18) (61 - 67)  BP: 166/86 (03 Dec 2023 04:18) (145/85 - 166/86)  BP(mean): --  RR: 18 (03 Dec 2023 04:18) (18 - 18)  SpO2: 97% (03 Dec 2023 04:18) (96% - 97%)    Parameters below as of 03 Dec 2023 04:18  Patient On (Oxygen Delivery Method): room air        PHYSICAL EXAM:  GENERAL:  [ x]NAD , [x ] well appearing, [ ] Agitated, [ ] Lethargy, [ ] confused   HEAD:  [x ] Normal, [ ] Other  EYES:  [ x] EOMI, [x ] PERRLA, [ x] conjunctiva and sclera clear normal, [ ] Other  ENMT:  [ x] Normal, [x ] Moist mucous membranes, [ ] Good dentition, [ ] No lesions  NERVOUS SYSTEM:  [x ] Alert & Oriented X3, [ ]Confusion [ ] Nonfocal [ ] Encephalopathic [ ] Sedated [ ] Other-  CHEST/LUNG:  [x] Clear to auscultation bilaterally,[ x] No rales,[x ] No rhonchi [x ]  No wheezing  HEART:  [x]Regular rate and rhythm [ ] irregular [x] No murmurs, rubs, or gallops, [ ] PPM in place (Mfr:  )  ABDOMEN:  [x ] Soft, [ x] mildly TTP, [x ] mildly distended; [ ]No mass, [x ] Bowel sounds present, [ ] obese  EXTREMITIES: [ ] 2+ Peripheral Pulses, No clubbing, cyanosis,  [x] edema  LYMPH: No lymphadenopathy noted  SKIN:  [x ] No rashes or lesions, [ ] Stasis skin changes    LABS:                        14.5   6.00  )-----------( 211      ( 03 Dec 2023 06:40 )             44.5     12-03    142  |  103  |  6<L>  ----------------------------<  96  3.9   |  34<H>  |  0.76    Ca    9.3      03 Dec 2023 06:40  Phos  3.5     12-02  Mg     2.0     12-02      PT/INR - ( 03 Dec 2023 06:40 )   PT: 11.1 sec;   INR: 0.95 ratio         PTT - ( 03 Dec 2023 06:40 )  PTT:34.8 sec  Urinalysis Basic - ( 03 Dec 2023 06:40 )    Color: x / Appearance: x / SG: x / pH: x  Gluc: 96 mg/dL / Ketone: x  / Bili: x / Urobili: x   Blood: x / Protein: x / Nitrite: x   Leuk Esterase: x / RBC: x / WBC x   Sq Epi: x / Non Sq Epi: x / Bacteria: x      CAPILLARY BLOOD GLUCOSE          RADIOLOGY & ADDITIONAL STUDIES:    EK/29: 93 BPM Normal sinus rhythm. Pulmonary disease pattern    Chest XR  : IMPRESSION:  1. Shallow inspiratory effort with the patient rotated however the   visualized lungs are clear with no infiltrates, pleural effusions or CHF.  2. No free air seen beneath the diaphragm.    Abdominal XR :   IMPRESSION: Nonspecific bowel gas pattern      CT AP with oral contrast : Mid small bowel is mildly distended and fecalized with gradual transition   and tortuosity adjacent to umbilical hernia mesh. Distal small bowel is   collapsed. Findings are concerning for small bowel obstruction.    Portion of the mesh is not fully apposed to the abdominal wall and is   seen intervening between small bowel loops, for example on image 69   series 601 and image 75 series 2. Adhesive disease is likely present.  Trace ascites. Coronary artery calcifications.    XR Small bowel series :  precontrast abdominal radiographs demonstrate the presence of   enteric tube terminating within the stomach as well as residual contrast   within the large bowel. Unremarkable bowel gas pattern within the limits   of radiography. L2-L3 posterior fusion hardware is noted.    After administration of contrast via the enteric tube, contrast is noted   to reach the terminal ileum and likely the cecum, distal to the level of   obstruction identified on comparison CT, within 2 hours.    Lives: at home with wife  ADLs: independent  Diet: No restrictions  Occupation: retired  Alcohol Use: 3-4 beers/week  Tobacco Use: none  Recreational Drug Use: none   Patient is a 63y old  Male with PMH HLD. sleep apnea, asthma, Barretts esophagus, SCCs and melanomas who presents with a chief complaint of SBO (02 Dec 2023 12:38).     HPI:  Patient is a 63y old  Male with PMH HLD. sleep apnea, asthma, Barretts esophagus, SCCs and melanomas who presents with a chief complaint of SBO. Patient has a history of multiple SBOs in the past s/p umbilical hernia repair about 15 years ago with mesh (mesh type was recalled). On  AM, patient started having dark brown vomiting with no other accompanying sx.   Patient denied fever, chills, SOB, chest pain, diarrhea. Each time he gets SBO, he has this same presentation and is treated with NG tube.  Here he was treated with NG tube. NG tube was removed on  with no complications. He was admitted to the surgery team to plan for enterolysis. Of note, patient is a former [employer]  who worked at the site of [facility] after . Receives regular physicals and pulm follow up through [facility] services.      PAST MEDICAL & SURGICAL HISTORY:  Hyperlipidemia      Sleep apnea  CPAP      Asthma  [facility] responder--followed by [facility] monitoring center      History of Thomson's esophagus      GERD (gastroesophageal reflux disease)      Arthritis      H/O squamous cell carcinoma excision  left hand      H/O melanoma excision  Left thigh      S/P lumbar fusion  L3-L4  ---      S/P inguinal hernia repair  right--      H/O umbilical hernia repair        S/P total knee replacement, right  2018      H/O cardiac catheterization  7-8 yr ago---normal          REVIEW OF SYSTEMS:  CONSTITUTIONAL: No fever,No chills,No weakness,No weight loss, or fatigue  EYES: No eye pain, visual disturbances, or discharge  ENMT:  No ear pain,No tinnitus,No vertigo; No sinus or throat pain  NECK: No pain or stiffness  RESPIRATORY: No cough, wheezing, hemoptysis; No shortness of breath  CARDIOVASCULAR: No chest pain, No palpitations,  GASTROINTESTINAL: No abdominal  pain. No nausea, Vomiting resolved, No hematemesis; No diarrhea or constipation. No melena,No BRBPR  GENITOURINARY: No dysuria,No Urgency, No frequency, No hematuria, No incontinence  NEUROLOGICAL: No headaches,No numbness,No tingling,NO Dizziness, No tremors  SKIN: No itching, burning, rashes, or lesions   PSYCHIATRIC: No depression, anxiety, mood swings, or difficulty sleeping  Rest Of ROS-NORMAL-( )  Unable to obtain ROS 2 to -        MEDICATIONS  (STANDING):  heparin   Injectable 5000 Unit(s) SubCutaneous every 8 hours  influenza  Vaccine (HIGH DOSE) 0.7 milliLiter(s) IntraMuscular once  lactated ringers. 1000 milliLiter(s) (75 mL/Hr) IV Continuous <Continuous>  pantoprazole  Injectable 40 milliGRAM(s) IV Push daily    MEDICATIONS  (PRN):  albuterol    90 MICROgram(s) HFA Inhaler 1 Puff(s) Inhalation every 6 hours PRN Shortness of Breath and/or Wheezing      Allergies    No Known Allergies    Intolerances        SOCIAL HISTORY:    FAMILY HISTORY:  Family history of hyperlipidemia (Mother)        Vital Signs Last 24 Hrs  T(C): 36.6 (03 Dec 2023 04:18), Max: 36.6 (02 Dec 2023 11:13)  T(F): 97.8 (03 Dec 2023 04:18), Max: 97.9 (02 Dec 2023 11:13)  HR: 61 (03 Dec 2023 04:18) (61 - 67)  BP: 166/86 (03 Dec 2023 04:18) (145/85 - 166/86)  BP(mean): --  RR: 18 (03 Dec 2023 04:18) (18 - 18)  SpO2: 97% (03 Dec 2023 04:18) (96% - 97%)    Parameters below as of 03 Dec 2023 04:18  Patient On (Oxygen Delivery Method): room air        PHYSICAL EXAM:  GENERAL:  [ x]NAD , [x ] well appearing, [ ] Agitated, [ ] Lethargy, [ ] confused   HEAD:  [x ] Normal, [ ] Other  EYES:  [ x] EOMI, [x ] PERRLA, [ x] conjunctiva and sclera clear normal, [ ] Other  ENMT:  [ x] Normal, [x ] Moist mucous membranes, [ ] Good dentition, [ ] No lesions  NERVOUS SYSTEM:  [x ] Alert & Oriented X3, [ ]Confusion [ ] Nonfocal [ ] Encephalopathic [ ] Sedated [ ] Other-  CHEST/LUNG:  [x] Clear to auscultation bilaterally,[ x] No rales,[x ] No rhonchi [x ]  No wheezing  HEART:  [x]Regular rate and rhythm [ ] irregular [x] No murmurs, rubs, or gallops, [ ] PPM in place (Mfr:  )  ABDOMEN:  [x ] Soft, [ x] mildly TTP, [x ] mildly distended; [ ]No mass, [x ] Bowel sounds present, [ ] obese  EXTREMITIES: [ ] 2+ Peripheral Pulses, No clubbing, cyanosis,  [x] edema  LYMPH: No lymphadenopathy noted  SKIN:  [x ] No rashes or lesions, [ ] Stasis skin changes    LABS:                        14.5   6.00  )-----------( 211      ( 03 Dec 2023 06:40 )             44.5     12-03    142  |  103  |  6<L>  ----------------------------<  96  3.9   |  34<H>  |  0.76    Ca    9.3      03 Dec 2023 06:40  Phos  3.5     12-02  Mg     2.0     12-02      PT/INR - ( 03 Dec 2023 06:40 )   PT: 11.1 sec;   INR: 0.95 ratio         PTT - ( 03 Dec 2023 06:40 )  PTT:34.8 sec  Urinalysis Basic - ( 03 Dec 2023 06:40 )    Color: x / Appearance: x / SG: x / pH: x  Gluc: 96 mg/dL / Ketone: x  / Bili: x / Urobili: x   Blood: x / Protein: x / Nitrite: x   Leuk Esterase: x / RBC: x / WBC x   Sq Epi: x / Non Sq Epi: x / Bacteria: x      CAPILLARY BLOOD GLUCOSE          RADIOLOGY & ADDITIONAL STUDIES:    EK/29: 93 BPM Normal sinus rhythm. Pulmonary disease pattern    Chest XR  : IMPRESSION:  1. Shallow inspiratory effort with the patient rotated however the   visualized lungs are clear with no infiltrates, pleural effusions or CHF.  2. No free air seen beneath the diaphragm.    Abdominal XR :   IMPRESSION: Nonspecific bowel gas pattern      CT AP with oral contrast : Mid small bowel is mildly distended and fecalized with gradual transition   and tortuosity adjacent to umbilical hernia mesh. Distal small bowel is   collapsed. Findings are concerning for small bowel obstruction.    Portion of the mesh is not fully apposed to the abdominal wall and is   seen intervening between small bowel loops, for example on image 69   series 601 and image 75 series 2. Adhesive disease is likely present.  Trace ascites. Coronary artery calcifications.    XR Small bowel series :  precontrast abdominal radiographs demonstrate the presence of   enteric tube terminating within the stomach as well as residual contrast   within the large bowel. Unremarkable bowel gas pattern within the limits   of radiography. L2-L3 posterior fusion hardware is noted.    After administration of contrast via the enteric tube, contrast is noted   to reach the terminal ileum and likely the cecum, distal to the level of   obstruction identified on comparison CT, within 2 hours.    Lives: at home with wife  ADLs: independent  Diet: No restrictions  Occupation: retired  Alcohol Use: 3-4 beers/week  Tobacco Use: none  Recreational Drug Use: none   Patient is a 63y old  Male with PMH HLD. sleep apnea, asthma, Barretts esophagus, SCCs and melanomas who presents with a chief complaint of SBO (02 Dec 2023 12:38).     HPI:  Patient is a 63y old  Male with PMH HLD. sleep apnea, asthma, Barretts esophagus, SCCs and melanomas who presents with a chief complaint of SBO. Patient has a history of multiple SBOs in the past s/p umbilical hernia repair about 15 years ago with mesh (mesh type was recalled). On  AM, patient started having dark brown vomiting with no other accompanying sx.   Patient denied fever, chills, SOB, chest pain, diarrhea. Each time he gets SBO, he has this same presentation and is treated with NG tube.  Here he was treated with NG tube. NG tube was removed on  with no complications. He was admitted to the surgery team to plan for enterolysis. Of note, patient is a former [employer]  who worked at the site of [facility] after . Receives regular physicals and pulm follow up through [facility] services.      PAST MEDICAL & SURGICAL HISTORY:  Hyperlipidemia      Sleep apnea  CPAP      Asthma  [facility] responder--followed by [facility] monitoring center      History of Thomson's esophagus      GERD (gastroesophageal reflux disease)      Arthritis      H/O squamous cell carcinoma excision  left hand      H/O melanoma excision  Left thigh      S/P lumbar fusion  L3-L4  ---      S/P inguinal hernia repair  right--      H/O umbilical hernia repair        S/P total knee replacement, right  2018      H/O cardiac catheterization  7-8 yr ago---normal          REVIEW OF SYSTEMS:  CONSTITUTIONAL: No fever,No chills,No weakness,No weight loss, or fatigue  EYES: No eye pain, visual disturbances, or discharge  ENMT:  No ear pain,No tinnitus,No vertigo; No sinus or throat pain  NECK: No pain or stiffness  RESPIRATORY: No cough, wheezing, hemoptysis; No shortness of breath  CARDIOVASCULAR: No chest pain, No palpitations,  GASTROINTESTINAL: No abdominal  pain. No nausea, Vomiting resolved, No hematemesis; No diarrhea or constipation. No melena,No BRBPR  GENITOURINARY: No dysuria,No Urgency, No frequency, No hematuria, No incontinence  NEUROLOGICAL: No headaches,No numbness,No tingling,NO Dizziness, No tremors  SKIN: No itching, burning, rashes, or lesions   PSYCHIATRIC: No depression, anxiety, mood swings, or difficulty sleeping  Rest Of ROS-NORMAL-( )  Unable to obtain ROS 2 to -        MEDICATIONS  (STANDING):  heparin   Injectable 5000 Unit(s) SubCutaneous every 8 hours  influenza  Vaccine (HIGH DOSE) 0.7 milliLiter(s) IntraMuscular once  lactated ringers. 1000 milliLiter(s) (75 mL/Hr) IV Continuous <Continuous>  pantoprazole  Injectable 40 milliGRAM(s) IV Push daily    MEDICATIONS  (PRN):  albuterol    90 MICROgram(s) HFA Inhaler 1 Puff(s) Inhalation every 6 hours PRN Shortness of Breath and/or Wheezing      Allergies    No Known Allergies    Intolerances        SOCIAL HISTORY:    FAMILY HISTORY:  Family history of hyperlipidemia (Mother)        Vital Signs Last 24 Hrs  T(C): 36.6 (03 Dec 2023 04:18), Max: 36.6 (02 Dec 2023 11:13)  T(F): 97.8 (03 Dec 2023 04:18), Max: 97.9 (02 Dec 2023 11:13)  HR: 61 (03 Dec 2023 04:18) (61 - 67)  BP: 166/86 (03 Dec 2023 04:18) (145/85 - 166/86)  BP(mean): --  RR: 18 (03 Dec 2023 04:18) (18 - 18)  SpO2: 97% (03 Dec 2023 04:18) (96% - 97%)    Parameters below as of 03 Dec 2023 04:18  Patient On (Oxygen Delivery Method): room air        PHYSICAL EXAM:  GENERAL:  [ x]NAD , [x ] well appearing, [ ] Agitated, [ ] Lethargy, [ ] confused   HEAD:  [x ] Normal, [ ] Other  EYES:  [ x] EOMI, [x ] PERRLA, [ x] conjunctiva and sclera clear normal, [ ] Other  ENMT:  [ x] Normal, [x ] Moist mucous membranes, [ ] Good dentition, [ ] No lesions  NERVOUS SYSTEM:  [x ] Alert & Oriented X3, [ ]Confusion [ ] Nonfocal [ ] Encephalopathic [ ] Sedated [ ] Other-  CHEST/LUNG:  [x] Clear to auscultation bilaterally,[ x] No rales,[x ] No rhonchi [x ]  No wheezing  HEART:  [x]Regular rate and rhythm [ ] irregular [x] No murmurs, rubs, or gallops, [ ] PPM in place (Mfr:  )  ABDOMEN:  [x ] Soft, [ x] mildly TTP, [x ] mildly distended; [ ]No mass, [x ] Bowel sounds present, [ ] obese  EXTREMITIES: [ ] 2+ Peripheral Pulses, No clubbing, cyanosis,  [x] edema  LYMPH: No lymphadenopathy noted  SKIN:  [x ] No rashes or lesions, [ ] Stasis skin changes    LABS:                        14.5   6.00  )-----------( 211      ( 03 Dec 2023 06:40 )             44.5     12-03    142  |  103  |  6<L>  ----------------------------<  96  3.9   |  34<H>  |  0.76    Ca    9.3      03 Dec 2023 06:40  Phos  3.5     12-02  Mg     2.0     12-02      PT/INR - ( 03 Dec 2023 06:40 )   PT: 11.1 sec;   INR: 0.95 ratio         PTT - ( 03 Dec 2023 06:40 )  PTT:34.8 sec  Urinalysis Basic - ( 03 Dec 2023 06:40 )    Color: x / Appearance: x / SG: x / pH: x  Gluc: 96 mg/dL / Ketone: x  / Bili: x / Urobili: x   Blood: x / Protein: x / Nitrite: x   Leuk Esterase: x / RBC: x / WBC x   Sq Epi: x / Non Sq Epi: x / Bacteria: x      CAPILLARY BLOOD GLUCOSE          RADIOLOGY & ADDITIONAL STUDIES:    EK/29: 93 BPM Normal sinus rhythm. Pulmonary disease pattern    Chest XR  : IMPRESSION:  1. Shallow inspiratory effort with the patient rotated however the   visualized lungs are clear with no infiltrates, pleural effusions or CHF.  2. No free air seen beneath the diaphragm.    Abdominal XR :   IMPRESSION: Nonspecific bowel gas pattern      CT AP with oral contrast : Mid small bowel is mildly distended and fecalized with gradual transition   and tortuosity adjacent to umbilical hernia mesh. Distal small bowel is   collapsed. Findings are concerning for small bowel obstruction.    Portion of the mesh is not fully apposed to the abdominal wall and is   seen intervening between small bowel loops, for example on image 69   series 601 and image 75 series 2. Adhesive disease is likely present.  Trace ascites. Coronary artery calcifications.    XR Small bowel series :  precontrast abdominal radiographs demonstrate the presence of   enteric tube terminating within the stomach as well as residual contrast   within the large bowel. Unremarkable bowel gas pattern within the limits   of radiography. L2-L3 posterior fusion hardware is noted.    After administration of contrast via the enteric tube, contrast is noted   to reach the terminal ileum and likely the cecum, distal to the level of   obstruction identified on comparison CT, within 2 hours.    Lives: at home with wife  ADLs: independent  Diet: No restrictions  Occupation: retired  Alcohol Use: 3-4 beers/week  Tobacco Use: none  Recreational Drug Use: none   Patient is a 63y old  Male with PMH HLD. sleep apnea, asthma, Barretts esophagus, SCCs and melanomas who presents with a chief complaint of SBO (02 Dec 2023 12:38).     HPI:  Patient is a 63y old  Male with PMH HLD. sleep apnea, asthma, Barretts esophagus, SCCs and melanomas who presents with a chief complaint of SBO. Patient has a history of multiple SBOs in the past s/p umbilical hernia repair about 15 years ago with mesh (mesh type was recalled). On  AM, patient started having dark brown vomiting with no other accompanying sx.   Patient denied fever, chills, SOB, chest pain, diarrhea. Each time he gets SBO, he has this same presentation and is treated with NG tube.  Here he was treated with NG tube. NG tube was removed on  with no complications. He was admitted to the surgery team to plan for enterolysis. Of note, patient is a former [employer]  who worked at the site of [facility] after . Receives regular physicals and pulm follow up through [facility] services.      PAST MEDICAL & SURGICAL HISTORY:  Hyperlipidemia      Sleep apnea  CPAP      Asthma  [facility] responder--followed by [facility] monitoring center      History of Thomson's esophagus      GERD (gastroesophageal reflux disease)      Arthritis      H/O squamous cell carcinoma excision  left hand      H/O melanoma excision  Left thigh      S/P lumbar fusion  L3-L4  ---      S/P inguinal hernia repair  right--      H/O umbilical hernia repair        S/P total knee replacement, right  2018      H/O cardiac catheterization  7-8 yr ago---normal          REVIEW OF SYSTEMS:  CONSTITUTIONAL: No fever,No chills,No weakness,No weight loss, or fatigue  EYES: No eye pain, visual disturbances, or discharge  ENMT:  No ear pain,No tinnitus,No vertigo; No sinus or throat pain  NECK: No pain or stiffness  RESPIRATORY: No cough, wheezing, hemoptysis; No shortness of breath  CARDIOVASCULAR: No chest pain, No palpitations,  GASTROINTESTINAL: No abdominal  pain. No nausea, Vomiting resolved, No hematemesis; No diarrhea or constipation. No melena,No BRBPR  GENITOURINARY: No dysuria,No Urgency, No frequency, No hematuria, No incontinence  NEUROLOGICAL: No headaches,No numbness,No tingling,NO Dizziness, No tremors  SKIN: No itching, burning, rashes, or lesions   PSYCHIATRIC: No depression, anxiety, mood swings, or difficulty sleeping  Rest Of ROS-NORMAL-( )  Unable to obtain ROS 2 to -        MEDICATIONS  (STANDING):  heparin   Injectable 5000 Unit(s) SubCutaneous every 8 hours  influenza  Vaccine (HIGH DOSE) 0.7 milliLiter(s) IntraMuscular once  lactated ringers. 1000 milliLiter(s) (75 mL/Hr) IV Continuous <Continuous>  pantoprazole  Injectable 40 milliGRAM(s) IV Push daily    MEDICATIONS  (PRN):  albuterol    90 MICROgram(s) HFA Inhaler 1 Puff(s) Inhalation every 6 hours PRN Shortness of Breath and/or Wheezing      Allergies    No Known Allergies    Intolerances        SOCIAL HISTORY:    FAMILY HISTORY:  Family history of hyperlipidemia (Mother)        Vital Signs Last 24 Hrs  T(C): 36.6 (03 Dec 2023 04:18), Max: 36.6 (02 Dec 2023 11:13)  T(F): 97.8 (03 Dec 2023 04:18), Max: 97.9 (02 Dec 2023 11:13)  HR: 61 (03 Dec 2023 04:18) (61 - 67)  BP: 166/86 (03 Dec 2023 04:18) (145/85 - 166/86)  BP(mean): --  RR: 18 (03 Dec 2023 04:18) (18 - 18)  SpO2: 97% (03 Dec 2023 04:18) (96% - 97%)    Parameters below as of 03 Dec 2023 04:18  Patient On (Oxygen Delivery Method): room air        PHYSICAL EXAM:  GENERAL:  [ x]NAD , [x ] well appearing, [ ] Agitated, [ ] Lethargy, [ ] confused   HEAD:  [x ] Normal, [ ] Other  EYES:  [ x] EOMI, [x ] PERRLA, [ x] conjunctiva and sclera clear normal, [ ] Other  ENMT:  [ x] Normal, [x ] Moist mucous membranes, [ ] Good dentition, [ ] No lesions  NERVOUS SYSTEM:  [x ] Alert & Oriented X3, [ ]Confusion [ ] Nonfocal [ ] Encephalopathic [ ] Sedated [ ] Other-  CHEST/LUNG:  [x] Clear to auscultation bilaterally,[ x] No rales,[x ] No rhonchi [x ]  No wheezing  HEART:  [x]Regular rate and rhythm [ ] irregular [x] No murmurs, rubs, or gallops, [ ] PPM in place (Mfr:  )  ABDOMEN:  [x ] Soft, [ x] mildly TTP, [x ] mildly distended; [ ]No mass, [x ] Bowel sounds present, [ ] obese  EXTREMITIES: [ ] 2+ Peripheral Pulses, No clubbing, cyanosis,  [x] edema  LYMPH: No lymphadenopathy noted  SKIN:  [x ] No rashes or lesions, [ ] Stasis skin changes    LABS:                        14.5   6.00  )-----------( 211      ( 03 Dec 2023 06:40 )             44.5     12-03    142  |  103  |  6<L>  ----------------------------<  96  3.9   |  34<H>  |  0.76    Ca    9.3      03 Dec 2023 06:40  Phos  3.5     12-02  Mg     2.0     12-02      PT/INR - ( 03 Dec 2023 06:40 )   PT: 11.1 sec;   INR: 0.95 ratio         PTT - ( 03 Dec 2023 06:40 )  PTT:34.8 sec  Urinalysis Basic - ( 03 Dec 2023 06:40 )    Color: x / Appearance: x / SG: x / pH: x  Gluc: 96 mg/dL / Ketone: x  / Bili: x / Urobili: x   Blood: x / Protein: x / Nitrite: x   Leuk Esterase: x / RBC: x / WBC x   Sq Epi: x / Non Sq Epi: x / Bacteria: x      CAPILLARY BLOOD GLUCOSE          RADIOLOGY & ADDITIONAL STUDIES:    EK/29: 93 BPM Normal sinus rhythm. Pulmonary disease pattern    Chest XR  : IMPRESSION:  1. Shallow inspiratory effort with the patient rotated however the   visualized lungs are clear with no infiltrates, pleural effusions or CHF.  2. No free air seen beneath the diaphragm.    Abdominal XR :   IMPRESSION: Nonspecific bowel gas pattern      CT AP with oral contrast : Mid small bowel is mildly distended and fecalized with gradual transition   and tortuosity adjacent to umbilical hernia mesh. Distal small bowel is   collapsed. Findings are concerning for small bowel obstruction.    Portion of the mesh is not fully apposed to the abdominal wall and is   seen intervening between small bowel loops, for example on image 69   series 601 and image 75 series 2. Adhesive disease is likely present.  Trace ascites. Coronary artery calcifications.    XR Small bowel series :  precontrast abdominal radiographs demonstrate the presence of   enteric tube terminating within the stomach as well as residual contrast   within the large bowel. Unremarkable bowel gas pattern within the limits   of radiography. L2-L3 posterior fusion hardware is noted.    After administration of contrast via the enteric tube, contrast is noted   to reach the terminal ileum and likely the cecum, distal to the level of   obstruction identified on comparison CT, within 2 hours.    Lives: at home with wife  ADLs: independent  Diet: No restrictions  Occupation: retired  Alcohol Use: 3-4 beers/week  Tobacco Use: none  Recreational Drug Use: none   Patient is a 63y old  Male with PMH HLD. sleep apnea, asthma, Barretts esophagus, SCCs and melanomas who presents with a chief complaint of SBO (02 Dec 2023 12:38).     HPI:  Patient is a 63y old  Male with PMH HLD. sleep apnea, asthma, Barretts esophagus, SCCs and melanomas who presents with a chief complaint of SBO. Patient has a history of multiple SBOs in the past s/p umbilical hernia repair about 15 years ago with mesh (mesh type was recalled). On  AM, patient started having dark brown vomiting with no other accompanying sx.   Patient denied fever, chills, SOB, chest pain, diarrhea. Each time he gets SBO, he has this same presentation and is treated with NG tube.  Here he was treated with NG tube. NG tube was removed on  with no complications. He was admitted to the surgery team to plan for enterolysis. Of note, patient is a former [employer]  who worked at the site of [facility] after . Receives regular physicals and pulm follow up through [facility] services.      PAST MEDICAL & SURGICAL HISTORY:  Hyperlipidemia      Sleep apnea  CPAP      Asthma  [facility] responder--followed by [facility] monitoring center      History of Thomson's esophagus      GERD (gastroesophageal reflux disease)      Arthritis      H/O squamous cell carcinoma excision  left hand      H/O melanoma excision  Left thigh      S/P lumbar fusion  L3-L4  ---      S/P inguinal hernia repair  right--      H/O umbilical hernia repair        S/P total knee replacement, right  2018      H/O cardiac catheterization  7-8 yr ago---normal          REVIEW OF SYSTEMS:  CONSTITUTIONAL: No fever,No chills,No weakness,No weight loss, or fatigue  EYES: No eye pain, visual disturbances, or discharge  ENMT:  No ear pain,No tinnitus,No vertigo; No sinus or throat pain  NECK: No pain or stiffness  RESPIRATORY: No cough, wheezing, hemoptysis; No shortness of breath  CARDIOVASCULAR: No chest pain, No palpitations,  GASTROINTESTINAL: No abdominal  pain. No nausea, Vomiting resolved, No hematemesis; No diarrhea or constipation. No melena,No BRBPR  GENITOURINARY: No dysuria,No Urgency, No frequency, No hematuria, No incontinence  NEUROLOGICAL: No headaches,No numbness,No tingling,NO Dizziness, No tremors  SKIN: No itching, burning, rashes, or lesions   PSYCHIATRIC: No depression, anxiety, mood swings, or difficulty sleeping  Rest Of ROS-NORMAL-( )  Unable to obtain ROS 2 to -        MEDICATIONS  (STANDING):  heparin   Injectable 5000 Unit(s) SubCutaneous every 8 hours  influenza  Vaccine (HIGH DOSE) 0.7 milliLiter(s) IntraMuscular once  lactated ringers. 1000 milliLiter(s) (75 mL/Hr) IV Continuous <Continuous>  pantoprazole  Injectable 40 milliGRAM(s) IV Push daily    MEDICATIONS  (PRN):  albuterol    90 MICROgram(s) HFA Inhaler 1 Puff(s) Inhalation every 6 hours PRN Shortness of Breath and/or Wheezing      Allergies    No Known Allergies    Intolerances        SOCIAL HISTORY:    FAMILY HISTORY:  Family history of hyperlipidemia (Mother)        Vital Signs Last 24 Hrs  T(C): 36.6 (03 Dec 2023 04:18), Max: 36.6 (02 Dec 2023 11:13)  T(F): 97.8 (03 Dec 2023 04:18), Max: 97.9 (02 Dec 2023 11:13)  HR: 61 (03 Dec 2023 04:18) (61 - 67)  BP: 166/86 (03 Dec 2023 04:18) (145/85 - 166/86)  BP(mean): --  RR: 18 (03 Dec 2023 04:18) (18 - 18)  SpO2: 97% (03 Dec 2023 04:18) (96% - 97%)    Parameters below as of 03 Dec 2023 04:18  Patient On (Oxygen Delivery Method): room air        PHYSICAL EXAM:  GENERAL:  [ x]NAD , [x ] well appearing, [ ] Agitated, [ ] Lethargy, [ ] confused   HEAD:  [x ] Normal, [ ] Other  EYES:  [ x] EOMI, [x ] PERRLA, [ x] conjunctiva and sclera clear normal, [ ] Other  ENMT:  [ x] Normal, [x ] Moist mucous membranes, [ ] Good dentition, [ ] No lesions  NERVOUS SYSTEM:  [x ] Alert & Oriented X3, [ ]Confusion [ ] Nonfocal [ ] Encephalopathic [ ] Sedated [ ] Other-  CHEST/LUNG:  [x] Clear to auscultation bilaterally,[ x] No rales,[x ] No rhonchi [x ]  No wheezing  HEART:  [x]Regular rate and rhythm [ ] irregular [x] No murmurs, rubs, or gallops, [ ] PPM in place (Mfr:  )  ABDOMEN:  [x ] Soft, [ x] mildly TTP, [x ] mildly distended; [ ]No mass, [x ] Bowel sounds present, [ ] obese  EXTREMITIES: [ ] 2+ Peripheral Pulses, No clubbing, cyanosis,  [x] edema  LYMPH: No lymphadenopathy noted  SKIN:  [x ] No rashes or lesions, [ ] Stasis skin changes    LABS:                        14.5   6.00  )-----------( 211      ( 03 Dec 2023 06:40 )             44.5     12-03    142  |  103  |  6<L>  ----------------------------<  96  3.9   |  34<H>  |  0.76    Ca    9.3      03 Dec 2023 06:40  Phos  3.5     12-02  Mg     2.0     12-02      PT/INR - ( 03 Dec 2023 06:40 )   PT: 11.1 sec;   INR: 0.95 ratio         PTT - ( 03 Dec 2023 06:40 )  PTT:34.8 sec  Urinalysis Basic - ( 03 Dec 2023 06:40 )    Color: x / Appearance: x / SG: x / pH: x  Gluc: 96 mg/dL / Ketone: x  / Bili: x / Urobili: x   Blood: x / Protein: x / Nitrite: x   Leuk Esterase: x / RBC: x / WBC x   Sq Epi: x / Non Sq Epi: x / Bacteria: x      CAPILLARY BLOOD GLUCOSE          RADIOLOGY & ADDITIONAL STUDIES:    EK/29: 93 BPM Normal sinus rhythm. Pulmonary disease pattern    Chest XR  : IMPRESSION:  1. Shallow inspiratory effort with the patient rotated however the   visualized lungs are clear with no infiltrates, pleural effusions or CHF.  2. No free air seen beneath the diaphragm.    Abdominal XR :   IMPRESSION: Nonspecific bowel gas pattern      CT AP with oral contrast : Mid small bowel is mildly distended and fecalized with gradual transition   and tortuosity adjacent to umbilical hernia mesh. Distal small bowel is   collapsed. Findings are concerning for small bowel obstruction.    Portion of the mesh is not fully apposed to the abdominal wall and is   seen intervening between small bowel loops, for example on image 69   series 601 and image 75 series 2. Adhesive disease is likely present.  Trace ascites. Coronary artery calcifications.    XR Small bowel series :  precontrast abdominal radiographs demonstrate the presence of   enteric tube terminating within the stomach as well as residual contrast   within the large bowel. Unremarkable bowel gas pattern within the limits   of radiography. L2-L3 posterior fusion hardware is noted.    After administration of contrast via the enteric tube, contrast is noted   to reach the terminal ileum and likely the cecum, distal to the level of   obstruction identified on comparison CT, within 2 hours.    Lives: at home with wife  ADLs: independent  Diet: No restrictions  Occupation: retired  Alcohol Use: 3-4 beers/week  Tobacco Use: none  Recreational Drug Use: none   children/spouse Patient is a 63y old  Male with PMH HLD. sleep apnea, asthma, Barretts esophagus, SCCs and melanomas who presents with a chief complaint of SBO (02 Dec 2023 12:38).     HPI:  Patient is a 63y old  Male with PMH HLD. sleep apnea, asthma, Barretts esophagus, SCCs and melanomas who presents with a chief complaint of SBO. Patient has a history of multiple SBOs in the past s/p umbilical hernia repair about 15 years ago with mesh (mesh type was recalled). On  AM, patient started having dark brown vomiting with no other accompanying sx.   Patient denied fever, chills, SOB, chest pain, diarrhea. Each time he gets SBO, he has this same presentation and is treated with NG tube.  Here he was treated with NG tube. NG tube was removed on  with no complications. He was admitted to the surgery team to plan for enterolysis. Of note, patient is a former [employer]  who worked at the site of [facility] after . Receives regular physicals and pulm follow up through [facility] services. Denies  any cardiac history.       PAST MEDICAL & SURGICAL HISTORY:  Hyperlipidemia      Sleep apnea  CPAP      Asthma  [facility] responder--followed by [facility] monitoring center      History of Thomson's esophagus      GERD (gastroesophageal reflux disease)      Arthritis      H/O squamous cell carcinoma excision  left hand      H/O melanoma excision  Left thigh      S/P lumbar fusion  L3-L4  ---      S/P inguinal hernia repair  right--      H/O umbilical hernia repair        S/P total knee replacement, right  2018      H/O cardiac catheterization  7-8 yr ago---normal          REVIEW OF SYSTEMS:  CONSTITUTIONAL: No fever,No chills,No weakness,No weight loss, or fatigue  EYES: No eye pain, visual disturbances, or discharge  ENMT:  No ear pain,No tinnitus,No vertigo; No sinus or throat pain  NECK: No pain or stiffness  RESPIRATORY: No cough, wheezing, hemoptysis; No shortness of breath  CARDIOVASCULAR: No chest pain, No palpitations,  GASTROINTESTINAL: No abdominal  pain. No nausea, Vomiting resolved, No hematemesis; No diarrhea or constipation. No melena,No BRBPR  GENITOURINARY: No dysuria,No Urgency, No frequency, No hematuria, No incontinence  NEUROLOGICAL: No headaches,No numbness,No tingling,NO Dizziness, No tremors  SKIN: No itching, burning, rashes, or lesions   PSYCHIATRIC: No depression, anxiety, mood swings, or difficulty sleeping  Rest Of ROS-NORMAL-( )  Unable to obtain ROS 2 to -        MEDICATIONS  (STANDING):  heparin   Injectable 5000 Unit(s) SubCutaneous every 8 hours  influenza  Vaccine (HIGH DOSE) 0.7 milliLiter(s) IntraMuscular once  lactated ringers. 1000 milliLiter(s) (75 mL/Hr) IV Continuous <Continuous>  pantoprazole  Injectable 40 milliGRAM(s) IV Push daily    MEDICATIONS  (PRN):  albuterol    90 MICROgram(s) HFA Inhaler 1 Puff(s) Inhalation every 6 hours PRN Shortness of Breath and/or Wheezing      Allergies    No Known Allergies    Intolerances        SOCIAL HISTORY:    FAMILY HISTORY:  Family history of hyperlipidemia (Mother)        Vital Signs Last 24 Hrs  T(C): 36.6 (03 Dec 2023 04:18), Max: 36.6 (02 Dec 2023 11:13)  T(F): 97.8 (03 Dec 2023 04:18), Max: 97.9 (02 Dec 2023 11:13)  HR: 61 (03 Dec 2023 04:18) (61 - 67)  BP: 166/86 (03 Dec 2023 04:18) (145/85 - 166/86)  BP(mean): --  RR: 18 (03 Dec 2023 04:18) (18 - 18)  SpO2: 97% (03 Dec 2023 04:18) (96% - 97%)    Parameters below as of 03 Dec 2023 04:18  Patient On (Oxygen Delivery Method): room air        PHYSICAL EXAM:  GENERAL:  [ x]NAD , [x ] well appearing, [ ] Agitated, [ ] Lethargy, [ ] confused   HEAD:  [x ] Normal, [ ] Other  EYES:  [ x] EOMI, [x ] PERRLA, [ x] conjunctiva and sclera clear normal, [ ] Other  ENMT:  [ x] Normal, [x ] Moist mucous membranes, [ ] Good dentition, [ ] No lesions  NERVOUS SYSTEM:  [x ] Alert & Oriented X3, [ ]Confusion [ ] Nonfocal [ ] Encephalopathic [ ] Sedated [ ] Other-  CHEST/LUNG:  [x] Clear to auscultation bilaterally,[ x] No rales,[x ] No rhonchi [x ]  No wheezing  HEART:  [x]Regular rate and rhythm [ ] irregular [x] No murmurs, rubs, or gallops, [ ] PPM in place (Mfr:  )  ABDOMEN:  [x ] Soft, [ x] mildly TTP, [x ] mildly distended; [ ]No mass, [x ] Bowel sounds present, [ ] obese  EXTREMITIES: [ ] 2+ Peripheral Pulses, No clubbing, cyanosis,  [x] edema  LYMPH: No lymphadenopathy noted  SKIN:  [x ] No rashes or lesions, [ ] Stasis skin changes    LABS:                        14.5   6.00  )-----------( 211      ( 03 Dec 2023 06:40 )             44.5     12-03    142  |  103  |  6<L>  ----------------------------<  96  3.9   |  34<H>  |  0.76    Ca    9.3      03 Dec 2023 06:40  Phos  3.5     12-02  Mg     2.0     12-02      PT/INR - ( 03 Dec 2023 06:40 )   PT: 11.1 sec;   INR: 0.95 ratio         PTT - ( 03 Dec 2023 06:40 )  PTT:34.8 sec  Urinalysis Basic - ( 03 Dec 2023 06:40 )    Color: x / Appearance: x / SG: x / pH: x  Gluc: 96 mg/dL / Ketone: x  / Bili: x / Urobili: x   Blood: x / Protein: x / Nitrite: x   Leuk Esterase: x / RBC: x / WBC x   Sq Epi: x / Non Sq Epi: x / Bacteria: x      CAPILLARY BLOOD GLUCOSE          RADIOLOGY & ADDITIONAL STUDIES:    EK/29: 93 BPM Normal sinus rhythm. Pulmonary disease pattern    Chest XR  : IMPRESSION:  1. Shallow inspiratory effort with the patient rotated however the   visualized lungs are clear with no infiltrates, pleural effusions or CHF.  2. No free air seen beneath the diaphragm.    Abdominal XR :   IMPRESSION: Nonspecific bowel gas pattern      CT AP with oral contrast : Mid small bowel is mildly distended and fecalized with gradual transition   and tortuosity adjacent to umbilical hernia mesh. Distal small bowel is   collapsed. Findings are concerning for small bowel obstruction.    Portion of the mesh is not fully apposed to the abdominal wall and is   seen intervening between small bowel loops, for example on image 69   series 601 and image 75 series 2. Adhesive disease is likely present.  Trace ascites. Coronary artery calcifications.    XR Small bowel series :  precontrast abdominal radiographs demonstrate the presence of   enteric tube terminating within the stomach as well as residual contrast   within the large bowel. Unremarkable bowel gas pattern within the limits   of radiography. L2-L3 posterior fusion hardware is noted.    After administration of contrast via the enteric tube, contrast is noted   to reach the terminal ileum and likely the cecum, distal to the level of   obstruction identified on comparison CT, within 2 hours.    Lives: at home with wife  ADLs: independent  Diet: No restrictions  Occupation: retired  Alcohol Use: 3-4 beers/week  Tobacco Use: none  Recreational Drug Use: none   Patient is a 63y old  Male with PMH HLD. sleep apnea, asthma, Barretts esophagus, SCCs and melanomas who presents with a chief complaint of SBO (02 Dec 2023 12:38).     HPI:  Patient is a 63y old  Male with PMH HLD. sleep apnea, asthma, Barretts esophagus, SCCs and melanomas who presents with a chief complaint of SBO. Patient has a history of multiple SBOs in the past s/p umbilical hernia repair about 15 years ago with mesh (mesh type was recalled). On  AM, patient started having dark brown vomiting with no other accompanying sx.   Patient denied fever, chills, SOB, chest pain, diarrhea. Each time he gets SBO, he has this same presentation and is treated with NG tube.  Here he was treated with NG tube. NG tube was removed on  with no complications. He was admitted to the surgery team to plan for enterolysis. Of note, patient is a former [employer]  who worked at the site of [facility] after . Receives regular physicals and pulm follow up through [facility] services. Denies  any cardiac history.       PAST MEDICAL & SURGICAL HISTORY:  Hyperlipidemia      Sleep apnea  CPAP      Asthma  [facility] responder--followed by [facility] monitoring center      History of Thomson's esophagus      GERD (gastroesophageal reflux disease)      Arthritis      H/O squamous cell carcinoma excision  left hand      H/O melanoma excision  Left thigh      S/P lumbar fusion  L3-L4  ---      S/P inguinal hernia repair  right--      H/O umbilical hernia repair        S/P total knee replacement, right  2018      H/O cardiac catheterization  7-8 yr ago---normal          REVIEW OF SYSTEMS:  CONSTITUTIONAL: No fever,No chills,No weakness,No weight loss, or fatigue  EYES: No eye pain, visual disturbances, or discharge  ENMT:  No ear pain,No tinnitus,No vertigo; No sinus or throat pain  NECK: No pain or stiffness  RESPIRATORY: No cough, wheezing, hemoptysis; No shortness of breath  CARDIOVASCULAR: No chest pain, No palpitations,  GASTROINTESTINAL: No abdominal  pain. No nausea, Vomiting resolved, No hematemesis; No diarrhea or constipation. No melena,No BRBPR  GENITOURINARY: No dysuria,No Urgency, No frequency, No hematuria, No incontinence  NEUROLOGICAL: No headaches,No numbness,No tingling,NO Dizziness, No tremors  SKIN: No itching, burning, rashes, or lesions   PSYCHIATRIC: No depression, anxiety, mood swings, or difficulty sleeping  Rest Of ROS-NORMAL-( )  Unable to obtain ROS 2 to -        MEDICATIONS  (STANDING):  heparin   Injectable 5000 Unit(s) SubCutaneous every 8 hours  influenza  Vaccine (HIGH DOSE) 0.7 milliLiter(s) IntraMuscular once  lactated ringers. 1000 milliLiter(s) (75 mL/Hr) IV Continuous <Continuous>  pantoprazole  Injectable 40 milliGRAM(s) IV Push daily    MEDICATIONS  (PRN):  albuterol    90 MICROgram(s) HFA Inhaler 1 Puff(s) Inhalation every 6 hours PRN Shortness of Breath and/or Wheezing      Allergies    No Known Allergies    Intolerances        SOCIAL HISTORY:    FAMILY HISTORY:  Family history of hyperlipidemia (Mother)        Vital Signs Last 24 Hrs  T(C): 36.6 (03 Dec 2023 04:18), Max: 36.6 (02 Dec 2023 11:13)  T(F): 97.8 (03 Dec 2023 04:18), Max: 97.9 (02 Dec 2023 11:13)  HR: 61 (03 Dec 2023 04:18) (61 - 67)  BP: 166/86 (03 Dec 2023 04:18) (145/85 - 166/86)  BP(mean): --  RR: 18 (03 Dec 2023 04:18) (18 - 18)  SpO2: 97% (03 Dec 2023 04:18) (96% - 97%)    Parameters below as of 03 Dec 2023 04:18  Patient On (Oxygen Delivery Method): room air        PHYSICAL EXAM:  GENERAL:  [ x]NAD , [x ] well appearing, [ ] Agitated, [ ] Lethargy, [ ] confused   HEAD:  [x ] Normal, [ ] Other  EYES:  [ x] EOMI, [x ] PERRLA, [ x] conjunctiva and sclera clear normal, [ ] Other  ENMT:  [ x] Normal, [x ] Moist mucous membranes, [ ] Good dentition, [ ] No lesions  NERVOUS SYSTEM:  [x ] Alert & Oriented X3, [ ]Confusion [ ] Nonfocal [ ] Encephalopathic [ ] Sedated [ ] Other-  CHEST/LUNG:  [x] Clear to auscultation bilaterally,[ x] No rales,[x ] No rhonchi [x ]  No wheezing  HEART:  [x]Regular rate and rhythm [ ] irregular [x] No murmurs, rubs, or gallops, [ ] PPM in place (Mfr:  )  ABDOMEN:  [x ] Soft, [ x] mildly TTP, [x ] mildly distended; [ ]No mass, [x ] Bowel sounds present, [ ] obese  EXTREMITIES: [ ] 2+ Peripheral Pulses, No clubbing, cyanosis,  [x] edema  LYMPH: No lymphadenopathy noted  SKIN:  [x ] No rashes or lesions, [ ] Stasis skin changes    LABS:                        14.5   6.00  )-----------( 211      ( 03 Dec 2023 06:40 )             44.5     12-03    142  |  103  |  6<L>  ----------------------------<  96  3.9   |  34<H>  |  0.76    Ca    9.3      03 Dec 2023 06:40  Phos  3.5     12-02  Mg     2.0     12-02      PT/INR - ( 03 Dec 2023 06:40 )   PT: 11.1 sec;   INR: 0.95 ratio         PTT - ( 03 Dec 2023 06:40 )  PTT:34.8 sec  Urinalysis Basic - ( 03 Dec 2023 06:40 )    Color: x / Appearance: x / SG: x / pH: x  Gluc: 96 mg/dL / Ketone: x  / Bili: x / Urobili: x   Blood: x / Protein: x / Nitrite: x   Leuk Esterase: x / RBC: x / WBC x   Sq Epi: x / Non Sq Epi: x / Bacteria: x      CAPILLARY BLOOD GLUCOSE          RADIOLOGY & ADDITIONAL STUDIES:    EK/29: 93 BPM Normal sinus rhythm. Pulmonary disease pattern    Chest XR  : IMPRESSION:  1. Shallow inspiratory effort with the patient rotated however the   visualized lungs are clear with no infiltrates, pleural effusions or CHF.  2. No free air seen beneath the diaphragm.    Abdominal XR :   IMPRESSION: Nonspecific bowel gas pattern      CT AP with oral contrast : Mid small bowel is mildly distended and fecalized with gradual transition   and tortuosity adjacent to umbilical hernia mesh. Distal small bowel is   collapsed. Findings are concerning for small bowel obstruction.    Portion of the mesh is not fully apposed to the abdominal wall and is   seen intervening between small bowel loops, for example on image 69   series 601 and image 75 series 2. Adhesive disease is likely present.  Trace ascites. Coronary artery calcifications.    XR Small bowel series :  precontrast abdominal radiographs demonstrate the presence of   enteric tube terminating within the stomach as well as residual contrast   within the large bowel. Unremarkable bowel gas pattern within the limits   of radiography. L2-L3 posterior fusion hardware is noted.    After administration of contrast via the enteric tube, contrast is noted   to reach the terminal ileum and likely the cecum, distal to the level of   obstruction identified on comparison CT, within 2 hours.    Lives: at home with wife  ADLs: independent  Diet: No restrictions  Occupation: retired  Alcohol Use: 3-4 beers/week  Tobacco Use: none  Recreational Drug Use: none   Patient is a 63y old  Male with PMH HLD. sleep apnea, asthma, Barretts esophagus, SCCs and melanomas who presents with a chief complaint of SBO (02 Dec 2023 12:38).     HPI:  Patient is a 63y old  Male with PMH HLD. sleep apnea, asthma, Barretts esophagus, SCCs and melanomas who presents with a chief complaint of SBO. Patient has a history of multiple SBOs in the past s/p umbilical hernia repair about 15 years ago with mesh (mesh type was recalled). On  AM, patient started having dark brown vomiting with no other accompanying sx.   Patient denied fever, chills, SOB, chest pain, diarrhea. Each time he gets SBO, he has this same presentation and is treated with NG tube.  Here he was treated with NG tube. NG tube was removed on  with no complications. He was admitted to the surgery team to plan for enterolysis. Of note, patient is a former [employer]  who worked at the site of [facility] after . Receives regular physicals and pulm follow up through [facility] services. Denies  any cardiac history.       PAST MEDICAL & SURGICAL HISTORY:  Hyperlipidemia      Sleep apnea  CPAP      Asthma  [facility] responder--followed by [facility] monitoring center      History of Thomson's esophagus      GERD (gastroesophageal reflux disease)      Arthritis      H/O squamous cell carcinoma excision  left hand      H/O melanoma excision  Left thigh      S/P lumbar fusion  L3-L4  ---      S/P inguinal hernia repair  right--      H/O umbilical hernia repair        S/P total knee replacement, right  2018      H/O cardiac catheterization  7-8 yr ago---normal          REVIEW OF SYSTEMS:  CONSTITUTIONAL: No fever,No chills,No weakness,No weight loss, or fatigue  EYES: No eye pain, visual disturbances, or discharge  ENMT:  No ear pain,No tinnitus,No vertigo; No sinus or throat pain  NECK: No pain or stiffness  RESPIRATORY: No cough, wheezing, hemoptysis; No shortness of breath  CARDIOVASCULAR: No chest pain, No palpitations,  GASTROINTESTINAL: No abdominal  pain. No nausea, Vomiting resolved, No hematemesis; No diarrhea or constipation. No melena,No BRBPR  GENITOURINARY: No dysuria,No Urgency, No frequency, No hematuria, No incontinence  NEUROLOGICAL: No headaches,No numbness,No tingling,NO Dizziness, No tremors  SKIN: No itching, burning, rashes, or lesions   PSYCHIATRIC: No depression, anxiety, mood swings, or difficulty sleeping  Rest Of ROS-NORMAL-( )  Unable to obtain ROS 2 to -        MEDICATIONS  (STANDING):  heparin   Injectable 5000 Unit(s) SubCutaneous every 8 hours  influenza  Vaccine (HIGH DOSE) 0.7 milliLiter(s) IntraMuscular once  lactated ringers. 1000 milliLiter(s) (75 mL/Hr) IV Continuous <Continuous>  pantoprazole  Injectable 40 milliGRAM(s) IV Push daily    MEDICATIONS  (PRN):  albuterol    90 MICROgram(s) HFA Inhaler 1 Puff(s) Inhalation every 6 hours PRN Shortness of Breath and/or Wheezing      Allergies    No Known Allergies    Intolerances        SOCIAL HISTORY:    FAMILY HISTORY:  Family history of hyperlipidemia (Mother)        Vital Signs Last 24 Hrs  T(C): 36.6 (03 Dec 2023 04:18), Max: 36.6 (02 Dec 2023 11:13)  T(F): 97.8 (03 Dec 2023 04:18), Max: 97.9 (02 Dec 2023 11:13)  HR: 61 (03 Dec 2023 04:18) (61 - 67)  BP: 166/86 (03 Dec 2023 04:18) (145/85 - 166/86)  BP(mean): --  RR: 18 (03 Dec 2023 04:18) (18 - 18)  SpO2: 97% (03 Dec 2023 04:18) (96% - 97%)    Parameters below as of 03 Dec 2023 04:18  Patient On (Oxygen Delivery Method): room air        PHYSICAL EXAM:  GENERAL:  [ x]NAD , [x ] well appearing, [ ] Agitated, [ ] Lethargy, [ ] confused   HEAD:  [x ] Normal, [ ] Other  EYES:  [ x] EOMI, [x ] PERRLA, [ x] conjunctiva and sclera clear normal, [ ] Other  ENMT:  [ x] Normal, [x ] Moist mucous membranes, [ ] Good dentition, [ ] No lesions  NERVOUS SYSTEM:  [x ] Alert & Oriented X3, [ ]Confusion [ ] Nonfocal [ ] Encephalopathic [ ] Sedated [ ] Other-  CHEST/LUNG:  [x] Clear to auscultation bilaterally,[ x] No rales,[x ] No rhonchi [x ]  No wheezing  HEART:  [x]Regular rate and rhythm [ ] irregular [x] No murmurs, rubs, or gallops, [ ] PPM in place (Mfr:  )  ABDOMEN:  [x ] Soft, [ x] mildly TTP, [x ] mildly distended; [ ]No mass, [x ] Bowel sounds present, [ ] obese  EXTREMITIES: [ ] 2+ Peripheral Pulses, No clubbing, cyanosis,  [x] edema  LYMPH: No lymphadenopathy noted  SKIN:  [x ] No rashes or lesions, [ ] Stasis skin changes    LABS:                        14.5   6.00  )-----------( 211      ( 03 Dec 2023 06:40 )             44.5     12-03    142  |  103  |  6<L>  ----------------------------<  96  3.9   |  34<H>  |  0.76    Ca    9.3      03 Dec 2023 06:40  Phos  3.5     12-02  Mg     2.0     12-02      PT/INR - ( 03 Dec 2023 06:40 )   PT: 11.1 sec;   INR: 0.95 ratio         PTT - ( 03 Dec 2023 06:40 )  PTT:34.8 sec  Urinalysis Basic - ( 03 Dec 2023 06:40 )    Color: x / Appearance: x / SG: x / pH: x  Gluc: 96 mg/dL / Ketone: x  / Bili: x / Urobili: x   Blood: x / Protein: x / Nitrite: x   Leuk Esterase: x / RBC: x / WBC x   Sq Epi: x / Non Sq Epi: x / Bacteria: x      CAPILLARY BLOOD GLUCOSE          RADIOLOGY & ADDITIONAL STUDIES:    EK/29: 93 BPM Normal sinus rhythm. Pulmonary disease pattern    Chest XR  : IMPRESSION:  1. Shallow inspiratory effort with the patient rotated however the   visualized lungs are clear with no infiltrates, pleural effusions or CHF.  2. No free air seen beneath the diaphragm.    Abdominal XR :   IMPRESSION: Nonspecific bowel gas pattern      CT AP with oral contrast : Mid small bowel is mildly distended and fecalized with gradual transition   and tortuosity adjacent to umbilical hernia mesh. Distal small bowel is   collapsed. Findings are concerning for small bowel obstruction.    Portion of the mesh is not fully apposed to the abdominal wall and is   seen intervening between small bowel loops, for example on image 69   series 601 and image 75 series 2. Adhesive disease is likely present.  Trace ascites. Coronary artery calcifications.    XR Small bowel series :  precontrast abdominal radiographs demonstrate the presence of   enteric tube terminating within the stomach as well as residual contrast   within the large bowel. Unremarkable bowel gas pattern within the limits   of radiography. L2-L3 posterior fusion hardware is noted.    After administration of contrast via the enteric tube, contrast is noted   to reach the terminal ileum and likely the cecum, distal to the level of   obstruction identified on comparison CT, within 2 hours.    Lives: at home with wife  ADLs: independent  Diet: No restrictions  Occupation: retired  Alcohol Use: 3-4 beers/week  Tobacco Use: none  Recreational Drug Use: none   Patient is a 63y old  Male with PMH HLD. sleep apnea, asthma, Barretts esophagus, SCCs and melanomas who presents with a chief complaint of SBO (02 Dec 2023 12:38).     HPI:  Patient is a 63y old  Male with PMH HLD. sleep apnea, asthma, Barretts esophagus, SCCs and melanomas who presents with a chief complaint of SBO. Patient has a history of multiple SBOs in the past s/p umbilical hernia repair about 15 years ago with mesh (mesh type was recalled). On  AM, patient started having dark brown vomiting with no other accompanying sx.   Patient denied fever, chills, SOB, chest pain, diarrhea. Each time he gets SBO, he has this same presentation and is treated with NG tube.  Here he was treated with NG tube. NG tube was removed on  with no complications. He was admitted to the surgery team to plan for enterolysis. Of note, patient is a former [email or web address]  who worked at the site of [facility] after . Receives regular physicals and pulm follow up through [facility] services. Denies  any cardiac history.       Patient seen and examined at bedside. Reports resolution of abd pain, n/v.     PAST MEDICAL & SURGICAL HISTORY:  Hyperlipidemia      Sleep apnea  CPAP      Asthma  [employer] responder--followed by [facility] monitoring center      History of Thomson's esophagus      GERD (gastroesophageal reflux disease)      Arthritis      H/O squamous cell carcinoma excision  left hand      H/O melanoma excision  Left thigh      S/P lumbar fusion  L3-L4  ---      S/P inguinal hernia repair  right--      H/O umbilical hernia repair        S/P total knee replacement, right  2018      H/O cardiac catheterization  7-8 yr ago---normal          REVIEW OF SYSTEMS:  CONSTITUTIONAL: No fever,No chills  EYES: No eye pain, visual disturbances, or discharge  ENMT:  No ear pain No sinus or throat pain  NECK: No pain or stiffness  RESPIRATORY: No cough, wheezing, hemoptysis; No shortness of breath  CARDIOVASCULAR: No chest pain, No palpitations,  GASTROINTESTINAL: No abdominal  pain. No nausea, Vomiting resolved, No hematemesis; No diarrhea or constipation. No melena, No BRBPR  GENITOURINARY: No dysuria,No Urgency, No frequency, No hematuria, No incontinence  NEUROLOGICAL: No headaches, No numbness,  SKIN: No itching, burning   PSYCHIATRIC: No depression, anxiety        MEDICATIONS  (STANDING):  heparin   Injectable 5000 Unit(s) SubCutaneous every 8 hours  influenza  Vaccine (HIGH DOSE) 0.7 milliLiter(s) IntraMuscular once  lactated ringers. 1000 milliLiter(s) (75 mL/Hr) IV Continuous <Continuous>  pantoprazole  Injectable 40 milliGRAM(s) IV Push daily    MEDICATIONS  (PRN):  albuterol    90 MICROgram(s) HFA Inhaler 1 Puff(s) Inhalation every 6 hours PRN Shortness of Breath and/or Wheezing      Allergies    No Known Allergies    Intolerances        SOCIAL HISTORY: Denies smoking, illicit drug use    FAMILY HISTORY:  Family history of hyperlipidemia (Mother)        Vital Signs Last 24 Hrs  T(C): 36.6 (03 Dec 2023 04:18), Max: 36.6 (02 Dec 2023 11:13)  T(F): 97.8 (03 Dec 2023 04:18), Max: 97.9 (02 Dec 2023 11:13)  HR: 61 (03 Dec 2023 04:18) (61 - 67)  BP: 166/86 (03 Dec 2023 04:18) (145/85 - 166/86)  BP(mean): --  RR: 18 (03 Dec 2023 04:18) (18 - 18)  SpO2: 97% (03 Dec 2023 04:18) (96% - 97%)    Parameters below as of 03 Dec 2023 04:18  Patient On (Oxygen Delivery Method): room air        PHYSICAL EXAM:  GENERAL:  [ x]NAD , [x ] well appearing, [ ] Agitated, [ ] Lethargy, [ ] confused   HEAD:  [x ] Normal, [ ] Other  EYES:  [ x] EOMI, [x ] PERRLA, [ x] conjunctiva and sclera clear normal, [ ] Other  ENMT:  [ x] Normal, [x ] Moist mucous membranes, [ ] Good dentition, [ ] No lesions  NERVOUS SYSTEM:  [x ] Alert & Oriented X3, [ ]Confusion [ ] Nonfocal [ ] Encephalopathic [ ] Sedated [ ] Other-  CHEST/LUNG:  [x] Clear to auscultation bilaterally,[ x] No rales,[x ] No rhonchi [x ]  No wheezing  HEART:  [x]Regular rate and rhythm [ ] irregular [x] No murmurs, rubs, or gallops, [ ] PPM in place (Mfr:  )  ABDOMEN:  [x ] Soft, [ x] mildly TTP, [x ] mildly distended; [ ]No mass, [x ] Bowel sounds present, [ ] obese  EXTREMITIES: [ ] 2+ Peripheral Pulses, No clubbing, cyanosis,  [x] edema  LYMPH: No lymphadenopathy noted  SKIN:  [x ] No rashes or lesions, [ ] Stasis skin changes    LABS:                        14.5   6.00  )-----------( 211      ( 03 Dec 2023 06:40 )             44.5     12-03    142  |  103  |  6<L>  ----------------------------<  96  3.9   |  34<H>  |  0.76    Ca    9.3      03 Dec 2023 06:40  Phos  3.5     12-02  Mg     2.0     12-02      PT/INR - ( 03 Dec 2023 06:40 )   PT: 11.1 sec;   INR: 0.95 ratio         PTT - ( 03 Dec 2023 06:40 )  PTT:34.8 sec  Urinalysis Basic - ( 03 Dec 2023 06:40 )    Color: x / Appearance: x / SG: x / pH: x  Gluc: 96 mg/dL / Ketone: x  / Bili: x / Urobili: x   Blood: x / Protein: x / Nitrite: x   Leuk Esterase: x / RBC: x / WBC x   Sq Epi: x / Non Sq Epi: x / Bacteria: x      CAPILLARY BLOOD GLUCOSE          RADIOLOGY & ADDITIONAL STUDIES:    EK/29: 93 BPM Normal sinus rhythm. Pulmonary disease pattern    Chest XR  : IMPRESSION:  1. Shallow inspiratory effort with the patient rotated however the   visualized lungs are clear with no infiltrates, pleural effusions or CHF.  2. No free air seen beneath the diaphragm.    Abdominal XR :   IMPRESSION: Nonspecific bowel gas pattern      CT AP with oral contrast : Mid small bowel is mildly distended and fecalized with gradual transition   and tortuosity adjacent to umbilical hernia mesh. Distal small bowel is   collapsed. Findings are concerning for small bowel obstruction.    Portion of the mesh is not fully apposed to the abdominal wall and is   seen intervening between small bowel loops, for example on image 69   series 601 and image 75 series 2. Adhesive disease is likely present.  Trace ascites. Coronary artery calcifications.    XR Small bowel series :  precontrast abdominal radiographs demonstrate the presence of   enteric tube terminating within the stomach as well as residual contrast   within the large bowel. Unremarkable bowel gas pattern within the limits   of radiography. L2-L3 posterior fusion hardware is noted.    After administration of contrast via the enteric tube, contrast is noted   to reach the terminal ileum and likely the cecum, distal to the level of   obstruction identified on comparison CT, within 2 hours.    Lives: at home with wife  ADLs: independent  Diet: No restrictions  Occupation: retired  Alcohol Use: 3-4 beers/week  Tobacco Use: none  Recreational Drug Use: none   Patient is a 63y old  Male with PMH HLD. sleep apnea, asthma, Barretts esophagus, SCCs and melanomas who presents with a chief complaint of SBO (02 Dec 2023 12:38).     HPI:  Patient is a 63y old  Male with PMH HLD. sleep apnea, asthma, Barretts esophagus, SCCs and melanomas who presents with a chief complaint of SBO. Patient has a history of multiple SBOs in the past s/p umbilical hernia repair about 15 years ago with mesh (mesh type was recalled). On  AM, patient started having dark brown vomiting with no other accompanying sx.   Patient denied fever, chills, SOB, chest pain, diarrhea. Each time he gets SBO, he has this same presentation and is treated with NG tube.  Here he was treated with NG tube. NG tube was removed on  with no complications. He was admitted to the surgery team to plan for enterolysis. Of note, patient is a former [employer]  who worked at the site of [facility] after . Receives regular physicals and pulm follow up through [facility] services. Denies  any cardiac history.       Patient seen and examined at bedside. Reports resolution of abd pain, n/v.     PAST MEDICAL & SURGICAL HISTORY:  Hyperlipidemia      Sleep apnea  CPAP      Asthma  [employer] responder--followed by [facility] monitoring center      History of Thomson's esophagus      GERD (gastroesophageal reflux disease)      Arthritis      H/O squamous cell carcinoma excision  left hand      H/O melanoma excision  Left thigh      S/P lumbar fusion  L3-L4  ---      S/P inguinal hernia repair  right--      H/O umbilical hernia repair        S/P total knee replacement, right  2018      H/O cardiac catheterization  7-8 yr ago---normal          REVIEW OF SYSTEMS:  CONSTITUTIONAL: No fever,No chills  EYES: No eye pain, visual disturbances, or discharge  ENMT:  No ear pain No sinus or throat pain  NECK: No pain or stiffness  RESPIRATORY: No cough, wheezing, hemoptysis; No shortness of breath  CARDIOVASCULAR: No chest pain, No palpitations,  GASTROINTESTINAL: No abdominal  pain. No nausea, Vomiting resolved, No hematemesis; No diarrhea or constipation. No melena, No BRBPR  GENITOURINARY: No dysuria,No Urgency, No frequency, No hematuria, No incontinence  NEUROLOGICAL: No headaches, No numbness,  SKIN: No itching, burning   PSYCHIATRIC: No depression, anxiety        MEDICATIONS  (STANDING):  heparin   Injectable 5000 Unit(s) SubCutaneous every 8 hours  influenza  Vaccine (HIGH DOSE) 0.7 milliLiter(s) IntraMuscular once  lactated ringers. 1000 milliLiter(s) (75 mL/Hr) IV Continuous <Continuous>  pantoprazole  Injectable 40 milliGRAM(s) IV Push daily    MEDICATIONS  (PRN):  albuterol    90 MICROgram(s) HFA Inhaler 1 Puff(s) Inhalation every 6 hours PRN Shortness of Breath and/or Wheezing      Allergies    No Known Allergies    Intolerances        SOCIAL HISTORY: Denies smoking, illicit drug use    FAMILY HISTORY:  Family history of hyperlipidemia (Mother)        Vital Signs Last 24 Hrs  T(C): 36.6 (03 Dec 2023 04:18), Max: 36.6 (02 Dec 2023 11:13)  T(F): 97.8 (03 Dec 2023 04:18), Max: 97.9 (02 Dec 2023 11:13)  HR: 61 (03 Dec 2023 04:18) (61 - 67)  BP: 166/86 (03 Dec 2023 04:18) (145/85 - 166/86)  BP(mean): --  RR: 18 (03 Dec 2023 04:18) (18 - 18)  SpO2: 97% (03 Dec 2023 04:18) (96% - 97%)    Parameters below as of 03 Dec 2023 04:18  Patient On (Oxygen Delivery Method): room air        PHYSICAL EXAM:  GENERAL:  [ x]NAD , [x ] well appearing, [ ] Agitated, [ ] Lethargy, [ ] confused   HEAD:  [x ] Normal, [ ] Other  EYES:  [ x] EOMI, [x ] PERRLA, [ x] conjunctiva and sclera clear normal, [ ] Other  ENMT:  [ x] Normal, [x ] Moist mucous membranes, [ ] Good dentition, [ ] No lesions  NERVOUS SYSTEM:  [x ] Alert & Oriented X3, [ ]Confusion [ ] Nonfocal [ ] Encephalopathic [ ] Sedated [ ] Other-  CHEST/LUNG:  [x] Clear to auscultation bilaterally,[ x] No rales,[x ] No rhonchi [x ]  No wheezing  HEART:  [x]Regular rate and rhythm [ ] irregular [x] No murmurs, rubs, or gallops, [ ] PPM in place (Mfr:  )  ABDOMEN:  [x ] Soft, [ x] mildly TTP, [x ] mildly distended; [ ]No mass, [x ] Bowel sounds present, [ ] obese  EXTREMITIES: [ ] 2+ Peripheral Pulses, No clubbing, cyanosis,  [x] edema  LYMPH: No lymphadenopathy noted  SKIN:  [x ] No rashes or lesions, [ ] Stasis skin changes    LABS:                        14.5   6.00  )-----------( 211      ( 03 Dec 2023 06:40 )             44.5     12-03    142  |  103  |  6<L>  ----------------------------<  96  3.9   |  34<H>  |  0.76    Ca    9.3      03 Dec 2023 06:40  Phos  3.5     12-02  Mg     2.0     12-02      PT/INR - ( 03 Dec 2023 06:40 )   PT: 11.1 sec;   INR: 0.95 ratio         PTT - ( 03 Dec 2023 06:40 )  PTT:34.8 sec  Urinalysis Basic - ( 03 Dec 2023 06:40 )    Color: x / Appearance: x / SG: x / pH: x  Gluc: 96 mg/dL / Ketone: x  / Bili: x / Urobili: x   Blood: x / Protein: x / Nitrite: x   Leuk Esterase: x / RBC: x / WBC x   Sq Epi: x / Non Sq Epi: x / Bacteria: x      CAPILLARY BLOOD GLUCOSE          RADIOLOGY & ADDITIONAL STUDIES:    EK/29: 93 BPM Normal sinus rhythm. Pulmonary disease pattern    Chest XR  : IMPRESSION:  1. Shallow inspiratory effort with the patient rotated however the   visualized lungs are clear with no infiltrates, pleural effusions or CHF.  2. No free air seen beneath the diaphragm.    Abdominal XR :   IMPRESSION: Nonspecific bowel gas pattern      CT AP with oral contrast : Mid small bowel is mildly distended and fecalized with gradual transition   and tortuosity adjacent to umbilical hernia mesh. Distal small bowel is   collapsed. Findings are concerning for small bowel obstruction.    Portion of the mesh is not fully apposed to the abdominal wall and is   seen intervening between small bowel loops, for example on image 69   series 601 and image 75 series 2. Adhesive disease is likely present.  Trace ascites. Coronary artery calcifications.    XR Small bowel series :  precontrast abdominal radiographs demonstrate the presence of   enteric tube terminating within the stomach as well as residual contrast   within the large bowel. Unremarkable bowel gas pattern within the limits   of radiography. L2-L3 posterior fusion hardware is noted.    After administration of contrast via the enteric tube, contrast is noted   to reach the terminal ileum and likely the cecum, distal to the level of   obstruction identified on comparison CT, within 2 hours.    Lives: at home with wife  ADLs: independent  Diet: No restrictions  Occupation: retired  Alcohol Use: 3-4 beers/week  Tobacco Use: none  Recreational Drug Use: none   Patient is a 63y old  Male with PMH HLD. sleep apnea, asthma, Barretts esophagus, SCCs and melanomas who presents with a chief complaint of SBO (02 Dec 2023 12:38).     HPI:  Patient is a 63y old  Male with PMH HLD. sleep apnea, asthma, Barretts esophagus, SCCs and melanomas who presents with a chief complaint of SBO. Patient has a history of multiple SBOs in the past s/p umbilical hernia repair about 15 years ago with mesh (mesh type was recalled). On  AM, patient started having dark brown vomiting with no other accompanying sx.   Patient denied fever, chills, SOB, chest pain, diarrhea. Each time he gets SBO, he has this same presentation and is treated with NG tube.  Here he was treated with NG tube. NG tube was removed on  with no complications. He was admitted to the surgery team to plan for enterolysis. Of note, patient is a former [email or web address]  who worked at the site of [facility] after . Receives regular physicals and pulm follow up through [facility] services. Denies  any cardiac history.       Patient seen and examined at bedside. Reports resolution of abd pain, n/v.     PAST MEDICAL & SURGICAL HISTORY:  Hyperlipidemia      Sleep apnea  CPAP      Asthma  WT responder--followed by [facility] monitoring center      History of Thomson's esophagus      GERD (gastroesophageal reflux disease)      Arthritis      H/O squamous cell carcinoma excision  left hand      H/O melanoma excision  Left thigh      S/P lumbar fusion  L3-L4  ---      S/P inguinal hernia repair  right--      H/O umbilical hernia repair        S/P total knee replacement, right  2018      H/O cardiac catheterization  7-8 yr ago---normal          REVIEW OF SYSTEMS:  CONSTITUTIONAL: No fever,No chills  EYES: No eye pain, visual disturbances, or discharge  ENMT:  No ear pain No sinus or throat pain  NECK: No pain or stiffness  RESPIRATORY: No cough, wheezing, hemoptysis; No shortness of breath  CARDIOVASCULAR: No chest pain, No palpitations,  GASTROINTESTINAL: No abdominal  pain. No nausea, Vomiting resolved, No hematemesis; No diarrhea or constipation. No melena, No BRBPR  GENITOURINARY: No dysuria,No Urgency, No frequency, No hematuria, No incontinence  NEUROLOGICAL: No headaches, No numbness,  SKIN: No itching, burning   PSYCHIATRIC: No depression, anxiety        MEDICATIONS  (STANDING):  heparin   Injectable 5000 Unit(s) SubCutaneous every 8 hours  influenza  Vaccine (HIGH DOSE) 0.7 milliLiter(s) IntraMuscular once  lactated ringers. 1000 milliLiter(s) (75 mL/Hr) IV Continuous <Continuous>  pantoprazole  Injectable 40 milliGRAM(s) IV Push daily    MEDICATIONS  (PRN):  albuterol    90 MICROgram(s) HFA Inhaler 1 Puff(s) Inhalation every 6 hours PRN Shortness of Breath and/or Wheezing      Allergies    No Known Allergies    Intolerances        SOCIAL HISTORY: Denies smoking, illicit drug use    FAMILY HISTORY:  Family history of hyperlipidemia (Mother)        Vital Signs Last 24 Hrs  T(C): 36.6 (03 Dec 2023 04:18), Max: 36.6 (02 Dec 2023 11:13)  T(F): 97.8 (03 Dec 2023 04:18), Max: 97.9 (02 Dec 2023 11:13)  HR: 61 (03 Dec 2023 04:18) (61 - 67)  BP: 166/86 (03 Dec 2023 04:18) (145/85 - 166/86)  BP(mean): --  RR: 18 (03 Dec 2023 04:18) (18 - 18)  SpO2: 97% (03 Dec 2023 04:18) (96% - 97%)    Parameters below as of 03 Dec 2023 04:18  Patient On (Oxygen Delivery Method): room air        PHYSICAL EXAM:  GENERAL:  [ x]NAD , [x ] well appearing, [ ] Agitated, [ ] Lethargy, [ ] confused   HEAD:  [x ] Normal, [ ] Other  EYES:  [ x] EOMI, [x ] PERRLA, [ x] conjunctiva and sclera clear normal, [ ] Other  ENMT:  [ x] Normal, [x ] Moist mucous membranes, [ ] Good dentition, [ ] No lesions  NERVOUS SYSTEM:  [x ] Alert & Oriented X3, [ ]Confusion [ ] Nonfocal [ ] Encephalopathic [ ] Sedated [ ] Other-  CHEST/LUNG:  [x] Clear to auscultation bilaterally,[ x] No rales,[x ] No rhonchi [x ]  No wheezing  HEART:  [x]Regular rate and rhythm [ ] irregular [x] No murmurs, rubs, or gallops, [ ] PPM in place (Mfr:  )  ABDOMEN:  [x ] Soft, [ x] mildly TTP, [x ] mildly distended; [ ]No mass, [x ] Bowel sounds present, [ ] obese  EXTREMITIES: [ ] 2+ Peripheral Pulses, No clubbing, cyanosis,  [x] edema  LYMPH: No lymphadenopathy noted  SKIN:  [x ] No rashes or lesions, [ ] Stasis skin changes    LABS:                        14.5   6.00  )-----------( 211      ( 03 Dec 2023 06:40 )             44.5     12-03    142  |  103  |  6<L>  ----------------------------<  96  3.9   |  34<H>  |  0.76    Ca    9.3      03 Dec 2023 06:40  Phos  3.5     12-02  Mg     2.0     12-02      PT/INR - ( 03 Dec 2023 06:40 )   PT: 11.1 sec;   INR: 0.95 ratio         PTT - ( 03 Dec 2023 06:40 )  PTT:34.8 sec  Urinalysis Basic - ( 03 Dec 2023 06:40 )    Color: x / Appearance: x / SG: x / pH: x  Gluc: 96 mg/dL / Ketone: x  / Bili: x / Urobili: x   Blood: x / Protein: x / Nitrite: x   Leuk Esterase: x / RBC: x / WBC x   Sq Epi: x / Non Sq Epi: x / Bacteria: x      CAPILLARY BLOOD GLUCOSE          RADIOLOGY & ADDITIONAL STUDIES:    EK/29: 93 BPM Normal sinus rhythm. Pulmonary disease pattern    Chest XR  : IMPRESSION:  1. Shallow inspiratory effort with the patient rotated however the   visualized lungs are clear with no infiltrates, pleural effusions or CHF.  2. No free air seen beneath the diaphragm.    Abdominal XR :   IMPRESSION: Nonspecific bowel gas pattern      CT AP with oral contrast : Mid small bowel is mildly distended and fecalized with gradual transition   and tortuosity adjacent to umbilical hernia mesh. Distal small bowel is   collapsed. Findings are concerning for small bowel obstruction.    Portion of the mesh is not fully apposed to the abdominal wall and is   seen intervening between small bowel loops, for example on image 69   series 601 and image 75 series 2. Adhesive disease is likely present.  Trace ascites. Coronary artery calcifications.    XR Small bowel series :  precontrast abdominal radiographs demonstrate the presence of   enteric tube terminating within the stomach as well as residual contrast   within the large bowel. Unremarkable bowel gas pattern within the limits   of radiography. L2-L3 posterior fusion hardware is noted.    After administration of contrast via the enteric tube, contrast is noted   to reach the terminal ileum and likely the cecum, distal to the level of   obstruction identified on comparison CT, within 2 hours.    Lives: at home with wife  ADLs: independent  Diet: No restrictions  Occupation: retired  Alcohol Use: 3-4 beers/week  Tobacco Use: none  Recreational Drug Use: none

## 2023-12-03 NOTE — CONSULT NOTE ADULT - PROBLEM SELECTOR RECOMMENDATION 8
- since significant weight loss this year, he is mostly asx and uses CPAP rarely   - BPs have been elevated here, he states likely 2/2 anxiety before procedure  - O2 sats within NL, no desats noted   - consider encouragement of CPAP if blood pressures remain elevated

## 2023-12-03 NOTE — CONSULT NOTE ADULT - PROBLEM SELECTOR RECOMMENDATION 9
- s/p umbilical hernia about 15 years ago, has had h/o multiple SBOs all tx with NG tube, now electing enterolysis to be performed on 12/4.   - Admitted by Dr. Blum  - Currently feeling good, asx , s/p NG tube   - continue diet per surgery - DVT ppx per surgery - s/p umbilical hernia about 15 years ago, has had h/o multiple SBOs all tx with NG tube, now electing enterolysis to be performed on 12/4.   - Admitted by Dr. Blum  - Symptoms resolved, s/p NGT   -Tolerating diet. Having BM  - continue diet per surgery

## 2023-12-03 NOTE — CONSULT NOTE ADULT - ASSESSMENT
Patient is a 63y old  Male with PMH HLD. sleep apnea, asthma, Barretts esophagus, SCCs and melanomas who presents with a chief complaint of SBO Patient is a 63y old  Male with PMH HLD. sleep apnea, asthma, Barretts esophagus, SCCs and melanomas who presents with a chief complaint of SBO  Hospitalist consult for medical clearance for OR 12/4 - Dislodged mesh with recurrent obstruction.

## 2023-12-04 ENCOUNTER — APPOINTMENT (OUTPATIENT)
Dept: ORTHOPEDIC SURGERY | Facility: CLINIC | Age: 63
End: 2023-12-04

## 2023-12-04 ENCOUNTER — RESULT REVIEW (OUTPATIENT)
Age: 63
End: 2023-12-04

## 2023-12-04 LAB
ANION GAP SERPL CALC-SCNC: 3 MMOL/L — LOW (ref 5–17)
ANION GAP SERPL CALC-SCNC: 3 MMOL/L — LOW (ref 5–17)
BUN SERPL-MCNC: 5 MG/DL — LOW (ref 7–23)
BUN SERPL-MCNC: 5 MG/DL — LOW (ref 7–23)
CALCIUM SERPL-MCNC: 9.1 MG/DL — SIGNIFICANT CHANGE UP (ref 8.5–10.1)
CALCIUM SERPL-MCNC: 9.1 MG/DL — SIGNIFICANT CHANGE UP (ref 8.5–10.1)
CHLORIDE SERPL-SCNC: 104 MMOL/L — SIGNIFICANT CHANGE UP (ref 96–108)
CHLORIDE SERPL-SCNC: 104 MMOL/L — SIGNIFICANT CHANGE UP (ref 96–108)
CO2 SERPL-SCNC: 33 MMOL/L — HIGH (ref 22–31)
CO2 SERPL-SCNC: 33 MMOL/L — HIGH (ref 22–31)
CREAT SERPL-MCNC: 0.72 MG/DL — SIGNIFICANT CHANGE UP (ref 0.5–1.3)
CREAT SERPL-MCNC: 0.72 MG/DL — SIGNIFICANT CHANGE UP (ref 0.5–1.3)
EGFR: 101 ML/MIN/1.73M2 — SIGNIFICANT CHANGE UP
EGFR: 101 ML/MIN/1.73M2 — SIGNIFICANT CHANGE UP
GLUCOSE SERPL-MCNC: 101 MG/DL — HIGH (ref 70–99)
GLUCOSE SERPL-MCNC: 101 MG/DL — HIGH (ref 70–99)
HCT VFR BLD CALC: 40.9 % — SIGNIFICANT CHANGE UP (ref 39–50)
HCT VFR BLD CALC: 40.9 % — SIGNIFICANT CHANGE UP (ref 39–50)
HGB BLD-MCNC: 13.9 G/DL — SIGNIFICANT CHANGE UP (ref 13–17)
HGB BLD-MCNC: 13.9 G/DL — SIGNIFICANT CHANGE UP (ref 13–17)
MAGNESIUM SERPL-MCNC: 1.8 MG/DL — SIGNIFICANT CHANGE UP (ref 1.6–2.6)
MAGNESIUM SERPL-MCNC: 1.8 MG/DL — SIGNIFICANT CHANGE UP (ref 1.6–2.6)
MCHC RBC-ENTMCNC: 31.6 PG — SIGNIFICANT CHANGE UP (ref 27–34)
MCHC RBC-ENTMCNC: 31.6 PG — SIGNIFICANT CHANGE UP (ref 27–34)
MCHC RBC-ENTMCNC: 34 GM/DL — SIGNIFICANT CHANGE UP (ref 32–36)
MCHC RBC-ENTMCNC: 34 GM/DL — SIGNIFICANT CHANGE UP (ref 32–36)
MCV RBC AUTO: 93 FL — SIGNIFICANT CHANGE UP (ref 80–100)
MCV RBC AUTO: 93 FL — SIGNIFICANT CHANGE UP (ref 80–100)
NRBC # BLD: 0 /100 WBCS — SIGNIFICANT CHANGE UP (ref 0–0)
NRBC # BLD: 0 /100 WBCS — SIGNIFICANT CHANGE UP (ref 0–0)
PHOSPHATE SERPL-MCNC: 4 MG/DL — SIGNIFICANT CHANGE UP (ref 2.5–4.5)
PHOSPHATE SERPL-MCNC: 4 MG/DL — SIGNIFICANT CHANGE UP (ref 2.5–4.5)
PLATELET # BLD AUTO: 202 K/UL — SIGNIFICANT CHANGE UP (ref 150–400)
PLATELET # BLD AUTO: 202 K/UL — SIGNIFICANT CHANGE UP (ref 150–400)
POTASSIUM SERPL-MCNC: 3.9 MMOL/L — SIGNIFICANT CHANGE UP (ref 3.5–5.3)
POTASSIUM SERPL-MCNC: 3.9 MMOL/L — SIGNIFICANT CHANGE UP (ref 3.5–5.3)
POTASSIUM SERPL-SCNC: 3.9 MMOL/L — SIGNIFICANT CHANGE UP (ref 3.5–5.3)
POTASSIUM SERPL-SCNC: 3.9 MMOL/L — SIGNIFICANT CHANGE UP (ref 3.5–5.3)
RBC # BLD: 4.4 M/UL — SIGNIFICANT CHANGE UP (ref 4.2–5.8)
RBC # BLD: 4.4 M/UL — SIGNIFICANT CHANGE UP (ref 4.2–5.8)
RBC # FLD: 12 % — SIGNIFICANT CHANGE UP (ref 10.3–14.5)
RBC # FLD: 12 % — SIGNIFICANT CHANGE UP (ref 10.3–14.5)
SODIUM SERPL-SCNC: 140 MMOL/L — SIGNIFICANT CHANGE UP (ref 135–145)
SODIUM SERPL-SCNC: 140 MMOL/L — SIGNIFICANT CHANGE UP (ref 135–145)
WBC # BLD: 5.26 K/UL — SIGNIFICANT CHANGE UP (ref 3.8–10.5)
WBC # BLD: 5.26 K/UL — SIGNIFICANT CHANGE UP (ref 3.8–10.5)
WBC # FLD AUTO: 5.26 K/UL — SIGNIFICANT CHANGE UP (ref 3.8–10.5)
WBC # FLD AUTO: 5.26 K/UL — SIGNIFICANT CHANGE UP (ref 3.8–10.5)

## 2023-12-04 PROCEDURE — 49623 RMVL NINFCT MESH HERNIA RPR: CPT

## 2023-12-04 PROCEDURE — 49623 RMVL NINFCT MESH HERNIA RPR: CPT | Mod: 80

## 2023-12-04 PROCEDURE — 49614 RPR AA HRN RCR < 3 NCR/STRN: CPT

## 2023-12-04 PROCEDURE — 44202 LAP ENTERECTOMY: CPT | Mod: 80,22

## 2023-12-04 PROCEDURE — 88307 TISSUE EXAM BY PATHOLOGIST: CPT | Mod: 26

## 2023-12-04 PROCEDURE — 49614 RPR AA HRN RCR < 3 NCR/STRN: CPT | Mod: 80

## 2023-12-04 PROCEDURE — 44202 LAP ENTERECTOMY: CPT | Mod: 22

## 2023-12-04 PROCEDURE — 99232 SBSQ HOSP IP/OBS MODERATE 35: CPT

## 2023-12-04 DEVICE — CLIP APPLIER COVIDIEN ENDOCLIP 10MM LARGE: Type: IMPLANTABLE DEVICE | Status: FUNCTIONAL

## 2023-12-04 DEVICE — STAPLER COVIDIEN TA 60 BLUE: Type: IMPLANTABLE DEVICE | Status: FUNCTIONAL

## 2023-12-04 DEVICE — ETHICON HERNIA SECURESTRAP 5MM SIZE 25: Type: IMPLANTABLE DEVICE | Status: FUNCTIONAL

## 2023-12-04 DEVICE — STAPLER COVIDIEN TRI-STAPLE 60MM TAN RELOAD: Type: IMPLANTABLE DEVICE | Status: FUNCTIONAL

## 2023-12-04 RX ORDER — KETOROLAC TROMETHAMINE 30 MG/ML
15 SYRINGE (ML) INJECTION EVERY 6 HOURS
Refills: 0 | Status: DISCONTINUED | OUTPATIENT
Start: 2023-12-04 | End: 2023-12-06

## 2023-12-04 RX ORDER — SODIUM CHLORIDE 9 MG/ML
1000 INJECTION, SOLUTION INTRAVENOUS
Refills: 0 | Status: DISCONTINUED | OUTPATIENT
Start: 2023-12-04 | End: 2023-12-04

## 2023-12-04 RX ORDER — POLYETHYLENE GLYCOL 3350 17 G/17G
17 POWDER, FOR SOLUTION ORAL DAILY
Refills: 0 | Status: DISCONTINUED | OUTPATIENT
Start: 2023-12-04 | End: 2023-12-07

## 2023-12-04 RX ORDER — PANTOPRAZOLE SODIUM 20 MG/1
40 TABLET, DELAYED RELEASE ORAL DAILY
Refills: 0 | Status: DISCONTINUED | OUTPATIENT
Start: 2023-12-04 | End: 2023-12-07

## 2023-12-04 RX ORDER — HYDROMORPHONE HYDROCHLORIDE 2 MG/ML
0.5 INJECTION INTRAMUSCULAR; INTRAVENOUS; SUBCUTANEOUS
Refills: 0 | Status: DISCONTINUED | OUTPATIENT
Start: 2023-12-04 | End: 2023-12-04

## 2023-12-04 RX ORDER — OXYCODONE HYDROCHLORIDE 5 MG/1
5 TABLET ORAL ONCE
Refills: 0 | Status: DISCONTINUED | OUTPATIENT
Start: 2023-12-04 | End: 2023-12-04

## 2023-12-04 RX ORDER — SODIUM CHLORIDE 9 MG/ML
1000 INJECTION, SOLUTION INTRAVENOUS
Refills: 0 | Status: DISCONTINUED | OUTPATIENT
Start: 2023-12-04 | End: 2023-12-05

## 2023-12-04 RX ORDER — HEPARIN SODIUM 5000 [USP'U]/ML
5000 INJECTION INTRAVENOUS; SUBCUTANEOUS EVERY 8 HOURS
Refills: 0 | Status: DISCONTINUED | OUTPATIENT
Start: 2023-12-04 | End: 2023-12-07

## 2023-12-04 RX ORDER — ONDANSETRON 8 MG/1
4 TABLET, FILM COATED ORAL ONCE
Refills: 0 | Status: DISCONTINUED | OUTPATIENT
Start: 2023-12-04 | End: 2023-12-04

## 2023-12-04 RX ORDER — ACETAMINOPHEN 500 MG
1000 TABLET ORAL EVERY 6 HOURS
Refills: 0 | Status: COMPLETED | OUTPATIENT
Start: 2023-12-04 | End: 2023-12-05

## 2023-12-04 RX ORDER — OXYCODONE HYDROCHLORIDE 5 MG/1
10 TABLET ORAL EVERY 4 HOURS
Refills: 0 | Status: DISCONTINUED | OUTPATIENT
Start: 2023-12-04 | End: 2023-12-07

## 2023-12-04 RX ORDER — ALBUTEROL 90 UG/1
1 AEROSOL, METERED ORAL EVERY 6 HOURS
Refills: 0 | Status: DISCONTINUED | OUTPATIENT
Start: 2023-12-04 | End: 2023-12-07

## 2023-12-04 RX ORDER — OXYCODONE HYDROCHLORIDE 5 MG/1
5 TABLET ORAL EVERY 4 HOURS
Refills: 0 | Status: DISCONTINUED | OUTPATIENT
Start: 2023-12-04 | End: 2023-12-07

## 2023-12-04 RX ADMIN — HYDROMORPHONE HYDROCHLORIDE 0.5 MILLIGRAM(S): 2 INJECTION INTRAMUSCULAR; INTRAVENOUS; SUBCUTANEOUS at 17:00

## 2023-12-04 RX ADMIN — Medication 400 MILLIGRAM(S): at 20:02

## 2023-12-04 RX ADMIN — HYDROMORPHONE HYDROCHLORIDE 0.5 MILLIGRAM(S): 2 INJECTION INTRAMUSCULAR; INTRAVENOUS; SUBCUTANEOUS at 16:34

## 2023-12-04 RX ADMIN — SODIUM CHLORIDE 50 MILLILITER(S): 9 INJECTION, SOLUTION INTRAVENOUS at 20:02

## 2023-12-04 RX ADMIN — Medication 15 MILLIGRAM(S): at 17:45

## 2023-12-04 RX ADMIN — HYDROMORPHONE HYDROCHLORIDE 0.5 MILLIGRAM(S): 2 INJECTION INTRAMUSCULAR; INTRAVENOUS; SUBCUTANEOUS at 17:30

## 2023-12-04 RX ADMIN — Medication 15 MILLIGRAM(S): at 17:30

## 2023-12-04 RX ADMIN — HYDROMORPHONE HYDROCHLORIDE 0.5 MILLIGRAM(S): 2 INJECTION INTRAMUSCULAR; INTRAVENOUS; SUBCUTANEOUS at 17:45

## 2023-12-04 RX ADMIN — HYDROMORPHONE HYDROCHLORIDE 0.5 MILLIGRAM(S): 2 INJECTION INTRAMUSCULAR; INTRAVENOUS; SUBCUTANEOUS at 16:19

## 2023-12-04 RX ADMIN — Medication 15 MILLIGRAM(S): at 23:26

## 2023-12-04 RX ADMIN — Medication 1000 MILLIGRAM(S): at 21:02

## 2023-12-04 NOTE — PROGRESS NOTE ADULT - TIME BILLING
Reviewing chart notes and data, face to face time counseling the patient, coordinating care with SW/CM at [facility]. Reviewing chart notes and data, face to face time counseling the patient, coordinating care with SW/CM at [facility].
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
## 2023-12-04 NOTE — CHART NOTE - NSCHARTNOTEFT_GEN_A_CORE
Post Operative Note  Patient: IVAN BULL 63y (1960) Male   MRN: [id number]  Location: [facility] 2EAS 214 W1  Visit: 11-29-23 Inpatient  Date: 12-04-23 @ 22:53    Procedure: S/P JENNIFER with small bowel resection and primary anastomosis     Subjective:   Patient seen and examined at bedside, reports tolerating diet, ambulating.  Patient denies dizziness, chest pain, sob, nausea, vomiting, abdominal pain, or urinary complaints.    Objective:  Vitals: T(F): 98.2 (12-04-23 @ 20:14), Max: 98.2 (12-04-23 @ 20:14)  HR: 88 (12-04-23 @ 20:14)  BP: 118/82 (12-04-23 @ 20:14) (118/82 - 157/90)  RR: 18 (12-04-23 @ 20:14)  SpO2: 92% (12-04-23 @ 20:14)  Vent Settings:     In:   12-04-23 @ 07:01  -  12-04-23 @ 22:53  --------------------------------------------------------  IN: 720 mL      IV Fluids: lactated ringers. 1000 milliLiter(s) (50 mL/Hr) IV Continuous <Continuous>      Out:   12-04-23 @ 07:01  -  12-04-23 @ 22:53  --------------------------------------------------------  OUT: 300 mL      EBL:     Voided Urine:   12-04-23 @ 07:01  -  12-04-23 @ 22:53  --------------------------------------------------------  OUT: 300 mL      Brown Catheter: yes          GENERAL:  Well-nourished, well-developed Male lying comfortably in bed in NAD.  HEENT:  NC/AT. Sclera white. Mucous membranes moist.  CARDIO:  Regular rate and rhythm.  No murmur, gallop or rub appreciated.  RESPIRATORY:  Nonlabored breathing, no accessory muscle use. Lungs clear to auscultation bilaterally.  No wheezing, rales or rhonchi appreciated.  ABDOMEN:  Soft, nondistended, nontender. BS appreciated on auscultation.  No rebound tenderness or guarding.  EXTREMITIES: No calf tenderness bilaterally.  SKIN:  No jaundice, pallor, or cyanosis  NEURO:  A&O x 3  Surgical dressings clean, dry, intact.    Medications: [Standing]  acetaminophen   IVPB .. 1000 milliGRAM(s) IV Intermittent every 6 hours  albuterol    90 MICROgram(s) HFA Inhaler 1 Puff(s) Inhalation every 6 hours PRN  heparin   Injectable 5000 Unit(s) SubCutaneous every 8 hours  influenza  Vaccine (HIGH DOSE) 0.7 milliLiter(s) IntraMuscular once  ketorolac   Injectable 15 milliGRAM(s) IV Push every 6 hours  lactated ringers. 1000 milliLiter(s) IV Continuous <Continuous>  oxyCODONE    IR 5 milliGRAM(s) Oral every 4 hours PRN  oxyCODONE    IR 10 milliGRAM(s) Oral every 4 hours PRN  pantoprazole  Injectable 40 milliGRAM(s) IV Push daily  polyethylene glycol 3350 17 Gram(s) Oral daily    Medications: [PRN]  acetaminophen   IVPB .. 1000 milliGRAM(s) IV Intermittent every 6 hours  albuterol    90 MICROgram(s) HFA Inhaler 1 Puff(s) Inhalation every 6 hours PRN  heparin   Injectable 5000 Unit(s) SubCutaneous every 8 hours  influenza  Vaccine (HIGH DOSE) 0.7 milliLiter(s) IntraMuscular once  ketorolac   Injectable 15 milliGRAM(s) IV Push every 6 hours  lactated ringers. 1000 milliLiter(s) IV Continuous <Continuous>  oxyCODONE    IR 5 milliGRAM(s) Oral every 4 hours PRN  oxyCODONE    IR 10 milliGRAM(s) Oral every 4 hours PRN  pantoprazole  Injectable 40 milliGRAM(s) IV Push daily  polyethylene glycol 3350 17 Gram(s) Oral daily    Labs:                        13.9   5.26  )-----------( 202      ( 04 Dec 2023 08:12 )             40.9     12-04    140  |  104  |  5<L>  ----------------------------<  101<H>  3.9   |  33<H>  |  0.72    Ca    9.1      04 Dec 2023 08:12  Phos  4.0     12-04  Mg     1.8     12-04      PT/INR - ( 03 Dec 2023 06:40 )   PT: 11.1 sec;   INR: 0.95 ratio         PTT - ( 03 Dec 2023 06:40 )  PTT:34.8 sec      Imaging:  No post-op imaging studies    Assessment:  63yMale patient S/P JENNIFER with small bowel resection and primary anastomosis for SBO    Plan:  - Continue clear diet, Advance in am  - Pain control  - Zofran PRN  - Incentive spirometry  - Encourage ambulation  - SCDs  - DVT prophylaxis with LVX to be started tomorrow  - Follow up AM labs  - Will continue to monitor Post Operative Note  Patient: IVAN BULL 63y (1960) Male   MRN: [id number]  Location: [facility] 2EAS 214 W1  Visit: 11-29-23 Inpatient  Date: 12-04-23 @ 22:53    Procedure: S/P JENNIFER with small bowel resection and primary anastomosis     Subjective:   Patient seen and examined at bedside, reports tolerating diet, ambulating.  Patient denies dizziness, chest pain, sob, nausea, vomiting, abdominal pain, or urinary complaints.    Objective:  Vitals: T(F): 98.2 (12-04-23 @ 20:14), Max: 98.2 (12-04-23 @ 20:14)  HR: 88 (12-04-23 @ 20:14)  BP: 118/82 (12-04-23 @ 20:14) (118/82 - 157/90)  RR: 18 (12-04-23 @ 20:14)  SpO2: 92% (12-04-23 @ 20:14)  Vent Settings:     In:   12-04-23 @ 07:01  -  12-04-23 @ 22:53  --------------------------------------------------------  IN: 720 mL      IV Fluids: lactated ringers. 1000 milliLiter(s) (50 mL/Hr) IV Continuous <Continuous>      Out:   12-04-23 @ 07:01  -  12-04-23 @ 22:53  --------------------------------------------------------  OUT: 300 mL      EBL:     Voided Urine:   12-04-23 @ 07:01  -  12-04-23 @ 22:53  --------------------------------------------------------  OUT: 300 mL      Brown Catheter: yes          GENERAL:  Well-nourished, well-developed Male lying comfortably in bed in NAD.  HEENT:  NC/AT. Sclera white. Mucous membranes moist.  CARDIO:  Regular rate and rhythm.  No murmur, gallop or rub appreciated.  RESPIRATORY:  Nonlabored breathing, no accessory muscle use. Lungs clear to auscultation bilaterally.  No wheezing, rales or rhonchi appreciated.  ABDOMEN:  Soft, nondistended, nontender. BS appreciated on auscultation.  No rebound tenderness or guarding.  EXTREMITIES: No calf tenderness bilaterally.  SKIN:  No jaundice, pallor, or cyanosis  NEURO:  A&O x 3  Surgical dressings clean, dry, intact.    Medications: [Standing]  acetaminophen   IVPB .. 1000 milliGRAM(s) IV Intermittent every 6 hours  albuterol    90 MICROgram(s) HFA Inhaler 1 Puff(s) Inhalation every 6 hours PRN  heparin   Injectable 5000 Unit(s) SubCutaneous every 8 hours  influenza  Vaccine (HIGH DOSE) 0.7 milliLiter(s) IntraMuscular once  ketorolac   Injectable 15 milliGRAM(s) IV Push every 6 hours  lactated ringers. 1000 milliLiter(s) IV Continuous <Continuous>  oxyCODONE    IR 5 milliGRAM(s) Oral every 4 hours PRN  oxyCODONE    IR 10 milliGRAM(s) Oral every 4 hours PRN  pantoprazole  Injectable 40 milliGRAM(s) IV Push daily  polyethylene glycol 3350 17 Gram(s) Oral daily    Medications: [PRN]  acetaminophen   IVPB .. 1000 milliGRAM(s) IV Intermittent every 6 hours  albuterol    90 MICROgram(s) HFA Inhaler 1 Puff(s) Inhalation every 6 hours PRN  heparin   Injectable 5000 Unit(s) SubCutaneous every 8 hours  influenza  Vaccine (HIGH DOSE) 0.7 milliLiter(s) IntraMuscular once  ketorolac   Injectable 15 milliGRAM(s) IV Push every 6 hours  lactated ringers. 1000 milliLiter(s) IV Continuous <Continuous>  oxyCODONE    IR 5 milliGRAM(s) Oral every 4 hours PRN  oxyCODONE    IR 10 milliGRAM(s) Oral every 4 hours PRN  pantoprazole  Injectable 40 milliGRAM(s) IV Push daily  polyethylene glycol 3350 17 Gram(s) Oral daily    Labs:                        13.9   5.26  )-----------( 202      ( 04 Dec 2023 08:12 )             40.9     12-04    140  |  104  |  5<L>  ----------------------------<  101<H>  3.9   |  33<H>  |  0.72    Ca    9.1      04 Dec 2023 08:12  Phos  4.0     12-04  Mg     1.8     12-04      PT/INR - ( 03 Dec 2023 06:40 )   PT: 11.1 sec;   INR: 0.95 ratio         PTT - ( 03 Dec 2023 06:40 )  PTT:34.8 sec      Imaging:  No post-op imaging studies    Assessment:  63yMale patient S/P JENNIFER with small bowel resection and primary anastomosis for SBO    Plan:  - Continue clear diet, Advance in am  - Pain control  - Zofran PRN  - Incentive spirometry  - Encourage ambulation  - SCDs  - DVT prophylaxis with LVX to be started tomorrow  - Follow up AM labs  - Will continue to monitor Post Operative Note  Patient: IVAN BULL 63y (1960) Male   MRN: [id number]  Location: [facility] 2EAS 214 W1  Visit: 11-29-23 Inpatient  Date: 12-04-23 @ 22:53    Procedure: S/P JENNIFER with small bowel resection and primary anastomosis     Subjective:   Patient seen and examined at bedside, reports tolerating diet, ambulating.  Patient denies dizziness, chest pain, sob, nausea, vomiting, abdominal pain, or urinary complaints.    Objective:  Vitals: T(F): 98.2 (12-04-23 @ 20:14), Max: 98.2 (12-04-23 @ 20:14)  HR: 88 (12-04-23 @ 20:14)  BP: 118/82 (12-04-23 @ 20:14) (118/82 - 157/90)  RR: 18 (12-04-23 @ 20:14)  SpO2: 92% (12-04-23 @ 20:14)  Vent Settings:     In:   12-04-23 @ 07:01  -  12-04-23 @ 22:53  --------------------------------------------------------  IN: 720 mL      IV Fluids: lactated ringers. 1000 milliLiter(s) (50 mL/Hr) IV Continuous <Continuous>      Out:   12-04-23 @ 07:01  -  12-04-23 @ 22:53  --------------------------------------------------------  OUT: 300 mL      EBL:     Voided Urine:   12-04-23 @ 07:01  -  12-04-23 @ 22:53  --------------------------------------------------------  OUT: 300 mL      Brown Catheter: yes          GENERAL:  Well-nourished, well-developed Male lying comfortably in bed in NAD.  HEENT:  NC/AT. Sclera white. Mucous membranes moist.  CARDIO:  Regular rate and rhythm.  No murmur, gallop or rub appreciated.  RESPIRATORY:  Nonlabored breathing, no accessory muscle use. Lungs clear to auscultation bilaterally.  No wheezing, rales or rhonchi appreciated.  ABDOMEN:  Soft, nondistended, nontender. BS appreciated on auscultation.  No rebound tenderness or guarding.  EXTREMITIES: No calf tenderness bilaterally.  SKIN:  No jaundice, pallor, or cyanosis  NEURO:  A&O x 3  Surgical dressings clean, dry, intact.    Medications: [Standing]  acetaminophen   IVPB .. 1000 milliGRAM(s) IV Intermittent every 6 hours  albuterol    90 MICROgram(s) HFA Inhaler 1 Puff(s) Inhalation every 6 hours PRN  heparin   Injectable 5000 Unit(s) SubCutaneous every 8 hours  influenza  Vaccine (HIGH DOSE) 0.7 milliLiter(s) IntraMuscular once  ketorolac   Injectable 15 milliGRAM(s) IV Push every 6 hours  lactated ringers. 1000 milliLiter(s) IV Continuous <Continuous>  oxyCODONE    IR 5 milliGRAM(s) Oral every 4 hours PRN  oxyCODONE    IR 10 milliGRAM(s) Oral every 4 hours PRN  pantoprazole  Injectable 40 milliGRAM(s) IV Push daily  polyethylene glycol 3350 17 Gram(s) Oral daily    Medications: [PRN]  acetaminophen   IVPB .. 1000 milliGRAM(s) IV Intermittent every 6 hours  albuterol    90 MICROgram(s) HFA Inhaler 1 Puff(s) Inhalation every 6 hours PRN  heparin   Injectable 5000 Unit(s) SubCutaneous every 8 hours  influenza  Vaccine (HIGH DOSE) 0.7 milliLiter(s) IntraMuscular once  ketorolac   Injectable 15 milliGRAM(s) IV Push every 6 hours  lactated ringers. 1000 milliLiter(s) IV Continuous <Continuous>  oxyCODONE    IR 5 milliGRAM(s) Oral every 4 hours PRN  oxyCODONE    IR 10 milliGRAM(s) Oral every 4 hours PRN  pantoprazole  Injectable 40 milliGRAM(s) IV Push daily  polyethylene glycol 3350 17 Gram(s) Oral daily    Labs:                        13.9   5.26  )-----------( 202      ( 04 Dec 2023 08:12 )             40.9     12-04    140  |  104  |  5<L>  ----------------------------<  101<H>  3.9   |  33<H>  |  0.72    Ca    9.1      04 Dec 2023 08:12  Phos  4.0     12-04  Mg     1.8     12-04      PT/INR - ( 03 Dec 2023 06:40 )   PT: 11.1 sec;   INR: 0.95 ratio         PTT - ( 03 Dec 2023 06:40 )  PTT:34.8 sec      Imaging:  No post-op imaging studies    Assessment:  63yMale patient S/P JENNIFER with small bowel resection and primary anastomosis for SBO    Plan:  - Continue clear diet, Advance in am  - Pain control  - Zofran PRN  - Incentive spirometry  - Encourage ambulation  - SCDs  - DVT prophylaxis with HSQ to be started tomorrow  - Follow up AM labs  - Will continue to monitor Post Operative Note  Patient: IVAN BULL 63y (1960) Male   MRN: [id number]  Location: [facility] 2EAS 214 W1  Visit: 11-29-23 Inpatient  Date: 12-04-23 @ 22:53    Procedure: S/P JENNIFER with small bowel resection and primary anastomosis     Subjective:   Patient seen and examined at bedside, reports tolerating diet, ambulating.  Patient denies dizziness, chest pain, sob, nausea, vomiting, abdominal pain, or urinary complaints.    Objective:  Vitals: T(F): 98.2 (12-04-23 @ 20:14), Max: 98.2 (12-04-23 @ 20:14)  HR: 88 (12-04-23 @ 20:14)  BP: 118/82 (12-04-23 @ 20:14) (118/82 - 157/90)  RR: 18 (12-04-23 @ 20:14)  SpO2: 92% (12-04-23 @ 20:14)  Vent Settings:     In:   12-04-23 @ 07:01  -  12-04-23 @ 22:53  --------------------------------------------------------  IN: 720 mL      IV Fluids: lactated ringers. 1000 milliLiter(s) (50 mL/Hr) IV Continuous <Continuous>      Out:   12-04-23 @ 07:01  -  12-04-23 @ 22:53  --------------------------------------------------------  OUT: 300 mL      EBL:     Voided Urine:   12-04-23 @ 07:01  -  12-04-23 @ 22:53  --------------------------------------------------------  OUT: 300 mL      Brown Catheter: yes          GENERAL:  Well-nourished, well-developed Male lying comfortably in bed in NAD.  HEENT:  NC/AT. Sclera white. Mucous membranes moist.  CARDIO:  Regular rate and rhythm.  No murmur, gallop or rub appreciated.  RESPIRATORY:  Nonlabored breathing, no accessory muscle use. Lungs clear to auscultation bilaterally.  No wheezing, rales or rhonchi appreciated.  ABDOMEN:  Soft, nondistended, nontender. BS appreciated on auscultation.  No rebound tenderness or guarding.  EXTREMITIES: No calf tenderness bilaterally.  SKIN:  No jaundice, pallor, or cyanosis  NEURO:  A&O x 3  Surgical dressings clean, dry, intact.    Medications: [Standing]  acetaminophen   IVPB .. 1000 milliGRAM(s) IV Intermittent every 6 hours  albuterol    90 MICROgram(s) HFA Inhaler 1 Puff(s) Inhalation every 6 hours PRN  heparin   Injectable 5000 Unit(s) SubCutaneous every 8 hours  influenza  Vaccine (HIGH DOSE) 0.7 milliLiter(s) IntraMuscular once  ketorolac   Injectable 15 milliGRAM(s) IV Push every 6 hours  lactated ringers. 1000 milliLiter(s) IV Continuous <Continuous>  oxyCODONE    IR 5 milliGRAM(s) Oral every 4 hours PRN  oxyCODONE    IR 10 milliGRAM(s) Oral every 4 hours PRN  pantoprazole  Injectable 40 milliGRAM(s) IV Push daily  polyethylene glycol 3350 17 Gram(s) Oral daily    Medications: [PRN]  acetaminophen   IVPB .. 1000 milliGRAM(s) IV Intermittent every 6 hours  albuterol    90 MICROgram(s) HFA Inhaler 1 Puff(s) Inhalation every 6 hours PRN  heparin   Injectable 5000 Unit(s) SubCutaneous every 8 hours  influenza  Vaccine (HIGH DOSE) 0.7 milliLiter(s) IntraMuscular once  ketorolac   Injectable 15 milliGRAM(s) IV Push every 6 hours  lactated ringers. 1000 milliLiter(s) IV Continuous <Continuous>  oxyCODONE    IR 5 milliGRAM(s) Oral every 4 hours PRN  oxyCODONE    IR 10 milliGRAM(s) Oral every 4 hours PRN  pantoprazole  Injectable 40 milliGRAM(s) IV Push daily  polyethylene glycol 3350 17 Gram(s) Oral daily    Labs:                        13.9   5.26  )-----------( 202      ( 04 Dec 2023 08:12 )             40.9     12-04    140  |  104  |  5<L>  ----------------------------<  101<H>  3.9   |  33<H>  |  0.72    Ca    9.1      04 Dec 2023 08:12  Phos  4.0     12-04  Mg     1.8     12-04      PT/INR - ( 03 Dec 2023 06:40 )   PT: 11.1 sec;   INR: 0.95 ratio         PTT - ( 03 Dec 2023 06:40 )  PTT:34.8 sec      Imaging:  No post-op imaging studies    Assessment:  63yMale patient S/P JENNIFER with small bowel resection and primary anastomosis for SBO    Plan:  - Continue clear diet, Advance in am  - Pain control  - Zofran PRN  - Incentive spirometry  - Encourage ambulation  - SCDs  - DVT prophylaxis with HSQ to be started tomorrow  - Follow up AM labs  - Will continue to monitor

## 2023-12-04 NOTE — PROGRESS NOTE ADULT - SUBJECTIVE AND OBJECTIVE BOX
Postoperative Day #: ****    63y Male with PMHx/ PSHx of Hyperlipidemia, Sleep Apnea, Asthma, Hx of Thomson's Esophagus, GERD, Arthritis, SCC excision, melanoma excision admitted with Intestinal obstruction    .    Patient seen and examined during morning rounds  Abdominal pain is well controlled with medications  Denies nausea and vomiting. Tolerating full liquid diet. Made NPO at midnight for OR today 12/4  Having flatus/BM.   Denies f/c/ chest pain, dyspnea, cough.    T(F): 98.1 (12-04-23 @ 05:52), Max: 98.1 (12-04-23 @ 05:52)  HR: 68 (12-04-23 @ 05:52) (61 - 68)  BP: 134/73 (12-04-23 @ 05:52) (122/71 - 167/74)  RR: 15 (12-04-23 @ 05:52) (15 - 17)  SpO2: 97% (12-04-23 @ 05:52) (94% - 98%)  Wt(kg): --  CAPILLARY BLOOD GLUCOSE          PHYSICAL EXAM:  General: NAD  Neuro:  Alert & oriented x 3  CV: regular rate and rhythm  Lung: Symmetric chest rise and fall, no increased work of breathing   Abdomen: soft, NTND, without guarding  Extremities: no pedal edema or calf tenderness noted    LABS:                        13.9   5.26  )-----------( 202      ( 04 Dec 2023 08:12 )             40.9     12-04    140  |  104  |  5<L>  ----------------------------<  101<H>  3.9   |  33<H>  |  0.72    Ca    9.1      04 Dec 2023 08:12  Phos  4.0     12-04  Mg     1.8     12-04      PT/INR - ( 03 Dec 2023 06:40 )   PT: 11.1 sec;   INR: 0.95 ratio         PTT - ( 03 Dec 2023 06:40 )  PTT:34.8 sec  I&O's Detail

## 2023-12-04 NOTE — PRE-OP CHECKLIST - BSA (M2)
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 This visit was not recorded or stored.     Consent for telehealth visit was verified with the parent/guardian, including risk of electronic data, possibility of equipment failure or need for in person visit if condition cannot be fully assessed by telehealth. Parent/guardian was also informed that consent to treat includes permission to submit charges to the patient's insurance.     Provider Location: Home in the [facility]  Patient Location: Home in the [facility]    Platform used: Zoom  Reason for use of telehealth: minimize risk of potential exposure to viral respiratory illness during Covid 19 pandemic    Reason for Visit: SPEECH AND LANGUAGE THERAPY    SUBJECTIVE:  The following persons were present during the session: Mother and father.    During today's session the child was cooperative. Kike was engaged and participated in all therapeutic tasks. He was particularly motivated to practice speech sounds during gross motor tasks (e.g., kicking a soccer ball).     Pain Scale: patient caregiver reports pain is not an issue/concern    OBJECTIVE:  Treatment performed to address the functional goals as stated in the current INDIVIDUAL TREATMENT PLAN:     Functional Limitations Addressed:   Articulation/Phonological Delay/Disorder      Treatment Activities:   Play-Based Intervention, Technology Based Learning, Clinical Observation and Therapeutic  Activities      Home Program:  Articulation Exercises    Provided by: discussion    Education Results of Members Present and Participating:    Verbalizes understanding      ASSESSMENT:  The following progress was made towards the individual's goals: Kike produced /g/ in initial position of the CVC words with 75% accuracy provided an immediate model and moderate cueing (including physical cueing using a tongue depressor). He produced initial /k/ in CV and CVC words with 75% accuracy provided an immediate model and moderate cueing. Use of a tongue depressor and visual and verbal cues such as “open your mouth wide” helped to decrease velar fronting. Independently, Kike demonstrated velar fronting (e.g., t/k and d/g). He produced final /k/ in the words with 85% accuracy provided a delayed model. He aurally discriminated accurate production of /f/ in initial position of words with 4/4x independently. He produced both sounds in initial /s/ blends in words (e.g., snow, spider) with 90% accuracy provided a delayed model and minimal visual and gestural cues (e.g., finger sliding down arm). Kike reduced the phonological process of final consonant deletion by producing the final consonant in CVC words in 18/20x provided an immediate model and moderate verbal and visual cues.       Pain/Behaviors after treatment:  none    PLAN:  Continue weekly    ALMAS Montanez   11/18/2020    2.21

## 2023-12-04 NOTE — PROGRESS NOTE ADULT - ASSESSMENT
Impression: 63y Male with PMHx/ PSHx of Hyperlipidemia, Sleep Apnea, Asthma, Hx of Thomson's Esophagus, GERD, Arthritis, SCC excision, melanoma excision admitted with Intestinal obstruction with NTG placed and removed 11/30, due to improved symptoms of SBO. Patient tolerating FLD, made NPO at midnight for surgical intervention today 12/4. Patient with pain well controlled with medications. On abd exam, patient soft, nontender, nondistended, without guarding. Patient understands and agrees surgical intervention for recurrent SBO is the best option to prevent further episodes int the future.       PMH Hyperlipidemia    Sleep apnea    Asthma    History of Thomson's esophagus    GERD (gastroesophageal reflux disease)    Arthritis    H/O squamous cell carcinoma excision    H/O melanoma excision        Plan:  - Scheduled for OR today 12/4 with Dr. Lord for Lysis of Adhesions  - hold vte ppx this AM 12/4  - Patient made NPO at midnight  - f/u AM labs  - Follow medicine recs: clear for surgery on 12/4, - BPs have been elevated here; consider amlodipine 5mg qD if persists, patient with hx of hyperlipidemia  follow up outpatient for lipid panel and potential continuation of statin.    Plan discussed with Chief Resident Dr. Woods, discussed with Attending Physician Surgeon Dr. Lord

## 2023-12-04 NOTE — PROGRESS NOTE ADULT - PROBLEM SELECTOR PLAN 6
- follows with [facility] outpatient for yearly colonoscopy/endoscopies (developed 2/2 9/11 exposure)  - continue 40mg omeprazole daily. - follows with [facility] outpatient for yearly colonoscopy/endoscopies (developed 2/2 9/11 exposure)  - continue 40mg omeprazole daily.

## 2023-12-04 NOTE — PROGRESS NOTE ADULT - SUBJECTIVE AND OBJECTIVE BOX
Patient is a 63y old  Male who presents with a chief complaint of SBO (04 Dec 2023 11:39)      INTERVAL HPI/OVERNIGHT EVENTS: Patient seen and examined at bedside. No overnight events.    MEDICATIONS  (STANDING):  acetaminophen   IVPB .. 1000 milliGRAM(s) IV Intermittent every 6 hours  heparin   Injectable 5000 Unit(s) SubCutaneous every 8 hours  influenza  Vaccine (HIGH DOSE) 0.7 milliLiter(s) IntraMuscular once  ketorolac   Injectable 15 milliGRAM(s) IV Push every 6 hours  lactated ringers. 1000 milliLiter(s) (42 mL/Hr) IV Continuous <Continuous>  lactated ringers. 1000 milliLiter(s) (75 mL/Hr) IV Continuous <Continuous>  pantoprazole  Injectable 40 milliGRAM(s) IV Push daily  polyethylene glycol 3350 17 Gram(s) Oral daily    MEDICATIONS  (PRN):  albuterol    90 MICROgram(s) HFA Inhaler 1 Puff(s) Inhalation every 6 hours PRN Shortness of Breath and/or Wheezing  HYDROmorphone  Injectable 0.5 milliGRAM(s) IV Push every 10 minutes PRN Severe Pain (7 - 10)  ondansetron Injectable 4 milliGRAM(s) IV Push once PRN Nausea and/or Vomiting  oxyCODONE    IR 5 milliGRAM(s) Oral every 4 hours PRN Moderate Pain (4 - 6)  oxyCODONE    IR 5 milliGRAM(s) Oral once PRN Moderate Pain (4 - 6)  oxyCODONE    IR 10 milliGRAM(s) Oral every 4 hours PRN Severe Pain (7 - 10)      Allergies    No Known Allergies    Intolerances        REVIEW OF SYSTEMS:  CONSTITUTIONAL: No fever or chills  CARDIOVASCULAR: No chest pain, palpitations  GASTROINTESTINAL: No abd pain, nausea, vomiting, or diarrhea    Vital Signs Last 24 Hrs  T(C): 36.4 (04 Dec 2023 16:29), Max: 36.7 (03 Dec 2023 20:22)  T(F): 97.5 (04 Dec 2023 16:29), Max: 98.1 (04 Dec 2023 05:52)  HR: 82 (04 Dec 2023 16:59) (61 - 82)  BP: 151/90 (04 Dec 2023 16:59) (122/71 - 157/90)  BP(mean): --  RR: 14 (04 Dec 2023 16:59) (12 - 17)  SpO2: 98% (04 Dec 2023 16:59) (94% - 99%)    Parameters below as of 04 Dec 2023 16:29  Patient On (Oxygen Delivery Method): nasal cannula  O2 Flow (L/min): 1    I&O's Summary    04 Dec 2023 07:01  -  04 Dec 2023 17:44  --------------------------------------------------------  IN: 240 mL / OUT: 300 mL / NET: -60 mL      BMI (kg/m2): 36.5 (12-04-23 @ 11:28)    PHYSICAL EXAM:  GENERAL: NAD  HEENT:  AT/NC, anicteric, moist mucous membranes, EOMI, PERRL, no lid-lag, conjunctiva and sclera clear  CHEST/LUNG:  CTA b/l, no rales, wheezes, or rhonchi,  normal respiratory effort, no intercostal retractions  HEART:  RRR, S1, S2, no murmurs; no pitting edema  ABDOMEN:  BS+, soft, nontender, nondistended  MSK/EXTREMITIES: 2+ peripheral pulses, no clubbing or cyanosis  NERVOUS SYSTEM: answers questions and follows commands appropriately, A&Ox3 grossly moves all extremities   PSYCH: Appropriate affect, Alert & Awake; Good judgement      LABS: Personally reviewed  CBC                        13.9   5.26  )-----------( 202      ( 04 Dec 2023 08:12 )             40.9     CMP  12-04    140  |  104  |  5   ----------------------------<  101  3.9   |  33  |  0.72    Ca    9.1      04 Dec 2023 08:12  Phos  4.0     12-04  Mg     1.8     12-04            PT/INR - ( 03 Dec 2023 06:40 )   PT: 11.1 sec;   INR: 0.95 ratio         PTT - ( 03 Dec 2023 06:40 )  PTT:34.8 sec                            Urinalysis Basic - ( 04 Dec 2023 08:12 )    Color: x / Appearance: x / SG: x / pH: x  Gluc: 101 mg/dL / Ketone: x  / Bili: x / Urobili: x   Blood: x / Protein: x / Nitrite: x   Leuk Esterase: x / RBC: x / WBC x   Sq Epi: x / Non Sq Epi: x / Bacteria: x                RADIOLOGY & ADDITIONAL TESTS: Personally reviewed.     Consultant(s) Notes Reviewed:  [x] YES  [ ] NO   Discussed with NOREEN/SAMANTHA, RN

## 2023-12-04 NOTE — PROGRESS NOTE ADULT - PROBLEM SELECTOR PLAN 7
h/o melanomas and SCCs, follows at [facility]  - planned for melanoma resection on Friday. h/o melanomas and SCCs, follows at [facility]  - planned for melanoma resection on Friday.

## 2023-12-04 NOTE — PROGRESS NOTE ADULT - ASSESSMENT
Patient is a 63y old  Male with PMH HLD. sleep apnea, asthma, Barretts esophagus, SCCs and melanomas who presents with a chief complaint of SBO  Hospitalist consult for medical clearance for OR 12/4 - Dislodged mesh with recurrent obstruction.

## 2023-12-05 LAB
ANION GAP SERPL CALC-SCNC: 6 MMOL/L — SIGNIFICANT CHANGE UP (ref 5–17)
ANION GAP SERPL CALC-SCNC: 6 MMOL/L — SIGNIFICANT CHANGE UP (ref 5–17)
BASOPHILS # BLD AUTO: 0.02 K/UL — SIGNIFICANT CHANGE UP (ref 0–0.2)
BASOPHILS # BLD AUTO: 0.02 K/UL — SIGNIFICANT CHANGE UP (ref 0–0.2)
BASOPHILS NFR BLD AUTO: 0.2 % — SIGNIFICANT CHANGE UP (ref 0–2)
BASOPHILS NFR BLD AUTO: 0.2 % — SIGNIFICANT CHANGE UP (ref 0–2)
BUN SERPL-MCNC: 7 MG/DL — SIGNIFICANT CHANGE UP (ref 7–23)
BUN SERPL-MCNC: 7 MG/DL — SIGNIFICANT CHANGE UP (ref 7–23)
CALCIUM SERPL-MCNC: 8.5 MG/DL — SIGNIFICANT CHANGE UP (ref 8.5–10.1)
CALCIUM SERPL-MCNC: 8.5 MG/DL — SIGNIFICANT CHANGE UP (ref 8.5–10.1)
CHLORIDE SERPL-SCNC: 102 MMOL/L — SIGNIFICANT CHANGE UP (ref 96–108)
CHLORIDE SERPL-SCNC: 102 MMOL/L — SIGNIFICANT CHANGE UP (ref 96–108)
CO2 SERPL-SCNC: 30 MMOL/L — SIGNIFICANT CHANGE UP (ref 22–31)
CO2 SERPL-SCNC: 30 MMOL/L — SIGNIFICANT CHANGE UP (ref 22–31)
CREAT SERPL-MCNC: 0.74 MG/DL — SIGNIFICANT CHANGE UP (ref 0.5–1.3)
CREAT SERPL-MCNC: 0.74 MG/DL — SIGNIFICANT CHANGE UP (ref 0.5–1.3)
EGFR: 101 ML/MIN/1.73M2 — SIGNIFICANT CHANGE UP
EGFR: 101 ML/MIN/1.73M2 — SIGNIFICANT CHANGE UP
EOSINOPHIL # BLD AUTO: 0.01 K/UL — SIGNIFICANT CHANGE UP (ref 0–0.5)
EOSINOPHIL # BLD AUTO: 0.01 K/UL — SIGNIFICANT CHANGE UP (ref 0–0.5)
EOSINOPHIL NFR BLD AUTO: 0.1 % — SIGNIFICANT CHANGE UP (ref 0–6)
EOSINOPHIL NFR BLD AUTO: 0.1 % — SIGNIFICANT CHANGE UP (ref 0–6)
GLUCOSE SERPL-MCNC: 99 MG/DL — SIGNIFICANT CHANGE UP (ref 70–99)
GLUCOSE SERPL-MCNC: 99 MG/DL — SIGNIFICANT CHANGE UP (ref 70–99)
HCT VFR BLD CALC: 38.6 % — LOW (ref 39–50)
HCT VFR BLD CALC: 38.6 % — LOW (ref 39–50)
HGB BLD-MCNC: 12.9 G/DL — LOW (ref 13–17)
HGB BLD-MCNC: 12.9 G/DL — LOW (ref 13–17)
IMM GRANULOCYTES NFR BLD AUTO: 0.2 % — SIGNIFICANT CHANGE UP (ref 0–0.9)
IMM GRANULOCYTES NFR BLD AUTO: 0.2 % — SIGNIFICANT CHANGE UP (ref 0–0.9)
LYMPHOCYTES # BLD AUTO: 1.47 K/UL — SIGNIFICANT CHANGE UP (ref 1–3.3)
LYMPHOCYTES # BLD AUTO: 1.47 K/UL — SIGNIFICANT CHANGE UP (ref 1–3.3)
LYMPHOCYTES # BLD AUTO: 14.7 % — SIGNIFICANT CHANGE UP (ref 13–44)
LYMPHOCYTES # BLD AUTO: 14.7 % — SIGNIFICANT CHANGE UP (ref 13–44)
MAGNESIUM SERPL-MCNC: 1.7 MG/DL — SIGNIFICANT CHANGE UP (ref 1.6–2.6)
MAGNESIUM SERPL-MCNC: 1.7 MG/DL — SIGNIFICANT CHANGE UP (ref 1.6–2.6)
MCHC RBC-ENTMCNC: 31.8 PG — SIGNIFICANT CHANGE UP (ref 27–34)
MCHC RBC-ENTMCNC: 31.8 PG — SIGNIFICANT CHANGE UP (ref 27–34)
MCHC RBC-ENTMCNC: 33.4 GM/DL — SIGNIFICANT CHANGE UP (ref 32–36)
MCHC RBC-ENTMCNC: 33.4 GM/DL — SIGNIFICANT CHANGE UP (ref 32–36)
MCV RBC AUTO: 95.1 FL — SIGNIFICANT CHANGE UP (ref 80–100)
MCV RBC AUTO: 95.1 FL — SIGNIFICANT CHANGE UP (ref 80–100)
MONOCYTES # BLD AUTO: 0.69 K/UL — SIGNIFICANT CHANGE UP (ref 0–0.9)
MONOCYTES # BLD AUTO: 0.69 K/UL — SIGNIFICANT CHANGE UP (ref 0–0.9)
MONOCYTES NFR BLD AUTO: 6.9 % — SIGNIFICANT CHANGE UP (ref 2–14)
MONOCYTES NFR BLD AUTO: 6.9 % — SIGNIFICANT CHANGE UP (ref 2–14)
NEUTROPHILS # BLD AUTO: 7.77 K/UL — HIGH (ref 1.8–7.4)
NEUTROPHILS # BLD AUTO: 7.77 K/UL — HIGH (ref 1.8–7.4)
NEUTROPHILS NFR BLD AUTO: 77.9 % — HIGH (ref 43–77)
NEUTROPHILS NFR BLD AUTO: 77.9 % — HIGH (ref 43–77)
NRBC # BLD: 0 /100 WBCS — SIGNIFICANT CHANGE UP (ref 0–0)
NRBC # BLD: 0 /100 WBCS — SIGNIFICANT CHANGE UP (ref 0–0)
PHOSPHATE SERPL-MCNC: 3.5 MG/DL — SIGNIFICANT CHANGE UP (ref 2.5–4.5)
PHOSPHATE SERPL-MCNC: 3.5 MG/DL — SIGNIFICANT CHANGE UP (ref 2.5–4.5)
PLATELET # BLD AUTO: 219 K/UL — SIGNIFICANT CHANGE UP (ref 150–400)
PLATELET # BLD AUTO: 219 K/UL — SIGNIFICANT CHANGE UP (ref 150–400)
POTASSIUM SERPL-MCNC: 3.6 MMOL/L — SIGNIFICANT CHANGE UP (ref 3.5–5.3)
POTASSIUM SERPL-MCNC: 3.6 MMOL/L — SIGNIFICANT CHANGE UP (ref 3.5–5.3)
POTASSIUM SERPL-SCNC: 3.6 MMOL/L — SIGNIFICANT CHANGE UP (ref 3.5–5.3)
POTASSIUM SERPL-SCNC: 3.6 MMOL/L — SIGNIFICANT CHANGE UP (ref 3.5–5.3)
RBC # BLD: 4.06 M/UL — LOW (ref 4.2–5.8)
RBC # BLD: 4.06 M/UL — LOW (ref 4.2–5.8)
RBC # FLD: 12.3 % — SIGNIFICANT CHANGE UP (ref 10.3–14.5)
RBC # FLD: 12.3 % — SIGNIFICANT CHANGE UP (ref 10.3–14.5)
SODIUM SERPL-SCNC: 138 MMOL/L — SIGNIFICANT CHANGE UP (ref 135–145)
SODIUM SERPL-SCNC: 138 MMOL/L — SIGNIFICANT CHANGE UP (ref 135–145)
WBC # BLD: 9.98 K/UL — SIGNIFICANT CHANGE UP (ref 3.8–10.5)
WBC # BLD: 9.98 K/UL — SIGNIFICANT CHANGE UP (ref 3.8–10.5)
WBC # FLD AUTO: 9.98 K/UL — SIGNIFICANT CHANGE UP (ref 3.8–10.5)
WBC # FLD AUTO: 9.98 K/UL — SIGNIFICANT CHANGE UP (ref 3.8–10.5)

## 2023-12-05 PROCEDURE — 99233 SBSQ HOSP IP/OBS HIGH 50: CPT

## 2023-12-05 RX ORDER — ACETAMINOPHEN 500 MG
1000 TABLET ORAL ONCE
Refills: 0 | Status: COMPLETED | OUTPATIENT
Start: 2023-12-05 | End: 2023-12-05

## 2023-12-05 RX ORDER — ACETAMINOPHEN 500 MG
1000 TABLET ORAL ONCE
Refills: 0 | Status: COMPLETED | OUTPATIENT
Start: 2023-12-06 | End: 2023-12-06

## 2023-12-05 RX ADMIN — Medication 400 MILLIGRAM(S): at 01:33

## 2023-12-05 RX ADMIN — HEPARIN SODIUM 5000 UNIT(S): 5000 INJECTION INTRAVENOUS; SUBCUTANEOUS at 23:48

## 2023-12-05 RX ADMIN — OXYCODONE HYDROCHLORIDE 5 MILLIGRAM(S): 5 TABLET ORAL at 07:11

## 2023-12-05 RX ADMIN — OXYCODONE HYDROCHLORIDE 10 MILLIGRAM(S): 5 TABLET ORAL at 15:00

## 2023-12-05 RX ADMIN — OXYCODONE HYDROCHLORIDE 10 MILLIGRAM(S): 5 TABLET ORAL at 15:08

## 2023-12-05 RX ADMIN — Medication 400 MILLIGRAM(S): at 15:09

## 2023-12-05 RX ADMIN — Medication 15 MILLIGRAM(S): at 05:20

## 2023-12-05 RX ADMIN — Medication 400 MILLIGRAM(S): at 23:48

## 2023-12-05 RX ADMIN — Medication 15 MILLIGRAM(S): at 23:48

## 2023-12-05 RX ADMIN — Medication 400 MILLIGRAM(S): at 08:52

## 2023-12-05 RX ADMIN — Medication 1000 MILLIGRAM(S): at 15:30

## 2023-12-05 RX ADMIN — Medication 15 MILLIGRAM(S): at 06:20

## 2023-12-05 RX ADMIN — Medication 1000 MILLIGRAM(S): at 02:33

## 2023-12-05 RX ADMIN — HEPARIN SODIUM 5000 UNIT(S): 5000 INJECTION INTRAVENOUS; SUBCUTANEOUS at 15:09

## 2023-12-05 RX ADMIN — POLYETHYLENE GLYCOL 3350 17 GRAM(S): 17 POWDER, FOR SOLUTION ORAL at 12:26

## 2023-12-05 RX ADMIN — Medication 15 MILLIGRAM(S): at 18:00

## 2023-12-05 RX ADMIN — PANTOPRAZOLE SODIUM 40 MILLIGRAM(S): 20 TABLET, DELAYED RELEASE ORAL at 12:25

## 2023-12-05 RX ADMIN — Medication 15 MILLIGRAM(S): at 00:36

## 2023-12-05 RX ADMIN — Medication 1000 MILLIGRAM(S): at 09:30

## 2023-12-05 RX ADMIN — Medication 15 MILLIGRAM(S): at 12:30

## 2023-12-05 RX ADMIN — Medication 15 MILLIGRAM(S): at 12:25

## 2023-12-05 NOTE — CARE COORDINATION ASSESSMENT. - NSCAREPROVIDERS_GEN_ALL_CORE_FT
CARE PROVIDERS:  Accepting Physician: Silas Lord  Administration: Daljit Miranda  Admitting: Silas Lord  Attending: Silas Lord  Consultant: Silas Lord  Consultant: Weil, Patricia  Consultant: Paul Araiza  Consultant: Tony Blum  Consultant: Dulce Green  Consultant: Yoan Maier  Consultant: Khan, Fahrina  Covering Team: China Clay  Covering Team: Amandeep Cabrera  ED ACP: Misti Saldana  ED Attending: Bonnie Khanna  ED Nurse: Gena Irizarry  ED Nurse 2: Estiven Devlin  Nurse: Nilton Gonzalez  Nurse: Tracy Ellington  Ordered: Luz Elena Connor  Ordered: ADM, User  Override: Nilton Gonzalez  Override: Gena Aggarwal  Override: Kale Hidalgo  Override: Odalys Willis  Override: Chastity Apodaca  PCA/Nursing Assistant: Chastity Apodaca  PCA/Nursing Assistant: Shandra Branch  Primary Team: Lenny Phipps  Primary Team: Pranay Vázquez  Primary Team: Hans Shelby  Primary Team: Anna Aggarwal  Primary Team: Jean Woods  Primary Team: Hans Bullard  Primary Team: Esteban Mcgrath  Registered Dietitian: Leatha Whitaker  Team: [facility]  Hospitalists, Team  UR// Supp. Assoc.: Latoya Rios   CARE PROVIDERS:  Accepting Physician: Silas Lord  Administration: Daljit Miranda  Admitting: Silas Lord  Attending: Silas Lord  Consultant: Silas Lord  Consultant: Weil, Patricia  Consultant: Paul Araiza  Consultant: Tony Blum  Consultant: Dulce Green  Consultant: Yoan Maier  Consultant: Khan, Fahrina  Covering Team: China Clay  Covering Team: Amandeep Cabrera  ED ACP: Misti Saldana  ED Attending: Bonnie Khanna  ED Nurse: Gena Irizarry  ED Nurse 2: Estiven Devlin  Nurse: Nilton Gonzalez  Nurse: Tracy Ellington  Ordered: Luz Elena Connor  Ordered: ADM, User  Override: Nilton Gonazlez  Override: Gena Aggarwal  Override: Kale Hidalgo  Override: Odalys Willis  Override: Chastity Apodaca  PCA/Nursing Assistant: Chastity Apodaca  PCA/Nursing Assistant: Shandra Branch  Primary Team: Lenny Phipps  Primary Team: Pranay Vázquez  Primary Team: Hans Shelby  Primary Team: Anna Aggarwal  Primary Team: Jean Woods  Primary Team: Hans Bullard  Primary Team: Esteban Mcgrath  Registered Dietitian: Leatha Whitaker  Team: PLV  Hospitalists, Team  UR// Supp. Assoc.: Latoya Rios

## 2023-12-05 NOTE — PROGRESS NOTE ADULT - SUBJECTIVE AND OBJECTIVE BOX
CC/F/U for: SBO    HPI:  63yoM PSHx open umbilical hernia repair >10yrs ago, hx of previous SBOs in the past (last one about 10 yrs ago), presenting to [facility] ED with abdominal pain since 1:30 AM, associated with nausea. Pt reports similar to his previous SBOs, denies flatus today, denies BM, denies radiation of pain. Patient denies fever, chills, SOB, chest pain, diarrhea. (29 Nov 2023 19:11)    INTERVAL HPI/OVERNIGHT EVENTS:  Pt seen and examined at bedside - on clear liquid diet - tolerating; reports has some +flatus    Allergies/Intolerance: No Known Allergies      MEDICATIONS  (STANDING):  acetaminophen   IVPB .. 1000 milliGRAM(s) IV Intermittent every 6 hours  heparin   Injectable 5000 Unit(s) SubCutaneous every 8 hours  influenza  Vaccine (HIGH DOSE) 0.7 milliLiter(s) IntraMuscular once  ketorolac   Injectable 15 milliGRAM(s) IV Push every 6 hours  pantoprazole  Injectable 40 milliGRAM(s) IV Push daily  polyethylene glycol 3350 17 Gram(s) Oral daily    MEDICATIONS  (PRN):  albuterol    90 MICROgram(s) HFA Inhaler 1 Puff(s) Inhalation every 6 hours PRN Shortness of Breath and/or Wheezing  oxyCODONE    IR 5 milliGRAM(s) Oral every 4 hours PRN Moderate Pain (4 - 6)  oxyCODONE    IR 10 milliGRAM(s) Oral every 4 hours PRN Severe Pain (7 - 10)      ROS: as above; all other systems reviewed and wnl      PHYSICAL EXAMINATION:  Vital Signs Last 24 Hrs  T(C): 36.4 (05 Dec 2023 12:56), Max: 36.8 (04 Dec 2023 20:14)  T(F): 97.6 (05 Dec 2023 12:56), Max: 98.2 (04 Dec 2023 20:14)  HR: 62 (05 Dec 2023 12:56) (62 - 96)  BP: 125/77 (05 Dec 2023 12:56) (118/82 - 157/90)  RR: 18 (05 Dec 2023 12:56) (12 - 19)  SpO2: 98% (05 Dec 2023 12:56) (92% - 99%)    Parameters below as of 05 Dec 2023 12:56  Patient On (Oxygen Delivery Method): room air      GENERAL: obese, middle-aged male, in NAD  HEENT: mucous membranes dry  RESP: respirations unlabored  HEART: HR 60s  ABDOMEN: obese, soft, mildly distended, NT   EXTREMITIES: no edema b/l LEs, no calf tenderness  NEURO: awake, alert, interactive; moves all extremities      LABS:                        12.9   9.98  )-----------( 219      ( 05 Dec 2023 07:08 )             38.6     12-05    138  |  102  |  7   ----------------------------<  99  3.6   |  30  |  0.74    Ca    8.5      05 Dec 2023 07:08  Phos  3.5     12-05  Mg     1.7     12-05        Urinalysis Basic - ( 05 Dec 2023 07:08 )    Color: x / Appearance: x / SG: x / pH: x  Gluc: 99 mg/dL / Ketone: x  / Bili: x / Urobili: x   Blood: x / Protein: x / Nitrite: x   Leuk Esterase: x / RBC: x / WBC x   Sq Epi: x / Non Sq Epi: x / Bacteria: x       CC/F/U for: SBO    HPI:  63yoM PSHx open umbilical hernia repair >10yrs ago, hx of previous SBOs in the past (last one about 10 yrs ago), presenting to [facility] ED with abdominal pain since 1:30 AM, associated with nausea. Pt reports similar to his previous SBOs, denies flatus today, denies BM, denies radiation of pain. Patient denies fever, chills, SOB, chest pain, diarrhea. (29 Nov 2023 19:11)    INTERVAL HPI/OVERNIGHT EVENTS:  Pt seen and examined at bedside - on clear liquid diet - tolerating; reports has some +flatus    Allergies/Intolerance: No Known Allergies      MEDICATIONS  (STANDING):  acetaminophen   IVPB .. 1000 milliGRAM(s) IV Intermittent every 6 hours  heparin   Injectable 5000 Unit(s) SubCutaneous every 8 hours  influenza  Vaccine (HIGH DOSE) 0.7 milliLiter(s) IntraMuscular once  ketorolac   Injectable 15 milliGRAM(s) IV Push every 6 hours  pantoprazole  Injectable 40 milliGRAM(s) IV Push daily  polyethylene glycol 3350 17 Gram(s) Oral daily    MEDICATIONS  (PRN):  albuterol    90 MICROgram(s) HFA Inhaler 1 Puff(s) Inhalation every 6 hours PRN Shortness of Breath and/or Wheezing  oxyCODONE    IR 5 milliGRAM(s) Oral every 4 hours PRN Moderate Pain (4 - 6)  oxyCODONE    IR 10 milliGRAM(s) Oral every 4 hours PRN Severe Pain (7 - 10)      ROS: as above; all other systems reviewed and wnl      PHYSICAL EXAMINATION:  Vital Signs Last 24 Hrs  T(C): 36.4 (05 Dec 2023 12:56), Max: 36.8 (04 Dec 2023 20:14)  T(F): 97.6 (05 Dec 2023 12:56), Max: 98.2 (04 Dec 2023 20:14)  HR: 62 (05 Dec 2023 12:56) (62 - 96)  BP: 125/77 (05 Dec 2023 12:56) (118/82 - 157/90)  RR: 18 (05 Dec 2023 12:56) (12 - 19)  SpO2: 98% (05 Dec 2023 12:56) (92% - 99%)    Parameters below as of 05 Dec 2023 12:56  Patient On (Oxygen Delivery Method): room air      GENERAL: obese, middle-aged male, in NAD  HEENT: mucous membranes dry  RESP: respirations unlabored  HEART: HR 60s  ABDOMEN: obese, soft, mildly distended, NT   EXTREMITIES: no edema b/l LEs, no calf tenderness  NEURO: awake, alert, interactive; moves all extremities      LABS:                        12.9   9.98  )-----------( 219      ( 05 Dec 2023 07:08 )             38.6     12-05    138  |  102  |  7   ----------------------------<  99  3.6   |  30  |  0.74    Ca    8.5      05 Dec 2023 07:08  Phos  3.5     12-05  Mg     1.7     12-05        Urinalysis Basic - ( 05 Dec 2023 07:08 )    Color: x / Appearance: x / SG: x / pH: x  Gluc: 99 mg/dL / Ketone: x  / Bili: x / Urobili: x   Blood: x / Protein: x / Nitrite: x   Leuk Esterase: x / RBC: x / WBC x   Sq Epi: x / Non Sq Epi: x / Bacteria: x

## 2023-12-05 NOTE — PROGRESS NOTE ADULT - ASSESSMENT
63M, obesity, HL, asthma, MARY ANN, asthma, Thomson's esophagus, hx SCC/melanoma; hx umbilical hernia repair/mesh w/hx multiple SBOs all tx'd conservatively; mgmt for repeat SBO - s/p surgical intervention  -SBO assoc w/dislodged mesh - s/p OR 12/4 w/JENNIFER, small bowel resection and primary anastomosis - postop course stable   -on clear liquid diet - tolerating so far - dietary advancement per Surgery  -prn analgesia  -prn antiemetics  -IVFs for vol depletion  -HTn - BP in acceptable range at this time - holding antiHTNves in setting of decr po postoperatively - monitoring  -hx dermal malignancy - pt to continue f/up as o/p as previously  -mobilize oob  -dvt prophy

## 2023-12-05 NOTE — CARE COORDINATION ASSESSMENT. - NSPASTMEDSURGHISTORY_GEN_ALL_CORE_FT
PAST MEDICAL & SURGICAL HISTORY:  Asthma  WT responder--followed by [facility] monitoring center      Sleep apnea  CPAP      Hyperlipidemia      S/P inguinal hernia repair  right--1988      S/P lumbar fusion  L3-L4  ---2015      H/O melanoma excision  Left thigh      H/O squamous cell carcinoma excision  left hand      Arthritis      GERD (gastroesophageal reflux disease)      History of Thomson's esophagus      H/O cardiac catheterization  7-8 yr ago---normal      S/P total knee replacement, right  2018      H/O umbilical hernia repair  2005       PAST MEDICAL & SURGICAL HISTORY:  Asthma  WT responder--followed by [facility] monitoring center      Sleep apnea  CPAP      Hyperlipidemia      S/P inguinal hernia repair  right--1988      S/P lumbar fusion  L3-L4  ---2015      H/O melanoma excision  Left thigh      H/O squamous cell carcinoma excision  left hand      Arthritis      GERD (gastroesophageal reflux disease)      History of Thomson's esophagus      H/O cardiac catheterization  7-8 yr ago---normal      S/P total knee replacement, right  2018      H/O umbilical hernia repair  2005

## 2023-12-05 NOTE — PROGRESS NOTE ADULT - SUBJECTIVE AND OBJECTIVE BOX
Postoperative Day #: 1    63y Male with PMHx/ PSHx of Hyperlipidemia, Sleep Apnea, Asthma, Hx of Htomson's Esophagus, GERD, Arthritis, SCC excision, melanoma excision admitted with Intestinal obstruction. Patient now POD#1, S/p Lysis of adhesions with small bowel resection with primary anastomosis (12/4)    .    Patient seen and examined during morning rounds  Abdominal pain is well controlled with medications  Denies nausea and vomiting. Tolerating clear liquid diet  No flatus/BM.   Denies f/c/ chest pain, dyspnea, cough.    T(F): 97.4 (12-05-23 @ 05:42), Max: 98.2 (12-04-23 @ 20:14)  HR: 63 (12-05-23 @ 05:42) (63 - 96)  BP: 134/82 (12-05-23 @ 05:42) (118/82 - 157/90)  RR: 19 (12-05-23 @ 05:42) (12 - 19)  SpO2: 97% (12-05-23 @ 05:42) (92% - 99%)  Wt(kg): --  CAPILLARY BLOOD GLUCOSE          PHYSICAL EXAM:  General: NAD  Neuro:  Alert & oriented x 3  CV: regular rate and rhythm  Lung: symmetric chest rise and fall, no increased work of breathing   Abdomen: soft, NTND. wound site clean, dry, intact without oozing of blood or drainage, no signs of infection  Extremities: no pedal edema or calf tenderness noted     LABS:                        12.9   9.98  )-----------( 219      ( 05 Dec 2023 07:08 )             38.6     12-05    138  |  102  |  7   ----------------------------<  99  3.6   |  30  |  0.74    Ca    8.5      05 Dec 2023 07:08  Phos  3.5     12-05  Mg     1.7     12-05        I&O's Detail    04 Dec 2023 07:01  -  05 Dec 2023 07:00  --------------------------------------------------------  IN:    Lactated Ringers: 550 mL    Oral Fluid: 720 mL  Total IN: 1270 mL    OUT:    Voided (mL): 1720 mL  Total OUT: 1720 mL    Total NET: -450 mL          Impression: 63y Male with PMHx/ PSHx of Hyperlipidemia, Sleep Apnea, Asthma, Hx of Thomson's Esophagus, GERD, Arthritis, SCC excision, melanoma excision admitted with Intestinal obstruction. Patient now POD#1, S/p Lysis of adhesions with small bowel resection with primary anastomosis (12/4). Patient with pain well controlled, tolerating clear liquids. On abd exam, soft, nontender, nondistended. Wounds healing well. Patient recovering as expected      PMH Hyperlipidemia    Sleep apnea    Asthma    History of Thomson's esophagus    GERD (gastroesophageal reflux disease)    Arthritis    H/O squamous cell carcinoma excision    H/O melanoma excision        Plan:  - Advance diet to low fiber  - continue VTE prophylaxis   - Increase activity with OOB, Ambulate  - Patient instructed on and encouraged incentive spirometry use  -f/u AM labs    Plan discussed with Chief resident Dr. Woods, discussed with Attending physician surgeon Dr. Lord Postoperative Day #: 1    63y Male with PMHx/ PSHx of Hyperlipidemia, Sleep Apnea, Asthma, Hx of Thomson's Esophagus, GERD, Arthritis, SCC excision, melanoma excision admitted with Intestinal obstruction. Patient now POD#1, S/p Lysis of adhesions with small bowel resection with primary anastomosis (12/4)    .    Patient seen and examined during morning rounds  Abdominal pain is well controlled with medications  Denies nausea and vomiting. Tolerating clear liquid diet  No flatus/BM.   Denies f/c/ chest pain, dyspnea, cough.    T(F): 97.4 (12-05-23 @ 05:42), Max: 98.2 (12-04-23 @ 20:14)  HR: 63 (12-05-23 @ 05:42) (63 - 96)  BP: 134/82 (12-05-23 @ 05:42) (118/82 - 157/90)  RR: 19 (12-05-23 @ 05:42) (12 - 19)  SpO2: 97% (12-05-23 @ 05:42) (92% - 99%)  Wt(kg): --  CAPILLARY BLOOD GLUCOSE          PHYSICAL EXAM:  General: NAD  Neuro:  Alert & oriented x 3  CV: regular rate and rhythm  Lung: symmetric chest rise and fall, no increased work of breathing   Abdomen: soft, NTND. wound site clean, dry, intact without oozing of blood or drainage, no signs of infection  Extremities: no pedal edema or calf tenderness noted     LABS:                        12.9   9.98  )-----------( 219      ( 05 Dec 2023 07:08 )             38.6     12-05    138  |  102  |  7   ----------------------------<  99  3.6   |  30  |  0.74    Ca    8.5      05 Dec 2023 07:08  Phos  3.5     12-05  Mg     1.7     12-05        I&O's Detail    04 Dec 2023 07:01  -  05 Dec 2023 07:00  --------------------------------------------------------  IN:    Lactated Ringers: 550 mL    Oral Fluid: 720 mL  Total IN: 1270 mL    OUT:    Voided (mL): 1720 mL  Total OUT: 1720 mL    Total NET: -450 mL          Impression: 63y Male with PMHx/ PSHx of Hyperlipidemia, Sleep Apnea, Asthma, Hx of Thomson's Esophagus, GERD, Arthritis, SCC excision, melanoma excision admitted with Intestinal obstruction. Patient now POD#1, S/p Lysis of adhesions with small bowel resection with primary anastomosis (12/4). Patient with pain well controlled, tolerating clear liquids. On abd exam, soft, nontender, nondistended. Wounds healing well. Patient recovering as expected      PMH Hyperlipidemia    Sleep apnea    Asthma    History of Thomson's esophagus    GERD (gastroesophageal reflux disease)    Arthritis    H/O squamous cell carcinoma excision    H/O melanoma excision        Plan:  - Advance diet to low fiber  - continue VTE prophylaxis   - Increase activity with OOB, Ambulate  - Patient instructed on and encouraged incentive spirometry use  -f/u AM labs    Plan discussed with Chief resident Dr. Woods, discussed with Attending physician surgeon Dr. Lord

## 2023-12-05 NOTE — PROGRESS NOTE ADULT - ASSESSMENT
Pt. is well this AM. Tolerates diet which will be progressed to low fiber regular  Labs reviewed.  Pt seen with Surgical Team.

## 2023-12-05 NOTE — CARE COORDINATION ASSESSMENT. - OTHER PERTINENT DISCHARGE PLANNING INFORMATION:
Patient admitted for Abdominal pain. Patient had surgery 12/4. The patient is on a clear liquid diet. The patient lives with wife and she will pickup. Patient educated on the role of CM and discussed DC planning. All questions answered.

## 2023-12-06 PROCEDURE — 99232 SBSQ HOSP IP/OBS MODERATE 35: CPT

## 2023-12-06 RX ORDER — IBUPROFEN 200 MG
600 TABLET ORAL EVERY 6 HOURS
Refills: 0 | Status: DISCONTINUED | OUTPATIENT
Start: 2023-12-06 | End: 2023-12-07

## 2023-12-06 RX ORDER — IBUPROFEN 200 MG
600 TABLET ORAL EVERY 6 HOURS
Refills: 0 | Status: DISCONTINUED | OUTPATIENT
Start: 2023-12-06 | End: 2023-12-06

## 2023-12-06 RX ORDER — SENNA PLUS 8.6 MG/1
1 TABLET ORAL DAILY
Refills: 0 | Status: DISCONTINUED | OUTPATIENT
Start: 2023-12-06 | End: 2023-12-07

## 2023-12-06 RX ADMIN — Medication 400 MILLIGRAM(S): at 16:55

## 2023-12-06 RX ADMIN — Medication 600 MILLIGRAM(S): at 16:55

## 2023-12-06 RX ADMIN — Medication 600 MILLIGRAM(S): at 13:30

## 2023-12-06 RX ADMIN — Medication 400 MILLIGRAM(S): at 04:47

## 2023-12-06 RX ADMIN — POLYETHYLENE GLYCOL 3350 17 GRAM(S): 17 POWDER, FOR SOLUTION ORAL at 12:36

## 2023-12-06 RX ADMIN — HEPARIN SODIUM 5000 UNIT(S): 5000 INJECTION INTRAVENOUS; SUBCUTANEOUS at 21:23

## 2023-12-06 RX ADMIN — Medication 600 MILLIGRAM(S): at 12:36

## 2023-12-06 RX ADMIN — Medication 1000 MILLIGRAM(S): at 17:58

## 2023-12-06 RX ADMIN — Medication 600 MILLIGRAM(S): at 04:48

## 2023-12-06 RX ADMIN — PANTOPRAZOLE SODIUM 40 MILLIGRAM(S): 20 TABLET, DELAYED RELEASE ORAL at 12:36

## 2023-12-06 RX ADMIN — Medication 600 MILLIGRAM(S): at 17:58

## 2023-12-06 RX ADMIN — HEPARIN SODIUM 5000 UNIT(S): 5000 INJECTION INTRAVENOUS; SUBCUTANEOUS at 14:31

## 2023-12-06 RX ADMIN — Medication 600 MILLIGRAM(S): at 06:07

## 2023-12-06 RX ADMIN — Medication 1000 MILLIGRAM(S): at 10:58

## 2023-12-06 RX ADMIN — Medication 400 MILLIGRAM(S): at 09:59

## 2023-12-06 RX ADMIN — HEPARIN SODIUM 5000 UNIT(S): 5000 INJECTION INTRAVENOUS; SUBCUTANEOUS at 07:29

## 2023-12-06 NOTE — PROGRESS NOTE ADULT - PROBLEM SELECTOR PLAN 5
- follows with [facility] outpatient for yearly colonoscopy/endoscopies (developed 2/2 9/11 exposure)  - continue 40mg omeprazole daily. - follows with [facility] outpatient for yearly colonoscopy/endoscopies (developed 2/2 9/11 exposure)  - continue 40mg omeprazole daily.

## 2023-12-06 NOTE — PROGRESS NOTE ADULT - ASSESSMENT
Surg. Att.  Pt seen and examined this Am. Discussed with Surgical Team.  He is doing well. Minimal complaints. Tolerates diet.  Will evaluate throughout the day. possible discharge tonight or tomorrow.

## 2023-12-06 NOTE — DIETITIAN INITIAL EVALUATION ADULT - ADD RECOMMEND
1) Continue current diet as tolerated  2) Recommend MVI daily  3) Monitor po intake, diet tolerance, weight trends, labs, GI function, skin integrity

## 2023-12-06 NOTE — PROGRESS NOTE ADULT - PROBLEM SELECTOR PLAN 6
h/o melanomas and SCCs, follows at [facility]  - planned for melanoma resection on Friday. h/o melanomas and SCCs, follows at [facility]  - planned for melanoma resection on Friday.

## 2023-12-06 NOTE — DIETITIAN INITIAL EVALUATION ADULT - PERTINENT LABORATORY DATA
12-05    138  |  102  |  7   ----------------------------<  99  3.6   |  30  |  0.74    Ca    8.5      05 Dec 2023 07:08  Phos  3.5     12-05  Mg     1.7     12-05

## 2023-12-06 NOTE — DIETITIAN INITIAL EVALUATION ADULT - NS FNS DIET ORDER
Diet, Low Fiber:   Ensure Surgery Cans or Servings Per Day:  1     Special Instructions for Nursing:  Advance diet to low fiber with 1 ensure surgery if patient tolerates 1 clear liquid tray (12-05-23 @ 10:19) [Active]

## 2023-12-06 NOTE — PROGRESS NOTE ADULT - PROBLEM SELECTOR PLAN 7
since significant weight loss this year, he is mostly asx and uses CPAP rarely   - BPs have been elevated here, he states likely 2/2 anxiety before procedure  - O2 sats within NL, no desats noted   - consider encouragement of CPAP if blood pressures remain elevated.

## 2023-12-06 NOTE — DIETITIAN INITIAL EVALUATION ADULT - PERTINENT MEDS FT
MEDICATIONS  (STANDING):  acetaminophen   IVPB .. 1000 milliGRAM(s) IV Intermittent once  heparin   Injectable 5000 Unit(s) SubCutaneous every 8 hours  ibuprofen  Tablet. 600 milliGRAM(s) Oral every 6 hours  influenza  Vaccine (HIGH DOSE) 0.7 milliLiter(s) IntraMuscular once  pantoprazole  Injectable 40 milliGRAM(s) IV Push daily  polyethylene glycol 3350 17 Gram(s) Oral daily    MEDICATIONS  (PRN):  albuterol    90 MICROgram(s) HFA Inhaler 1 Puff(s) Inhalation every 6 hours PRN Shortness of Breath and/or Wheezing  oxyCODONE    IR 5 milliGRAM(s) Oral every 4 hours PRN Moderate Pain (4 - 6)  oxyCODONE    IR 10 milliGRAM(s) Oral every 4 hours PRN Severe Pain (7 - 10)

## 2023-12-06 NOTE — DIETITIAN INITIAL EVALUATION ADULT - SIGNS/SYMPTOMS
as evidenced by BMI 36.  as evidenced by s/p Lysis of adhesions with small bowel resection with primary anastomosis.

## 2023-12-06 NOTE — PROGRESS NOTE ADULT - TIME BILLING
Reviewing chart notes and data, face to face time counseling the patient, coordinating care with SW/CM at [facility]. Reviewing chart notes and data, face to face time counseling the patient, coordinating care with SW/CM at [facility].

## 2023-12-06 NOTE — PROGRESS NOTE ADULT - SUBJECTIVE AND OBJECTIVE BOX
INTERVAL HPI/OVERNIGHT EVENTS:  Patient seen and examined at bedside. States he is feeling well. States he is tolerating diet, passing flatus, but has not had a BM yet. States he is ambulating without difficulty. Denies any chest pain, SOB. Does endorse some soreness in abdomen, states feels it is gas-related.    ROS: All other review of systems is negative unless indicated above.    MEDICATIONS  (STANDING):  heparin   Injectable 5000 Unit(s) SubCutaneous every 8 hours  ibuprofen  Tablet. 600 milliGRAM(s) Oral every 6 hours  influenza  Vaccine (HIGH DOSE) 0.7 milliLiter(s) IntraMuscular once  pantoprazole  Injectable 40 milliGRAM(s) IV Push daily  polyethylene glycol 3350 17 Gram(s) Oral daily    MEDICATIONS  (PRN):  albuterol    90 MICROgram(s) HFA Inhaler 1 Puff(s) Inhalation every 6 hours PRN Shortness of Breath and/or Wheezing  oxyCODONE    IR 5 milliGRAM(s) Oral every 4 hours PRN Moderate Pain (4 - 6)  oxyCODONE    IR 10 milliGRAM(s) Oral every 4 hours PRN Severe Pain (7 - 10)      Allergies    No Known Allergies    Intolerances           Vital Signs Last 24 Hrs  T(C): 36.6 (06 Dec 2023 19:46), Max: 36.6 (06 Dec 2023 00:30)  T(F): 97.8 (06 Dec 2023 19:46), Max: 97.8 (06 Dec 2023 00:30)  HR: 76 (06 Dec 2023 19:46) (65 - 76)  BP: 132/78 (06 Dec 2023 19:46) (118/70 - 159/87)  BP(mean): --  RR: 18 (06 Dec 2023 19:46) (18 - 19)  SpO2: 98% (06 Dec 2023 19:46) (95% - 98%)    Parameters below as of 06 Dec 2023 19:46  Patient On (Oxygen Delivery Method): room air        12-05 @ 07:01  -  12-06 @ 07:00  --------------------------------------------------------  IN: 0 mL / OUT: 1500 mL / NET: -1500 mL      Physical Exam:  General: sitting comfortably in bed, NAD  Neurology: A&Ox3, nonfocal, MOMIN x 4  Respiratory: CTA B/L  CV: RRR, S1S2, no murmurs, rubs or gallops  Abdominal: Soft, +mild TTP over incision site, no rebound, no guarding, +mild distention, +BS  Extremities: No edema, + peripheral pulses      LABS:                        12.9   9.98  )-----------( 219      ( 05 Dec 2023 07:08 )             38.6     12-05    138  |  102  |  7   ----------------------------<  99  3.6   |  30  |  0.74    Ca    8.5      05 Dec 2023 07:08  Phos  3.5     12-05  Mg     1.7     12-05        Urinalysis Basic - ( 05 Dec 2023 07:08 )    Color: x / Appearance: x / SG: x / pH: x  Gluc: 99 mg/dL / Ketone: x  / Bili: x / Urobili: x   Blood: x / Protein: x / Nitrite: x   Leuk Esterase: x / RBC: x / WBC x   Sq Epi: x / Non Sq Epi: x / Bacteria: x        RADIOLOGY & ADDITIONAL TESTS:

## 2023-12-06 NOTE — PROGRESS NOTE ADULT - SUBJECTIVE AND OBJECTIVE BOX
POD#2 s/p JENNIFER, small bowel resection with primary anastomosis     S: Patient seen and examined at bedside.  No acute overnight events.  Patient reports abdominal pain at the midline, most significant at the midline incision.  Admits to flatus.  Voiding, ambulating and tolerating diet. Patient denies any fever, chills, chest pain, shortness of breath, nausea, vomiting, or urinary complaints.    MEDICATIONS:  acetaminophen   IVPB .. 1000 milliGRAM(s) IV Intermittent once  acetaminophen   IVPB .. 1000 milliGRAM(s) IV Intermittent once  albuterol    90 MICROgram(s) HFA Inhaler 1 Puff(s) Inhalation every 6 hours PRN  heparin   Injectable 5000 Unit(s) SubCutaneous every 8 hours  ibuprofen  Tablet. 600 milliGRAM(s) Oral every 6 hours  influenza  Vaccine (HIGH DOSE) 0.7 milliLiter(s) IntraMuscular once  oxyCODONE    IR 5 milliGRAM(s) Oral every 4 hours PRN  oxyCODONE    IR 10 milliGRAM(s) Oral every 4 hours PRN  pantoprazole  Injectable 40 milliGRAM(s) IV Push daily  polyethylene glycol 3350 17 Gram(s) Oral daily      O:  Vital Signs Last 24 Hrs  T(C): 36.3 (06 Dec 2023 04:29), Max: 36.7 (05 Dec 2023 20:05)  T(F): 97.4 (06 Dec 2023 04:29), Max: 98 (05 Dec 2023 20:05)  HR: 65 (06 Dec 2023 04:29) (62 - 70)  BP: 159/87 (06 Dec 2023 04:29) (118/70 - 159/87)  RR: 18 (06 Dec 2023 04:29) (18 - 19)  SpO2: 95% (06 Dec 2023 04:29) (95% - 98%)    Parameters below as of 06 Dec 2023 04:29  Patient On (Oxygen Delivery Method): room air    PHYSICAL EXAM:  GENERAL: No acute distress, lying comfortably in bed  HEAD:  Atraumatic, Normocephalic  CHEST/LUNG: Non labored respirations, no accessory muscle use  HEART: Regular rate and rhythm  ABDOMEN: Softly distended, jennifer-incisional tenderness, surgical incisions c/d/i  EXT: calves non-tender b/l, no edema  NEUROLOGY: A&O x 3, no focal deficits    I&O SUMMARY:    12-05-23 @ 07:01  -  12-06-23 @ 07:00  --------------------------------------------------------  IN:  Total IN: 0 mL    OUT:    Voided (mL): 1500 mL  Total OUT: 1500 mL    Total NET: -1500 mL      LABS:                        12.9   9.98  )-----------( 219      ( 05 Dec 2023 07:08 )             38.6     12-05    138  |  102  |  7   ----------------------------<  99  3.6   |  30  |  0.74    Ca    8.5      05 Dec 2023 07:08  Phos  3.5     12-05  Mg     1.7     12-05      ASSESSMENT: 62y/o Male w/ PMHx Hyperlipidemia, Sleep Apnea, Asthma, Hx of Thomson's Esophagus, GERD, Arthritis, SCC excision, melanoma excision; found to have SBO, now POD#2 s/p JENNIFER, SBR w/ primary anastomosis. Pt is w.o complaints, tolerating diet, ambulating and passing flatus. VSSAF. Exam consistent wthi post-op status. POD#1 labs reassuring.     PLAN:  - VSSAF, Exam consistent wthi post-op status  - Continue diet  - Pain control as needed  - Encourage OOB/ambulation & incentive spirometry  - DVT ppx with SQH  - Dispo planning     To be discussed with Dr. Lord    Surgical Team Spectralink: [id number]   POD#2 s/p JENNIFER, small bowel resection with primary anastomosis     S: Patient seen and examined at bedside.  No acute overnight events.  Patient reports abdominal pain at the midline, most significant at the midline incision.  Admits to flatus.  Voiding, ambulating and tolerating diet. Patient denies any fever, chills, chest pain, shortness of breath, nausea, vomiting, or urinary complaints.    MEDICATIONS:  acetaminophen   IVPB .. 1000 milliGRAM(s) IV Intermittent once  acetaminophen   IVPB .. 1000 milliGRAM(s) IV Intermittent once  albuterol    90 MICROgram(s) HFA Inhaler 1 Puff(s) Inhalation every 6 hours PRN  heparin   Injectable 5000 Unit(s) SubCutaneous every 8 hours  ibuprofen  Tablet. 600 milliGRAM(s) Oral every 6 hours  influenza  Vaccine (HIGH DOSE) 0.7 milliLiter(s) IntraMuscular once  oxyCODONE    IR 5 milliGRAM(s) Oral every 4 hours PRN  oxyCODONE    IR 10 milliGRAM(s) Oral every 4 hours PRN  pantoprazole  Injectable 40 milliGRAM(s) IV Push daily  polyethylene glycol 3350 17 Gram(s) Oral daily      O:  Vital Signs Last 24 Hrs  T(C): 36.3 (06 Dec 2023 04:29), Max: 36.7 (05 Dec 2023 20:05)  T(F): 97.4 (06 Dec 2023 04:29), Max: 98 (05 Dec 2023 20:05)  HR: 65 (06 Dec 2023 04:29) (62 - 70)  BP: 159/87 (06 Dec 2023 04:29) (118/70 - 159/87)  RR: 18 (06 Dec 2023 04:29) (18 - 19)  SpO2: 95% (06 Dec 2023 04:29) (95% - 98%)    Parameters below as of 06 Dec 2023 04:29  Patient On (Oxygen Delivery Method): room air    PHYSICAL EXAM:  GENERAL: No acute distress, lying comfortably in bed  HEAD:  Atraumatic, Normocephalic  CHEST/LUNG: Non labored respirations, no accessory muscle use  HEART: Regular rate and rhythm  ABDOMEN: Softly distended, jennifer-incisional tenderness, surgical incisions c/d/i  EXT: calves non-tender b/l, no edema  NEUROLOGY: A&O x 3, no focal deficits    I&O SUMMARY:    12-05-23 @ 07:01  -  12-06-23 @ 07:00  --------------------------------------------------------  IN:  Total IN: 0 mL    OUT:    Voided (mL): 1500 mL  Total OUT: 1500 mL    Total NET: -1500 mL      LABS:                        12.9   9.98  )-----------( 219      ( 05 Dec 2023 07:08 )             38.6     12-05    138  |  102  |  7   ----------------------------<  99  3.6   |  30  |  0.74    Ca    8.5      05 Dec 2023 07:08  Phos  3.5     12-05  Mg     1.7     12-05      ASSESSMENT: 64y/o Male w/ PMHx Hyperlipidemia, Sleep Apnea, Asthma, Hx of Thomson's Esophagus, GERD, Arthritis, SCC excision, melanoma excision; found to have SBO, now POD#2 s/p JENNIFER, SBR w/ primary anastomosis. Pt is w.o complaints, tolerating diet, ambulating and passing flatus. VSSAF. Exam consistent wthi post-op status. POD#1 labs reassuring.     PLAN:  - VSSAF, Exam consistent wthi post-op status  - Continue diet  - Pain control as needed  - Encourage OOB/ambulation & incentive spirometry  - DVT ppx with SQH  - Dispo planning     To be discussed with Dr. Lord    Surgical Team Spectralink: [id number]

## 2023-12-06 NOTE — DIETITIAN INITIAL EVALUATION ADULT - ETIOLOGY
related to alteration in GI tract function related to excessive energy intake and physical inactivity

## 2023-12-06 NOTE — DIETITIAN INITIAL EVALUATION ADULT - NSICDXPASTMEDICALHX_GEN_ALL_CORE_FT
PAST MEDICAL HISTORY:  Arthritis     Asthma WTC responder--followed by [facility] monitoring center    GERD (gastroesophageal reflux disease)     H/O melanoma excision Left thigh    H/O squamous cell carcinoma excision left hand    History of Thomson's esophagus     Hyperlipidemia     Sleep apnea CPAP     PAST MEDICAL HISTORY:  Arthritis     Asthma WTC responder--followed by [facility] monitoring center    GERD (gastroesophageal reflux disease)     H/O melanoma excision Left thigh    H/O squamous cell carcinoma excision left hand    History of Thomson's esophagus     Hyperlipidemia     Sleep apnea CPAP

## 2023-12-06 NOTE — DIETITIAN INITIAL EVALUATION ADULT - OTHER INFO
Pt is a "62 yo Male with PMHx/ PSHx of Hyperlipidemia, Sleep Apnea, Asthma, Hx of Thomson's Esophagus, GERD, Arthritis, SCC excision, melanoma excision admitted with Intestinal obstruction. Patient now POD#1, S/p Lysis of adhesions with small bowel resection with primary anastomosis."    Visited pt at bedside this am. Diet advanced to low-fiber yesterday. Pt tolerating diet well. Reports fair intake of meals. No food allergies. No chewing/swallowing difficulties. Denies N/V. +BM 11/30; bowel regimen rx. CBW on admission 240#. Pt reports stable weight. No edema noted. Skin: abdominal surgical incision. Food preferences obtained to optimize po intake/tolerance. Provided verbal and written low-fiber diet education during visit today. RD remains available.

## 2023-12-06 NOTE — DIETITIAN INITIAL EVALUATION ADULT - ORAL INTAKE PTA/DIET HISTORY
Pt reports good appetite/intake PTA. Typically consumes 2-3 meals/day. Wife prepares pt's meals. Pt has been cutting back on carbohydrates.

## 2023-12-07 ENCOUNTER — TRANSCRIPTION ENCOUNTER (OUTPATIENT)
Age: 63
End: 2023-12-07

## 2023-12-07 VITALS
HEART RATE: 70 BPM | TEMPERATURE: 98 F | DIASTOLIC BLOOD PRESSURE: 79 MMHG | OXYGEN SATURATION: 97 % | RESPIRATION RATE: 18 BRPM | SYSTOLIC BLOOD PRESSURE: 143 MMHG

## 2023-12-07 LAB
SURGICAL PATHOLOGY STUDY: SIGNIFICANT CHANGE UP
SURGICAL PATHOLOGY STUDY: SIGNIFICANT CHANGE UP

## 2023-12-07 PROCEDURE — 74019 RADEX ABDOMEN 2 VIEWS: CPT

## 2023-12-07 PROCEDURE — 83735 ASSAY OF MAGNESIUM: CPT

## 2023-12-07 PROCEDURE — 74177 CT ABD & PELVIS W/CONTRAST: CPT | Mod: MA

## 2023-12-07 PROCEDURE — 80048 BASIC METABOLIC PNL TOTAL CA: CPT

## 2023-12-07 PROCEDURE — 86850 RBC ANTIBODY SCREEN: CPT

## 2023-12-07 PROCEDURE — 86900 BLOOD TYPING SEROLOGIC ABO: CPT

## 2023-12-07 PROCEDURE — C9399: CPT

## 2023-12-07 PROCEDURE — 86901 BLOOD TYPING SEROLOGIC RH(D): CPT

## 2023-12-07 PROCEDURE — 85610 PROTHROMBIN TIME: CPT

## 2023-12-07 PROCEDURE — 99232 SBSQ HOSP IP/OBS MODERATE 35: CPT

## 2023-12-07 PROCEDURE — 86803 HEPATITIS C AB TEST: CPT

## 2023-12-07 PROCEDURE — 74250 X-RAY XM SM INT 1CNTRST STD: CPT

## 2023-12-07 PROCEDURE — 99285 EMERGENCY DEPT VISIT HI MDM: CPT

## 2023-12-07 PROCEDURE — C1889: CPT

## 2023-12-07 PROCEDURE — 83605 ASSAY OF LACTIC ACID: CPT

## 2023-12-07 PROCEDURE — 96375 TX/PRO/DX INJ NEW DRUG ADDON: CPT

## 2023-12-07 PROCEDURE — 71045 X-RAY EXAM CHEST 1 VIEW: CPT

## 2023-12-07 PROCEDURE — 96376 TX/PRO/DX INJ SAME DRUG ADON: CPT

## 2023-12-07 PROCEDURE — 88307 TISSUE EXAM BY PATHOLOGIST: CPT

## 2023-12-07 PROCEDURE — 80053 COMPREHEN METABOLIC PANEL: CPT

## 2023-12-07 PROCEDURE — 36415 COLL VENOUS BLD VENIPUNCTURE: CPT

## 2023-12-07 PROCEDURE — 96374 THER/PROPH/DIAG INJ IV PUSH: CPT

## 2023-12-07 PROCEDURE — 81003 URINALYSIS AUTO W/O SCOPE: CPT

## 2023-12-07 PROCEDURE — 93005 ELECTROCARDIOGRAM TRACING: CPT

## 2023-12-07 PROCEDURE — 85025 COMPLETE CBC W/AUTO DIFF WBC: CPT

## 2023-12-07 PROCEDURE — 84100 ASSAY OF PHOSPHORUS: CPT

## 2023-12-07 PROCEDURE — 85730 THROMBOPLASTIN TIME PARTIAL: CPT

## 2023-12-07 PROCEDURE — 83690 ASSAY OF LIPASE: CPT

## 2023-12-07 PROCEDURE — 85027 COMPLETE CBC AUTOMATED: CPT

## 2023-12-07 RX ORDER — IBUPROFEN 200 MG
1 TABLET ORAL
Qty: 30 | Refills: 0
Start: 2023-12-07

## 2023-12-07 RX ORDER — ACETAMINOPHEN 500 MG
1000 TABLET ORAL EVERY 6 HOURS
Refills: 0 | Status: DISCONTINUED | OUTPATIENT
Start: 2023-12-07 | End: 2023-12-07

## 2023-12-07 RX ORDER — POLYETHYLENE GLYCOL 3350 17 G/17G
17 POWDER, FOR SOLUTION ORAL ONCE
Refills: 0 | Status: COMPLETED | OUTPATIENT
Start: 2023-12-07 | End: 2023-12-07

## 2023-12-07 RX ORDER — DOCUSATE SODIUM 100 MG
1 CAPSULE ORAL
Qty: 20 | Refills: 0
Start: 2023-12-07 | End: 2024-01-05

## 2023-12-07 RX ORDER — ACETAMINOPHEN 500 MG
2 TABLET ORAL
Qty: 30 | Refills: 0
Start: 2023-12-07

## 2023-12-07 RX ORDER — OXYCODONE HYDROCHLORIDE 5 MG/1
1 TABLET ORAL
Qty: 8 | Refills: 0
Start: 2023-12-07

## 2023-12-07 RX ADMIN — HEPARIN SODIUM 5000 UNIT(S): 5000 INJECTION INTRAVENOUS; SUBCUTANEOUS at 14:15

## 2023-12-07 RX ADMIN — POLYETHYLENE GLYCOL 3350 17 GRAM(S): 17 POWDER, FOR SOLUTION ORAL at 11:40

## 2023-12-07 RX ADMIN — HEPARIN SODIUM 5000 UNIT(S): 5000 INJECTION INTRAVENOUS; SUBCUTANEOUS at 05:44

## 2023-12-07 RX ADMIN — PANTOPRAZOLE SODIUM 40 MILLIGRAM(S): 20 TABLET, DELAYED RELEASE ORAL at 11:40

## 2023-12-07 RX ADMIN — Medication 600 MILLIGRAM(S): at 05:44

## 2023-12-07 RX ADMIN — Medication 600 MILLIGRAM(S): at 00:02

## 2023-12-07 RX ADMIN — POLYETHYLENE GLYCOL 3350 17 GRAM(S): 17 POWDER, FOR SOLUTION ORAL at 09:12

## 2023-12-07 NOTE — PROGRESS NOTE ADULT - PROVIDER SPECIALTY LIST ADULT
Surgery
Hospitalist
Hospitalist
Surgery
Hospitalist
Surgery
Hospitalist

## 2023-12-07 NOTE — DISCHARGE NOTE NURSING/CASE MANAGEMENT/SOCIAL WORK - CAREGIVER ADDRESS
[address] [place] [address] [address] [facility] [address] [place] [address] [address] [place]

## 2023-12-07 NOTE — PROGRESS NOTE ADULT - PROBLEM SELECTOR PLAN 6
h/o melanomas and SCCs, follows at [facility]  - planned for melanoma resection on Friday. h/o melanomas and SCCs, follows at [facility]  - planned for melanoma resection on Friday.

## 2023-12-07 NOTE — PROGRESS NOTE ADULT - PROBLEM SELECTOR PLAN 3
- chronic, does not take statin (stopped atorvastatin 20 about 9months ago)  - f/u outpatient for lipid panel and continuation of statin
- chronic, does not take statin (stopped atorvastatin 20 about 9months ago)  - f/u outpatient for lipid panel and continuation of statin
- patient denies any history of HTN  - BPs have been elevated here   - consider amlodipine 5mg qD if persists   - Monitor vitals.

## 2023-12-07 NOTE — PROGRESS NOTE ADULT - ASSESSMENT
Pt. has no real complaints.  Tolerates diet.  Plenty of flatus.  Abd. exam is benign. Wound is clean. Will start on Miralax. Anticipate discharge today.

## 2023-12-07 NOTE — PROGRESS NOTE ADULT - PROBLEM SELECTOR PLAN 5
- follows with [facility] outpatient for yearly colonoscopy/endoscopies (developed 2/2 9/11 exposure)  - continue 40mg omeprazole daily. - follows with [facility] outpatient for yearly colonoscopy/endoscopies (developed 2/2 9/11 exposure)  - continue 40mg omeprazole daily.

## 2023-12-07 NOTE — PROGRESS NOTE ADULT - PROBLEM SELECTOR PLAN 1
- s/p umbilical hernia about 15 years ago, has had h/o multiple SBOs all tx with NG tube, now electing enterolysis  -POD#3 s/p JENNIFER, with small bowel resection and primary anastamosis  -Tolerating diet  - continue diet per surgery  -D/C planning per primary team
- s/p umbilical hernia about 15 years ago, has had h/o multiple SBOs all tx with NG tube, now electing enterolysis to be performed on 12/4.   - Admitted by Dr. Blum  - Symptoms resolved, s/p NGT   -Tolerating diet. Having BM  - continue diet per surgery
- s/p umbilical hernia about 15 years ago, has had h/o multiple SBOs all tx with NG tube, now electing enterolysis  -POD#2 s/p JENNIFER, with small bowel resection and primary anastamosis  -Tolerating diet  - continue diet per surgery  -D/C planning per primary team

## 2023-12-07 NOTE — PROGRESS NOTE ADULT - PROBLEM SELECTOR PLAN 2
- patient denies any history of HTN  - BPs have been elevated here   - consider amlodipine 5mg qD if persists   - Monitor vitals.
- patient denies any history of HTN  - BPs have been elevated here   - consider amlodipine 5mg qD if persists   - Monitor vitals.
- Patient is medically cleared for surgery on 12/4   - METS >4, denied any cardiac history  - Monitor resp status 2/2 asthma. Spo2 appropriate on RA.

## 2023-12-07 NOTE — PROGRESS NOTE ADULT - PROBLEM SELECTOR PLAN 8
- DVT ppx per surgery.
- DVT ppx per surgery.
since significant weight loss this year, he is mostly asx and uses CPAP rarely   - BPs have been elevated here, he states likely 2/2 anxiety before procedure  - O2 sats within NL, no desats noted   - consider encouragement of CPAP if blood pressures remain elevated.

## 2023-12-07 NOTE — DISCHARGE NOTE NURSING/CASE MANAGEMENT/SOCIAL WORK - NSDCPEFALRISK_GEN_ALL_CORE
For information on Fall & Injury Prevention, visit: [email or web address] OR  [email or web address] OR  https://www.cdc.gov/steadi/patient.html For information on Fall & Injury Prevention, visit: [email or web address] OR  [email or web address] OR  https://www.cdc.gov/steadi/patient.html

## 2023-12-07 NOTE — DISCHARGE NOTE NURSING/CASE MANAGEMENT/SOCIAL WORK - PATIENT PORTAL LINK FT
You can access the FollowMyHealth Patient Portal offered by [facility] by registering at the following website: [email or web address]. By joining [employer]’s FollowMyHealth portal, you will also be able to view your health information using other applications (apps) compatible with our system. You can access the FollowMyHealth Patient Portal offered by [facility] by registering at the following website: [email or web address]. By joining [employer]’s FollowMyHealth portal, you will also be able to view your health information using other applications (apps) compatible with our system.

## 2023-12-07 NOTE — PROGRESS NOTE ADULT - SUBJECTIVE AND OBJECTIVE BOX
INTERVAL HPI/OVERNIGHT EVENTS: Patient seen and examined at bedside. States he is feeling well. Passing flatus, but has not had a BM yet. States he is ambulating well tolerating diet, denies any severe abdominal pain. Complains of occasional abdominal soreness.    ROS: All other review of systems is negative unless indicated above.    MEDICATIONS  (STANDING):  acetaminophen     Tablet .. 1000 milliGRAM(s) Oral every 6 hours  heparin   Injectable 5000 Unit(s) SubCutaneous every 8 hours  ibuprofen  Tablet. 600 milliGRAM(s) Oral every 6 hours  influenza  Vaccine (HIGH DOSE) 0.7 milliLiter(s) IntraMuscular once  pantoprazole  Injectable 40 milliGRAM(s) IV Push daily  polyethylene glycol 3350 17 Gram(s) Oral daily    MEDICATIONS  (PRN):  albuterol    90 MICROgram(s) HFA Inhaler 1 Puff(s) Inhalation every 6 hours PRN Shortness of Breath and/or Wheezing  oxyCODONE    IR 5 milliGRAM(s) Oral every 4 hours PRN Moderate Pain (4 - 6)  oxyCODONE    IR 10 milliGRAM(s) Oral every 4 hours PRN Severe Pain (7 - 10)  senna 1 Tablet(s) Oral daily PRN Constipation      Allergies    No Known Allergies    Intolerances          Vital Signs Last 24 Hrs  T(C): 36.6 (07 Dec 2023 12:32), Max: 36.6 (06 Dec 2023 19:46)  T(F): 97.9 (07 Dec 2023 12:32), Max: 97.9 (07 Dec 2023 12:32)  HR: 70 (07 Dec 2023 12:32) (68 - 76)  BP: 143/79 (07 Dec 2023 12:32) (115/72 - 143/79)  BP(mean): --  RR: 18 (07 Dec 2023 12:32) (18 - 18)  SpO2: 97% (07 Dec 2023 12:32) (95% - 98%)    Parameters below as of 07 Dec 2023 12:32  Patient On (Oxygen Delivery Method): room air        Physical Exam:  General: sitting comfortably in bed, NAD  Neurology: A&Ox3, nonfocal, MOMIN x 4  Respiratory: CTA B/L  CV: RRR, S1S2, no murmurs, rubs or gallops  Abdominal: Soft, +mild TTP over incision site, no rebound, no guarding, +mild distention, hypoactive BS  Extremities: No edema, + peripheral pulses        LABS:                RADIOLOGY & ADDITIONAL TESTS:

## 2023-12-07 NOTE — PROGRESS NOTE ADULT - PROBLEM SELECTOR PLAN 4
- new diagnosis after 9/11  - has inhaler and uses it rarely (1x/month when aggravated by pollutant)  - follows closely with pulm outpatient  - physically active at home with no complaints or SOB.
- chronic, does not take statin (stopped atorvastatin 20 about 9months ago)  - f/u outpatient for lipid panel and continuation of stati
- new diagnosis after 9/11  - has inhaler and uses it rarely (1x/month when aggravated by pollutant)  - follows closely with pulm outpatient  - physically active at home with no complaints or SOB.

## 2023-12-07 NOTE — PROGRESS NOTE ADULT - TIME BILLING
Reviewing chart notes and data, face to face time counseling the patient, coordinating care with SW/CM at [facility]. Reviewing chart notes and data, face to face time counseling the patient, coordinating care with SW/CM at [facility].

## 2023-12-14 ENCOUNTER — APPOINTMENT (OUTPATIENT)
Dept: SURGERY | Facility: CLINIC | Age: 63
End: 2023-12-14
Payer: MEDICARE

## 2023-12-14 VITALS
BODY MASS INDEX: 34.96 KG/M2 | DIASTOLIC BLOOD PRESSURE: 80 MMHG | SYSTOLIC BLOOD PRESSURE: 132 MMHG | HEART RATE: 77 BPM | OXYGEN SATURATION: 100 % | RESPIRATION RATE: 16 BRPM | WEIGHT: 236 LBS | HEIGHT: 69 IN

## 2023-12-14 PROCEDURE — 99024 POSTOP FOLLOW-UP VISIT: CPT

## 2023-12-18 ENCOUNTER — RESULT REVIEW (OUTPATIENT)
Age: 63
End: 2023-12-18

## 2023-12-18 ENCOUNTER — APPOINTMENT (OUTPATIENT)
Dept: CT IMAGING | Facility: CLINIC | Age: 63
End: 2023-12-18
Payer: MEDICARE

## 2023-12-18 ENCOUNTER — APPOINTMENT (OUTPATIENT)
Dept: MRI IMAGING | Facility: CLINIC | Age: 63
End: 2023-12-18
Payer: MEDICARE

## 2023-12-18 PROCEDURE — 76376 3D RENDER W/INTRP POSTPROCES: CPT

## 2023-12-18 PROCEDURE — 73200 CT UPPER EXTREMITY W/O DYE: CPT | Mod: LT

## 2023-12-18 PROCEDURE — 73221 MRI JOINT UPR EXTREM W/O DYE: CPT | Mod: LT

## 2024-01-09 ENCOUNTER — APPOINTMENT (OUTPATIENT)
Dept: ORTHOPEDIC SURGERY | Facility: CLINIC | Age: 64
End: 2024-01-09
Payer: MEDICARE

## 2024-01-09 PROCEDURE — 99441: CPT | Mod: 93

## 2024-01-16 ENCOUNTER — APPOINTMENT (OUTPATIENT)
Dept: SURGERY | Facility: CLINIC | Age: 64
End: 2024-01-16
Payer: MEDICARE

## 2024-01-16 PROCEDURE — 99215 OFFICE O/P EST HI 40 MIN: CPT | Mod: 24

## 2024-01-31 ENCOUNTER — OUTPATIENT (OUTPATIENT)
Dept: OUTPATIENT SERVICES | Facility: HOSPITAL | Age: 64
LOS: 1 days | End: 2024-01-31
Payer: MEDICARE

## 2024-01-31 VITALS
OXYGEN SATURATION: 99 % | TEMPERATURE: 98 F | HEART RATE: 76 BPM | DIASTOLIC BLOOD PRESSURE: 81 MMHG | SYSTOLIC BLOOD PRESSURE: 133 MMHG | RESPIRATION RATE: 16 BRPM | HEIGHT: 68 IN | WEIGHT: 242.07 LBS

## 2024-01-31 DIAGNOSIS — Z01.818 ENCOUNTER FOR OTHER PREPROCEDURAL EXAMINATION: ICD-10-CM

## 2024-01-31 DIAGNOSIS — Z98.1 ARTHRODESIS STATUS: Chronic | ICD-10-CM

## 2024-01-31 DIAGNOSIS — Z96.611 PRESENCE OF RIGHT ARTIFICIAL SHOULDER JOINT: Chronic | ICD-10-CM

## 2024-01-31 DIAGNOSIS — M19.012 PRIMARY OSTEOARTHRITIS, LEFT SHOULDER: ICD-10-CM

## 2024-01-31 DIAGNOSIS — Z96.651 PRESENCE OF RIGHT ARTIFICIAL KNEE JOINT: Chronic | ICD-10-CM

## 2024-01-31 DIAGNOSIS — Z98.890 OTHER SPECIFIED POSTPROCEDURAL STATES: Chronic | ICD-10-CM

## 2024-01-31 DIAGNOSIS — Z90.49 ACQUIRED ABSENCE OF OTHER SPECIFIED PARTS OF DIGESTIVE TRACT: Chronic | ICD-10-CM

## 2024-01-31 DIAGNOSIS — Z98.89 OTHER SPECIFIED POSTPROCEDURAL STATES: Chronic | ICD-10-CM

## 2024-01-31 LAB
A1C WITH ESTIMATED AVERAGE GLUCOSE RESULT: 5.1 % — SIGNIFICANT CHANGE UP (ref 4–5.6)
ALBUMIN SERPL ELPH-MCNC: 3.7 G/DL — SIGNIFICANT CHANGE UP (ref 3.3–5)
ALP SERPL-CCNC: 60 U/L — SIGNIFICANT CHANGE UP (ref 40–120)
ALT FLD-CCNC: 26 U/L — SIGNIFICANT CHANGE UP (ref 12–78)
ANION GAP SERPL CALC-SCNC: 1 MMOL/L — LOW (ref 5–17)
APPEARANCE UR: CLEAR — SIGNIFICANT CHANGE UP
APTT BLD: 33.1 SEC — SIGNIFICANT CHANGE UP (ref 24.5–35.6)
AST SERPL-CCNC: 17 U/L — SIGNIFICANT CHANGE UP (ref 15–37)
BASOPHILS # BLD AUTO: 0.06 K/UL — SIGNIFICANT CHANGE UP (ref 0–0.2)
BASOPHILS NFR BLD AUTO: 0.7 % — SIGNIFICANT CHANGE UP (ref 0–2)
BILIRUB SERPL-MCNC: 0.7 MG/DL — SIGNIFICANT CHANGE UP (ref 0.2–1.2)
BILIRUB UR-MCNC: NEGATIVE — SIGNIFICANT CHANGE UP
BUN SERPL-MCNC: 13 MG/DL — SIGNIFICANT CHANGE UP (ref 7–23)
CALCIUM SERPL-MCNC: 9.5 MG/DL — SIGNIFICANT CHANGE UP (ref 8.5–10.1)
CHLORIDE SERPL-SCNC: 102 MMOL/L — SIGNIFICANT CHANGE UP (ref 96–108)
CO2 SERPL-SCNC: 31 MMOL/L — SIGNIFICANT CHANGE UP (ref 22–31)
COLOR SPEC: YELLOW — SIGNIFICANT CHANGE UP
CREAT SERPL-MCNC: 0.88 MG/DL — SIGNIFICANT CHANGE UP (ref 0.5–1.3)
DIFF PNL FLD: NEGATIVE — SIGNIFICANT CHANGE UP
EGFR: 95 ML/MIN/1.73M2 — SIGNIFICANT CHANGE UP
EOSINOPHIL # BLD AUTO: 0.18 K/UL — SIGNIFICANT CHANGE UP (ref 0–0.5)
EOSINOPHIL NFR BLD AUTO: 2.1 % — SIGNIFICANT CHANGE UP (ref 0–6)
ESTIMATED AVERAGE GLUCOSE: 100 MG/DL — SIGNIFICANT CHANGE UP (ref 68–114)
GLUCOSE SERPL-MCNC: 100 MG/DL — HIGH (ref 70–99)
GLUCOSE UR QL: NEGATIVE MG/DL — SIGNIFICANT CHANGE UP
HCT VFR BLD CALC: 44.7 % — SIGNIFICANT CHANGE UP (ref 39–50)
HGB BLD-MCNC: 15.1 G/DL — SIGNIFICANT CHANGE UP (ref 13–17)
IMM GRANULOCYTES NFR BLD AUTO: 0.3 % — SIGNIFICANT CHANGE UP (ref 0–0.9)
INR BLD: 0.94 RATIO — SIGNIFICANT CHANGE UP (ref 0.85–1.18)
KETONES UR-MCNC: NEGATIVE MG/DL — SIGNIFICANT CHANGE UP
LEUKOCYTE ESTERASE UR-ACNC: NEGATIVE — SIGNIFICANT CHANGE UP
LYMPHOCYTES # BLD AUTO: 1.95 K/UL — SIGNIFICANT CHANGE UP (ref 1–3.3)
LYMPHOCYTES # BLD AUTO: 22.7 % — SIGNIFICANT CHANGE UP (ref 13–44)
MCHC RBC-ENTMCNC: 31.9 PG — SIGNIFICANT CHANGE UP (ref 27–34)
MCHC RBC-ENTMCNC: 33.8 GM/DL — SIGNIFICANT CHANGE UP (ref 32–36)
MCV RBC AUTO: 94.5 FL — SIGNIFICANT CHANGE UP (ref 80–100)
MONOCYTES # BLD AUTO: 0.5 K/UL — SIGNIFICANT CHANGE UP (ref 0–0.9)
MONOCYTES NFR BLD AUTO: 5.8 % — SIGNIFICANT CHANGE UP (ref 2–14)
MRSA PCR RESULT.: SIGNIFICANT CHANGE UP
NEUTROPHILS # BLD AUTO: 5.88 K/UL — SIGNIFICANT CHANGE UP (ref 1.8–7.4)
NEUTROPHILS NFR BLD AUTO: 68.4 % — SIGNIFICANT CHANGE UP (ref 43–77)
NITRITE UR-MCNC: NEGATIVE — SIGNIFICANT CHANGE UP
PH UR: 6.5 — SIGNIFICANT CHANGE UP (ref 5–8)
PLATELET # BLD AUTO: 220 K/UL — SIGNIFICANT CHANGE UP (ref 150–400)
POTASSIUM SERPL-MCNC: 3.9 MMOL/L — SIGNIFICANT CHANGE UP (ref 3.5–5.3)
POTASSIUM SERPL-SCNC: 3.9 MMOL/L — SIGNIFICANT CHANGE UP (ref 3.5–5.3)
PROT SERPL-MCNC: 7.2 GM/DL — SIGNIFICANT CHANGE UP (ref 6–8.3)
PROT UR-MCNC: NEGATIVE MG/DL — SIGNIFICANT CHANGE UP
PROTHROM AB SERPL-ACNC: 10.6 SEC — SIGNIFICANT CHANGE UP (ref 9.5–13)
RBC # BLD: 4.73 M/UL — SIGNIFICANT CHANGE UP (ref 4.2–5.8)
RBC # FLD: 12.7 % — SIGNIFICANT CHANGE UP (ref 10.3–14.5)
S AUREUS DNA NOSE QL NAA+PROBE: SIGNIFICANT CHANGE UP
SODIUM SERPL-SCNC: 134 MMOL/L — LOW (ref 135–145)
SP GR SPEC: 1 — SIGNIFICANT CHANGE UP (ref 1–1.03)
UROBILINOGEN FLD QL: 0.2 MG/DL — SIGNIFICANT CHANGE UP (ref 0.2–1)
WBC # BLD: 8.6 K/UL — SIGNIFICANT CHANGE UP (ref 3.8–10.5)
WBC # FLD AUTO: 8.6 K/UL — SIGNIFICANT CHANGE UP (ref 3.8–10.5)

## 2024-01-31 PROCEDURE — 93010 ELECTROCARDIOGRAM REPORT: CPT

## 2024-01-31 PROCEDURE — 83036 HEMOGLOBIN GLYCOSYLATED A1C: CPT

## 2024-01-31 PROCEDURE — 87641 MR-STAPH DNA AMP PROBE: CPT

## 2024-01-31 PROCEDURE — 85025 COMPLETE CBC W/AUTO DIFF WBC: CPT

## 2024-01-31 PROCEDURE — 87640 STAPH A DNA AMP PROBE: CPT

## 2024-01-31 PROCEDURE — 85730 THROMBOPLASTIN TIME PARTIAL: CPT

## 2024-01-31 PROCEDURE — 80053 COMPREHEN METABOLIC PANEL: CPT

## 2024-01-31 PROCEDURE — 85610 PROTHROMBIN TIME: CPT

## 2024-01-31 PROCEDURE — 93005 ELECTROCARDIOGRAM TRACING: CPT

## 2024-01-31 PROCEDURE — 99214 OFFICE O/P EST MOD 30 MIN: CPT | Mod: 25

## 2024-01-31 PROCEDURE — 36415 COLL VENOUS BLD VENIPUNCTURE: CPT

## 2024-01-31 PROCEDURE — 81003 URINALYSIS AUTO W/O SCOPE: CPT

## 2024-01-31 NOTE — H&P PST ADULT - HISTORY OF PRESENT ILLNESS
63 year old male with OA left shoulder c/o pain and limited ROM; denies numbness and tingling; he presents to PST for planned left shoulder replacement

## 2024-01-31 NOTE — H&P PST ADULT - NSICDXPASTMEDICALHX_GEN_ALL_CORE_FT
PAST MEDICAL HISTORY:  Arthritis     Asthma WT responder--followed by [facility] monitoring center    BPH (benign prostatic hyperplasia)     Chronic sinusitis     GERD (gastroesophageal reflux disease)     H/O melanoma excision Left thigh    H/O small bowel obstruction     H/O squamous cell carcinoma excision left hand    History of Thomson's esophagus     Hyperlipidemia     Obese     Sleep apnea CPAP     PAST MEDICAL HISTORY:  Arthritis     Asthma WT responder--followed by [facility] monitoring center    BPH (benign prostatic hyperplasia)     Chronic sinusitis     GERD (gastroesophageal reflux disease)     H/O melanoma excision Left thigh    H/O small bowel obstruction     H/O squamous cell carcinoma excision left hand    Hearing loss     History of Thomson's esophagus     Hyperlipidemia     Obese     Sleep apnea CPAP

## 2024-01-31 NOTE — H&P PST ADULT - NS MD HP INPLANTS MED DEV
right TKR right shoulder replacement lumbar fusion/Artificial joint right TKR right shoulder replacement lumbar fusion/Artificial joint/Hearing aid

## 2024-01-31 NOTE — H&P PST ADULT - NSICDXPASTSURGICALHX_GEN_ALL_CORE_FT
PAST SURGICAL HISTORY:  H/O cardiac catheterization 7-8 yr ago---normal    H/O resection of small bowel     H/O total shoulder replacement, right     H/O umbilical hernia repair 2005    S/P inguinal hernia repair right--1988    S/P lumbar fusion L3-L4  ---2015    S/P total knee replacement, right 2018

## 2024-01-31 NOTE — H&P PST ADULT - ASSESSMENT
63 year old male with OA left shoulder c/o pain and limited ROM; denies numbness and tingling; he presents to PST for planned left shoulder replacement       Plan:  1. PST instructions given ; NPO status/  instructions to be given by ASU   2. Pt instructed to take following meds on day of surgery: omeprazole and advair; albuterol prn   3. Pt instructed to take routine evening medications unless indicated   4. Stop NSAIDS ( Aspirin Alev Motrin Mobic Diclofenac), herbal supplements , MVI , Vitamin fish oil 7 days prior to surgery  unless   directed by surgeon or cardiologist;   5. Medical Optimization  with Dr Hickman  6. EZ wash instructions given & mupirocin instructions given  7. Labs EKG  as per surgeon request   8. Pt denies covid symptoms shortness of breath fever cough       CAPRINI VTE 2.0 SCORE [CLOT updated 2019]    AGE RELATED RISK FACTORS                                                       MOBILITY RELATED FACTORS  [ ] Age 41-60 years                                            (1 Point)                    [ ] Bed rest                                                        (1 Point)  [x ] Age: 61-74 years                                           (2 Points)                  [ ] Plaster cast                                                   (2 Points)  [ ] Age= 75 years                                              (3 Points)                    [ ] Bed bound for more than 72 hours                 (2 Points)    DISEASE RELATED RISK FACTORS                                               GENDER SPECIFIC FACTORS  [ ] Edema in the lower extremities                       (1 Point)              [ ] Pregnancy                                                     (1 Point)  [ ] Varicose veins                                               (1 Point)                     [ ] Post-partum < 6 weeks                                   (1 Point)             [ ] BMI > 25 Kg/m2                                            (1 Point)                     [ ] Hormonal therapy  or oral contraception          (1 Point)                 [x ] Sepsis (in the previous month)                        (1 Point)               [ ] History of pregnancy complications                 (1 point)  [ ] Pneumonia or serious lung disease                                               [ ] Unexplained or recurrent                     (1 Point)           (in the previous month)                               (1 Point)  [ ] Abnormal pulmonary function test                     (1 Point)                 SURGERY RELATED RISK FACTORS  [ ] Acute myocardial infarction                              (1 Point)               [ ]  Section                                             (1 Point)  [ ] Congestive heart failure (in the previous month)  (1 Point)      [ ] Minor surgery                                                  (1 Point)   [ ] Inflammatory bowel disease                             (1 Point)               [ ] Arthroscopic surgery                                        (2 Points)  [ ] Central venous access                                      (2 Points)                [ ] General surgery lasting more than 45 minutes (2 points)  [x ] Malignancy- Present or previous                   (2 Points)                [x ] Elective arthroplasty                                         (5 points)    [ ] Stroke (in the previous month)                          (5 Points)                                                                                                                                                           HEMATOLOGY RELATED FACTORS                                                 TRAUMA RELATED RISK FACTORS  [ ] Prior episodes of VTE                                     (3 Points)                [ ] Fracture of the hip, pelvis, or leg                       (5 Points)  [ ] Positive family history for VTE                         (3 Points)             [ ] Acute spinal cord injury (in the previous month)  (5 Points)  [ ] Prothrombin 22521 A                                     (3 Points)               [ ] Paralysis  (less than 1 month)                             (5 Points)  [ ] Factor V Leiden                                             (3 Points)                  [ ] Multiple Trauma within 1 month                        (5 Points)  [ ] Lupus anticoagulants                                     (3 Points)                                                           [ ] Anticardiolipin antibodies                               (3 Points)                                                       [ ] High homocysteine in the blood                      (3 Points)                                             [ ] Other congenital or acquired thrombophilia      (3 Points)                                                [ ] Heparin induced thrombocytopenia                  (3 Points)                                     Total Score [        10  ]    The Caprini score indicates that this patient is at high risk for a VTE event (score 6 or greater). Surgical patients in this group will benefit from both pharmacologic prophylaxis and intermittent compression devices.  The surgical team will determine the balance between VTE risk and bleeding risk, and other clinical considerations

## 2024-02-01 DIAGNOSIS — Z01.818 ENCOUNTER FOR OTHER PREPROCEDURAL EXAMINATION: ICD-10-CM

## 2024-02-01 DIAGNOSIS — M19.012 PRIMARY OSTEOARTHRITIS, LEFT SHOULDER: ICD-10-CM

## 2024-02-05 ENCOUNTER — APPOINTMENT (OUTPATIENT)
Dept: INTERNAL MEDICINE | Facility: CLINIC | Age: 64
End: 2024-02-05
Payer: MEDICARE

## 2024-02-05 VITALS
WEIGHT: 240 LBS | OXYGEN SATURATION: 99 % | TEMPERATURE: 97.7 F | DIASTOLIC BLOOD PRESSURE: 80 MMHG | BODY MASS INDEX: 36.37 KG/M2 | SYSTOLIC BLOOD PRESSURE: 130 MMHG | HEART RATE: 83 BPM | HEIGHT: 68 IN

## 2024-02-05 DIAGNOSIS — K22.70 BARRETT'S ESOPHAGUS W/OUT DYSPLASIA: ICD-10-CM

## 2024-02-05 DIAGNOSIS — N40.0 BENIGN PROSTATIC HYPERPLASIA WITHOUT LOWER URINARY TRACT SYMPMS: ICD-10-CM

## 2024-02-05 DIAGNOSIS — G47.30 SLEEP APNEA, UNSPECIFIED: ICD-10-CM

## 2024-02-05 DIAGNOSIS — Z87.438 PERSONAL HISTORY OF OTHER DISEASES OF MALE GENITAL ORGANS: ICD-10-CM

## 2024-02-05 DIAGNOSIS — C43.9 MALIGNANT MELANOMA OF SKIN, UNSPECIFIED: ICD-10-CM

## 2024-02-05 DIAGNOSIS — Z78.9 OTHER SPECIFIED HEALTH STATUS: ICD-10-CM

## 2024-02-05 DIAGNOSIS — Z01.818 ENCOUNTER FOR OTHER PREPROCEDURAL EXAMINATION: ICD-10-CM

## 2024-02-05 DIAGNOSIS — K56.609 UNSPECIFIED INTESTINAL OBSTRUCTION, UNSPECIFIED AS TO PARTIAL VERSUS COMPLETE OBSTRUCTION: ICD-10-CM

## 2024-02-05 DIAGNOSIS — J45.909 UNSPECIFIED ASTHMA, UNCOMPLICATED: ICD-10-CM

## 2024-02-05 DIAGNOSIS — M19.012 PRIMARY OSTEOARTHRITIS, LEFT SHOULDER: ICD-10-CM

## 2024-02-05 PROBLEM — Z87.19 PERSONAL HISTORY OF OTHER DISEASES OF THE DIGESTIVE SYSTEM: Chronic | Status: ACTIVE | Noted: 2024-01-31

## 2024-02-05 PROBLEM — E66.9 OBESITY, UNSPECIFIED: Chronic | Status: ACTIVE | Noted: 2024-01-31

## 2024-02-05 PROBLEM — H91.90 UNSPECIFIED HEARING LOSS, UNSPECIFIED EAR: Chronic | Status: ACTIVE | Noted: 2024-01-31

## 2024-02-05 PROBLEM — J32.9 CHRONIC SINUSITIS, UNSPECIFIED: Chronic | Status: ACTIVE | Noted: 2024-01-31

## 2024-02-05 PROCEDURE — 99214 OFFICE O/P EST MOD 30 MIN: CPT

## 2024-02-05 RX ORDER — FLUTICASONE PROPIONATE AND SALMETEROL XINAFOATE 230; 21 UG/1; UG/1
AEROSOL, METERED RESPIRATORY (INHALATION)
Refills: 0 | Status: ACTIVE | COMMUNITY

## 2024-02-06 ENCOUNTER — APPOINTMENT (OUTPATIENT)
Dept: CARDIOLOGY | Facility: CLINIC | Age: 64
End: 2024-02-06
Payer: MEDICARE

## 2024-02-06 VITALS
OXYGEN SATURATION: 99 % | HEART RATE: 80 BPM | BODY MASS INDEX: 37.44 KG/M2 | DIASTOLIC BLOOD PRESSURE: 84 MMHG | SYSTOLIC BLOOD PRESSURE: 148 MMHG | WEIGHT: 247 LBS | HEIGHT: 68 IN

## 2024-02-06 DIAGNOSIS — Z01.810 ENCOUNTER FOR PREPROCEDURAL CARDIOVASCULAR EXAMINATION: ICD-10-CM

## 2024-02-06 PROBLEM — J45.909 ASTHMA: Status: ACTIVE | Noted: 2024-02-06

## 2024-02-06 PROBLEM — Z78.9 NON-SMOKER: Status: ACTIVE | Noted: 2024-02-06

## 2024-02-06 PROBLEM — Z01.818 PREOP TESTING: Status: ACTIVE | Noted: 2021-03-06

## 2024-02-06 PROBLEM — N40.0 BPH (BENIGN PROSTATIC HYPERPLASIA): Status: ACTIVE | Noted: 2024-02-06

## 2024-02-06 PROBLEM — Z78.9 CONSUMES ALCOHOL OCCASIONALLY: Status: ACTIVE | Noted: 2024-02-06

## 2024-02-06 PROBLEM — Z87.438 HISTORY OF BPH: Status: ACTIVE | Noted: 2024-02-06

## 2024-02-06 PROBLEM — K22.70 BARRETT'S ESOPHAGUS: Status: ACTIVE | Noted: 2024-02-06

## 2024-02-06 PROBLEM — K56.609 SBO (SMALL BOWEL OBSTRUCTION): Status: ACTIVE | Noted: 2024-02-06

## 2024-02-06 PROBLEM — C43.9 MELANOMA: Status: ACTIVE | Noted: 2024-02-06

## 2024-02-06 PROBLEM — M19.012 PRIMARY OSTEOARTHRITIS OF LEFT SHOULDER: Status: ACTIVE | Noted: 2023-11-28

## 2024-02-06 PROBLEM — G47.30 SLEEP APNEA: Status: ACTIVE | Noted: 2024-02-06

## 2024-02-06 PROCEDURE — 93000 ELECTROCARDIOGRAM COMPLETE: CPT | Mod: NC

## 2024-02-06 PROCEDURE — 99204 OFFICE O/P NEW MOD 45 MIN: CPT | Mod: 25

## 2024-02-06 RX ORDER — OXYCODONE HYDROCHLORIDE 5 MG/1
10 TABLET ORAL EVERY 4 HOURS
Refills: 0 | Status: DISCONTINUED | OUTPATIENT
Start: 2024-02-07 | End: 2024-02-09

## 2024-02-06 RX ORDER — OXYCODONE HYDROCHLORIDE 5 MG/1
5 TABLET ORAL EVERY 4 HOURS
Refills: 0 | Status: DISCONTINUED | OUTPATIENT
Start: 2024-02-07 | End: 2024-02-09

## 2024-02-06 RX ORDER — OXYCODONE 5 MG/1
5 TABLET ORAL
Qty: 30 | Refills: 0 | Status: DISCONTINUED | COMMUNITY
Start: 2021-03-15 | End: 2024-02-06

## 2024-02-06 RX ORDER — PROCHLORPERAZINE MALEATE 5 MG
10 TABLET ORAL EVERY 8 HOURS
Refills: 0 | Status: DISCONTINUED | OUTPATIENT
Start: 2024-02-07 | End: 2024-02-09

## 2024-02-06 RX ORDER — SODIUM CHLORIDE 9 MG/ML
1000 INJECTION, SOLUTION INTRAVENOUS
Refills: 0 | Status: DISCONTINUED | OUTPATIENT
Start: 2024-02-07 | End: 2024-02-09

## 2024-02-06 RX ORDER — MELOXICAM 7.5 MG/1
7.5 TABLET ORAL DAILY
Qty: 21 | Refills: 0 | Status: DISCONTINUED | COMMUNITY
Start: 2021-03-23 | End: 2024-02-06

## 2024-02-06 RX ORDER — POLYETHYLENE GLYCOL 3350 17 G/17G
17 POWDER, FOR SOLUTION ORAL AT BEDTIME
Refills: 0 | Status: DISCONTINUED | OUTPATIENT
Start: 2024-02-07 | End: 2024-02-09

## 2024-02-06 RX ORDER — SENNA PLUS 8.6 MG/1
2 TABLET ORAL AT BEDTIME
Refills: 0 | Status: DISCONTINUED | OUTPATIENT
Start: 2024-02-07 | End: 2024-02-09

## 2024-02-06 RX ORDER — TRANEXAMIC ACID 100 MG/ML
1000 INJECTION, SOLUTION INTRAVENOUS ONCE
Refills: 0 | Status: DISCONTINUED | OUTPATIENT
Start: 2024-02-07 | End: 2024-02-09

## 2024-02-06 RX ORDER — ONDANSETRON 8 MG/1
8 TABLET, FILM COATED ORAL EVERY 8 HOURS
Refills: 0 | Status: DISCONTINUED | OUTPATIENT
Start: 2024-02-07 | End: 2024-02-09

## 2024-02-06 RX ORDER — HYDROMORPHONE HYDROCHLORIDE 2 MG/ML
0.5 INJECTION INTRAMUSCULAR; INTRAVENOUS; SUBCUTANEOUS EVERY 4 HOURS
Refills: 0 | Status: DISCONTINUED | OUTPATIENT
Start: 2024-02-07 | End: 2024-02-09

## 2024-02-06 RX ORDER — PANTOPRAZOLE SODIUM 20 MG/1
40 TABLET, DELAYED RELEASE ORAL
Refills: 0 | Status: DISCONTINUED | OUTPATIENT
Start: 2024-02-07 | End: 2024-02-09

## 2024-02-06 RX ORDER — ASPIRIN/CALCIUM CARB/MAGNESIUM 324 MG
325 TABLET ORAL DAILY
Refills: 0 | Status: DISCONTINUED | OUTPATIENT
Start: 2024-02-08 | End: 2024-02-09

## 2024-02-06 RX ORDER — ACETAMINOPHEN 500 MG
1000 TABLET ORAL EVERY 8 HOURS
Refills: 0 | Status: DISCONTINUED | OUTPATIENT
Start: 2024-02-07 | End: 2024-02-09

## 2024-02-06 RX ORDER — CELECOXIB 200 MG/1
200 CAPSULE ORAL EVERY 12 HOURS
Refills: 0 | Status: DISCONTINUED | OUTPATIENT
Start: 2024-02-07 | End: 2024-02-09

## 2024-02-06 NOTE — HISTORY OF PRESENT ILLNESS
[No Pertinent Cardiac History] : no history of aortic stenosis, atrial fibrillation, coronary artery disease, recent myocardial infarction, or implantable device/pacemaker [Asthma] : asthma [Sleep Apnea] : sleep apnea [(Patient denies any chest pain, claudication, dyspnea on exertion, orthopnea, palpitations or syncope)] : Patient denies any chest pain, claudication, dyspnea on exertion, orthopnea, palpitations or syncope [Moderate (4-6 METs)] : Moderate (4-6 METs) [COPD] : no COPD [Smoker] : not a smoker [Family Member] : no family member with adverse anesthesia reaction/sudden death [Self] : no previous adverse anesthesia reaction [Chronic Anticoagulation] : no chronic anticoagulation [Chronic Kidney Disease] : no chronic kidney disease [Diabetes] : no diabetes [FreeTextEntry1] : left shoulder replacement. [FreeTextEntry2] : 2/7/2024 [FreeTextEntry3] : Dr. Chaudhary  [FreeTextEntry4] : 63M PMH multiple SBOs secondary to adhesions after umbilical hernia repair 15 years ago, asthma, Thomson's esophagus, MARY ANN, BPH for preop for left shoulder replacement due to OA on 2/7/2024 at [facility] with Dr. Chaudhary.   In regard to surgical risk, the patient has a good functional capacity for his age. He is able to go up 3-4 flights of stairs without chest pain and shortness of breath. He does not use any assistive ambulatory devices. He reports he is very active and is fully independent.  The patient denies any drug allergies. He is not an easy bleeder and is not on anticoagulation. He has not been on chronic immunosuppression such as chronic prednisone. He reports testing positive for sleep apnea and wears his cpap machine more consistently now that he got a new, more comfortable one.   The patient denies signs and symptoms of active infection within the last 14 days. The patient denies signs and symptoms of decompensated cardiopulmonary disease including new dyspnea even with exertion, wheeze, cough, exertional chest pain, PND, orthopnea, claudication, TIA or stroke. He is not a smoker. He denies any drug use. he drinks alcohol occasionally.  Patient had umbilical hernia, SBO, right knee, shoulder, and lumbar fusion surgery in the past and he tolerated anesthesia well. He denies any bleeding history and reactions to anesthesia in his family that he is aware of.    Patient had multiple bouts of SBOs due to umbilical hernia repair 15 years ago with recent admission with surgical repair and bowel resection on 12/4/2023. He is following surgeon, Dr. Lord. Follow ups have been uneventful with no complications reported. Patient is having daily BMs with last one being this morning.  [FreeTextEntry7] : EKG with left fascicular block and calcification of coronary arteries on CT scan from 12/2023

## 2024-02-06 NOTE — DISCUSSION/SUMMARY
[FreeTextEntry1] : Pt is a 62 y/o M PMH HLD, BMI 37. He is scheduled for a shoulder replacement 02/07/2024 [facility] with Dr Chaudhary. He is feeling well overall - denies CP, SOB, diaphoresis, palpitations, dizziness, syncope, LE edema, PND, orthopnea.  Pt remains active - can walk 2-3 miles, can climb 2-3 flights of stairs  At this time the pt has no signs or symptoms of active ischemia, heart failure, life-threatening arrhythmias, severe valvular pathology. VSS There are no cardiac contraindications for planned procedure.   HLD: he is going to restart statin after surgery Advised lifestyle modifications   Pt will return in 6 mnths or sooner as needed but I encouraged communication via phone or portal if necessary.  Most recent available lab results were reviewed with pt. The described plan was discussed with the pt.  All questions and concerns were addressed to the best of my knowledge.  [EKG obtained to assist in diagnosis and management of assessed problem(s)] : EKG obtained to assist in diagnosis and management of assessed problem(s)

## 2024-02-06 NOTE — PHYSICAL EXAM
[Normal] : normal gait, coordination grossly intact, no focal deficits and deep tendon reflexes were 2+ and symmetric [de-identified] : obese abdomen [de-identified] : left shoulder restriction of rangeof motion in all directions, pain on movement

## 2024-02-06 NOTE — ASSESSMENT
[Cardiology consultation] : Cardiology consultation [Patient Optimized for Surgery] : Patient optimized for surgery [FreeTextEntry4] : cbc, cmp, coags, a1c, ua normal, EKG with left fascicular block and incidental finding of coronary calcification on CT scan form 12/2023.  Given EKG and coronary calcifications on imaging and patient 62yo - saw cardiology on 2/6/2024. As per note from cardio - There are no cardiac contraindications for planned procedure. For low-risk surgery with MARY ANN, can proceed with surgery without further evaluation, with heightened perioperative precautions - care in the PACU in a monitored bed with continues post-op pulse ox. Patient needs further cardiac workup for surgical optimization.

## 2024-02-06 NOTE — RESULTS/DATA
[] : results reviewed [de-identified] : elena [de-identified] : normal [de-identified] : normal [de-identified] : 12/2023 - clear lungs [de-identified] : left fascicular block

## 2024-02-06 NOTE — HISTORY OF PRESENT ILLNESS
[FreeTextEntry1] : Pt is a 64 y/o M who is referred here today by their PCP for evaluation.  Pt has PMH HLD, BMI 37. He is scheduled for a shoulder replacement 02/07/2024 [facility] with Dr Chaudhary. He is feeling well overall - denies CP, SOB, diaphoresis, palpitations, dizziness, syncope, LE edema, PND, orthopnea.  Pt remains active - can walk 2-3 miles, can climb 2-3 flights of stairs   Family hx: no cardiac disease Smoking status: never social ETOH no drug use Daily water intake: "a lot" Daily caffeine intake: 1 cup coffee OTC medications: none Previous cardiac testing: stress test many yrs ago "normal", cardiac angiogram 2015 [place] Previous hospitalizations/surgeries: umbilical hernia repair, SBO, colon resection, R shoulder, spinal surgery, R knee surgery - no problems with anesthesia

## 2024-02-06 NOTE — PLAN
[FreeTextEntry1] : 63M PMH multiple SBOs secondary to adhesions after umbilical hernia repair 15 years ago, asthma, Thomson's esophagus, MARY ANN, BPH for preop for left shoulder replacement due to OA on 2/7/2024 at [facility] with Dr. Chaudhary.   # Preop - With regards to functional capacity, patient indicates at baseline can mount >4 METS based on his ability to climb several flights of stairs without pausing at the top, carrying a load, without chest pain or presyncope. - He denies exertional chest pain, PND orthopnea history of TIA or stroke or history of claudication. He denies cough, wheeze, COPD.  - He does have a history asthma but denies any exacerbations and uses Advair daily, well controlled and follows a pulmonologist - 12/3/2023 CXR with clear lungs - He denies fever, shaking, chills, easy bruising. - He denies any bleeding history in himself and his family and denies any reactions to anesthesia in his family. - reports +sleep study and now consistently wears a CPAP - EKG with left anterior fascicular block and calcifications of the coronary vessels visualized on CT chest/abdomen in 12/2023  - cbc, cmp, a1c, coags, UA unremarkable - given EKG and coronary calcifications on imaging and patient 64yo  - For low-risk surgery with MARY ANN, can proceed with surgery without further evaluation, with heightened perioperative precautions - care in the PACU in a monitored bed with continuous post-op pulse ox. - patient saw cardiology on 2/6/2024 - as per cardio note - there are no cardiac contraindications for planned procedure.  #  multiple SBOs secondary to adhesions after umbilical hernia repair - multiple bouts of SBOs due to umbilical hernia repair 15 years ago - recent admission with surgical repair and bowel resection on 12/4/023 - reports daily BM, with last BM this morning - following surgeon, Dr. Lord - note on 1/16/2024 indicating good follow up and no complications. Aware of shoulder surgery and in agreement to go forward with proper cardiological consultation as well  # asthma - due to being a  during 9/11  - well-controlled, uses inhaler daily - follows pulmonology  # MARY ANN - reports +sleep study and now consistently wears a CPAP - reports improvement of his symptoms since weight loss  # Thomson's esophagus - follows GI with serial endoscopies - last one from 2 years ago negative - patient will follow again soon  # melanoma - SCC and basal cell carcinoma - removed and in remision, as per patient  # BPH - enlarged prostate noted on CT scan from 12/2023 - patient notes waking up once a night to urinated with mildly weak stream but no drippling, incontinence, or hesitance.  - followiing with urology tomorrow

## 2024-02-06 NOTE — REVIEW OF SYSTEMS
[Joint Pain] : joint pain [Joint Stiffness] : joint stiffness [Muscle Pain] : muscle pain [Negative] : Neurological [Muscle Weakness] : no muscle weakness [Back Pain] : no back pain [Joint Swelling] : no joint swelling [FreeTextEntry9] : left shoulder pain

## 2024-02-07 ENCOUNTER — OUTPATIENT (OUTPATIENT)
Dept: INPATIENT UNIT | Facility: HOSPITAL | Age: 64
LOS: 1 days | Discharge: HOME | End: 2024-02-07
Payer: MEDICARE

## 2024-02-07 ENCOUNTER — RESULT REVIEW (OUTPATIENT)
Age: 64
End: 2024-02-07

## 2024-02-07 ENCOUNTER — APPOINTMENT (OUTPATIENT)
Dept: ORTHOPEDIC SURGERY | Facility: HOSPITAL | Age: 64
End: 2024-02-07

## 2024-02-07 ENCOUNTER — TRANSCRIPTION ENCOUNTER (OUTPATIENT)
Age: 64
End: 2024-02-07

## 2024-02-07 VITALS
OXYGEN SATURATION: 97 % | DIASTOLIC BLOOD PRESSURE: 93 MMHG | WEIGHT: 235.89 LBS | RESPIRATION RATE: 12 BRPM | TEMPERATURE: 97 F | HEART RATE: 61 BPM | HEIGHT: 69 IN | SYSTOLIC BLOOD PRESSURE: 139 MMHG

## 2024-02-07 DIAGNOSIS — Z98.890 OTHER SPECIFIED POSTPROCEDURAL STATES: Chronic | ICD-10-CM

## 2024-02-07 DIAGNOSIS — Z98.1 ARTHRODESIS STATUS: Chronic | ICD-10-CM

## 2024-02-07 DIAGNOSIS — Z90.49 ACQUIRED ABSENCE OF OTHER SPECIFIED PARTS OF DIGESTIVE TRACT: Chronic | ICD-10-CM

## 2024-02-07 DIAGNOSIS — Z96.651 PRESENCE OF RIGHT ARTIFICIAL KNEE JOINT: Chronic | ICD-10-CM

## 2024-02-07 DIAGNOSIS — M19.012 PRIMARY OSTEOARTHRITIS, LEFT SHOULDER: ICD-10-CM

## 2024-02-07 DIAGNOSIS — Z96.611 PRESENCE OF RIGHT ARTIFICIAL SHOULDER JOINT: Chronic | ICD-10-CM

## 2024-02-07 DIAGNOSIS — Z98.89 OTHER SPECIFIED POSTPROCEDURAL STATES: Chronic | ICD-10-CM

## 2024-02-07 LAB
BUN SERPL-MCNC: 12 MG/DL — SIGNIFICANT CHANGE UP (ref 7–23)
CALCIUM SERPL-MCNC: 8.8 MG/DL — SIGNIFICANT CHANGE UP (ref 8.5–10.1)
CHLORIDE SERPL-SCNC: 105 MMOL/L — SIGNIFICANT CHANGE UP (ref 96–108)
CO2 SERPL-SCNC: 30 MMOL/L — SIGNIFICANT CHANGE UP (ref 22–31)
CREAT SERPL-MCNC: 1.02 MG/DL — SIGNIFICANT CHANGE UP (ref 0.5–1.3)
EGFR: 82 ML/MIN/1.73M2 — SIGNIFICANT CHANGE UP
GLUCOSE BLDC GLUCOMTR-MCNC: 123 MG/DL — HIGH (ref 70–99)
GLUCOSE BLDC GLUCOMTR-MCNC: 206 MG/DL — HIGH (ref 70–99)
GLUCOSE BLDC GLUCOMTR-MCNC: 96 MG/DL — SIGNIFICANT CHANGE UP (ref 70–99)
GLUCOSE SERPL-MCNC: 150 MG/DL — HIGH (ref 70–99)
HCT VFR BLD CALC: 40.2 % — SIGNIFICANT CHANGE UP (ref 39–50)
HGB BLD-MCNC: 13.4 G/DL — SIGNIFICANT CHANGE UP (ref 13–17)
MCHC RBC-ENTMCNC: 31.9 PG — SIGNIFICANT CHANGE UP (ref 27–34)
MCHC RBC-ENTMCNC: 33.3 GM/DL — SIGNIFICANT CHANGE UP (ref 32–36)
MCV RBC AUTO: 95.7 FL — SIGNIFICANT CHANGE UP (ref 80–100)
PLATELET # BLD AUTO: 169 K/UL — SIGNIFICANT CHANGE UP (ref 150–400)
POTASSIUM SERPL-MCNC: 4.2 MMOL/L — SIGNIFICANT CHANGE UP (ref 3.5–5.3)
POTASSIUM SERPL-SCNC: 4.2 MMOL/L — SIGNIFICANT CHANGE UP (ref 3.5–5.3)
RBC # BLD: 4.2 M/UL — SIGNIFICANT CHANGE UP (ref 4.2–5.8)
RBC # FLD: 12.9 % — SIGNIFICANT CHANGE UP (ref 10.3–14.5)
SODIUM SERPL-SCNC: 135 MMOL/L — SIGNIFICANT CHANGE UP (ref 135–145)
WBC # BLD: 7.62 K/UL — SIGNIFICANT CHANGE UP (ref 3.8–10.5)
WBC # FLD AUTO: 7.62 K/UL — SIGNIFICANT CHANGE UP (ref 3.8–10.5)

## 2024-02-07 PROCEDURE — 36415 COLL VENOUS BLD VENIPUNCTURE: CPT

## 2024-02-07 PROCEDURE — 88300 SURGICAL PATH GROSS: CPT | Mod: 26

## 2024-02-07 PROCEDURE — 80048 BASIC METABOLIC PNL TOTAL CA: CPT

## 2024-02-07 PROCEDURE — 97530 THERAPEUTIC ACTIVITIES: CPT | Mod: GO

## 2024-02-07 PROCEDURE — 73030 X-RAY EXAM OF SHOULDER: CPT | Mod: LT

## 2024-02-07 PROCEDURE — C1713: CPT

## 2024-02-07 PROCEDURE — 94640 AIRWAY INHALATION TREATMENT: CPT

## 2024-02-07 PROCEDURE — C1776: CPT

## 2024-02-07 PROCEDURE — 97166 OT EVAL MOD COMPLEX 45 MIN: CPT | Mod: GO

## 2024-02-07 PROCEDURE — 99215 OFFICE O/P EST HI 40 MIN: CPT

## 2024-02-07 PROCEDURE — 88300 SURGICAL PATH GROSS: CPT

## 2024-02-07 PROCEDURE — 73030 X-RAY EXAM OF SHOULDER: CPT | Mod: 26,LT

## 2024-02-07 PROCEDURE — 85027 COMPLETE CBC AUTOMATED: CPT

## 2024-02-07 PROCEDURE — 97535 SELF CARE MNGMENT TRAINING: CPT | Mod: GO

## 2024-02-07 PROCEDURE — 23472 RECONSTRUCT SHOULDER JOINT: CPT | Mod: LT

## 2024-02-07 PROCEDURE — 82962 GLUCOSE BLOOD TEST: CPT

## 2024-02-07 RX ORDER — FLUTICASONE PROPIONATE AND SALMETEROL 50; 250 UG/1; UG/1
1 POWDER ORAL; RESPIRATORY (INHALATION)
Qty: 0 | Refills: 0 | DISCHARGE

## 2024-02-07 RX ORDER — CEFAZOLIN SODIUM 1 G
2000 VIAL (EA) INJECTION EVERY 8 HOURS
Refills: 0 | Status: COMPLETED | OUTPATIENT
Start: 2024-02-07 | End: 2024-02-07

## 2024-02-07 RX ORDER — ONDANSETRON 8 MG/1
1 TABLET, FILM COATED ORAL
Qty: 15 | Refills: 0
Start: 2024-02-07 | End: 2024-02-11

## 2024-02-07 RX ORDER — HYDROMORPHONE HYDROCHLORIDE 2 MG/ML
0.5 INJECTION INTRAMUSCULAR; INTRAVENOUS; SUBCUTANEOUS
Refills: 0 | Status: DISCONTINUED | OUTPATIENT
Start: 2024-02-07 | End: 2024-02-07

## 2024-02-07 RX ORDER — BUDESONIDE AND FORMOTEROL FUMARATE DIHYDRATE 160; 4.5 UG/1; UG/1
2 AEROSOL RESPIRATORY (INHALATION)
Refills: 0 | Status: DISCONTINUED | OUTPATIENT
Start: 2024-02-07 | End: 2024-02-09

## 2024-02-07 RX ORDER — SODIUM CHLORIDE 9 MG/ML
1000 INJECTION, SOLUTION INTRAVENOUS
Refills: 0 | Status: DISCONTINUED | OUTPATIENT
Start: 2024-02-07 | End: 2024-02-07

## 2024-02-07 RX ORDER — FENTANYL CITRATE 50 UG/ML
50 INJECTION INTRAVENOUS
Refills: 0 | Status: DISCONTINUED | OUTPATIENT
Start: 2024-02-07 | End: 2024-02-07

## 2024-02-07 RX ORDER — ALBUTEROL 90 UG/1
2 AEROSOL, METERED ORAL EVERY 6 HOURS
Refills: 0 | Status: DISCONTINUED | OUTPATIENT
Start: 2024-02-07 | End: 2024-02-09

## 2024-02-07 RX ORDER — ACETAMINOPHEN 500 MG
2 TABLET ORAL
Qty: 84 | Refills: 0
Start: 2024-02-07 | End: 2024-02-20

## 2024-02-07 RX ORDER — POLYETHYLENE GLYCOL 3350 17 G/17G
17 POWDER, FOR SOLUTION ORAL
Qty: 1 | Refills: 0
Start: 2024-02-07 | End: 2024-02-20

## 2024-02-07 RX ORDER — ALBUTEROL 90 UG/1
2 AEROSOL, METERED ORAL
Refills: 0 | DISCHARGE

## 2024-02-07 RX ORDER — ONDANSETRON 8 MG/1
4 TABLET, FILM COATED ORAL EVERY 6 HOURS
Refills: 0 | Status: DISCONTINUED | OUTPATIENT
Start: 2024-02-07 | End: 2024-02-07

## 2024-02-07 RX ORDER — ASPIRIN/CALCIUM CARB/MAGNESIUM 324 MG
1 TABLET ORAL
Qty: 14 | Refills: 0
Start: 2024-02-07 | End: 2024-02-20

## 2024-02-07 RX ORDER — SENNA PLUS 8.6 MG/1
2 TABLET ORAL
Qty: 28 | Refills: 0
Start: 2024-02-07 | End: 2024-02-20

## 2024-02-07 RX ORDER — OXYCODONE HYDROCHLORIDE 5 MG/1
1 TABLET ORAL
Qty: 30 | Refills: 0
Start: 2024-02-07 | End: 2024-02-11

## 2024-02-07 RX ORDER — PANTOPRAZOLE SODIUM 20 MG/1
40 TABLET, DELAYED RELEASE ORAL ONCE
Refills: 0 | Status: COMPLETED | OUTPATIENT
Start: 2024-02-07 | End: 2024-02-07

## 2024-02-07 RX ORDER — CEFAZOLIN SODIUM 1 G
2000 VIAL (EA) INJECTION EVERY 8 HOURS
Refills: 0 | Status: DISCONTINUED | OUTPATIENT
Start: 2024-02-07 | End: 2024-02-07

## 2024-02-07 RX ORDER — CELECOXIB 200 MG/1
1 CAPSULE ORAL
Qty: 60 | Refills: 0
Start: 2024-02-07 | End: 2024-03-07

## 2024-02-07 RX ORDER — OMEPRAZOLE 10 MG/1
1 CAPSULE, DELAYED RELEASE ORAL
Refills: 0 | DISCHARGE

## 2024-02-07 RX ADMIN — BUDESONIDE AND FORMOTEROL FUMARATE DIHYDRATE 2 PUFF(S): 160; 4.5 AEROSOL RESPIRATORY (INHALATION) at 20:58

## 2024-02-07 RX ADMIN — Medication 1000 MILLIGRAM(S): at 16:27

## 2024-02-07 RX ADMIN — Medication 1000 MILLIGRAM(S): at 21:24

## 2024-02-07 RX ADMIN — PANTOPRAZOLE SODIUM 40 MILLIGRAM(S): 20 TABLET, DELAYED RELEASE ORAL at 07:02

## 2024-02-07 RX ADMIN — CELECOXIB 200 MILLIGRAM(S): 200 CAPSULE ORAL at 21:24

## 2024-02-07 RX ADMIN — Medication 2000 MILLIGRAM(S): at 16:16

## 2024-02-07 RX ADMIN — Medication 2000 MILLIGRAM(S): at 23:35

## 2024-02-07 RX ADMIN — OXYCODONE HYDROCHLORIDE 10 MILLIGRAM(S): 5 TABLET ORAL at 23:40

## 2024-02-07 NOTE — OCCUPATIONAL THERAPY INITIAL EVALUATION ADULT - LEVEL OF INDEPENDENCE: DRESS UPPER BODY, OT EVAL
educated on shoulder immobilizer management, clothing recommendations, and sequencing/moderate assist (50% patients effort)

## 2024-02-07 NOTE — OCCUPATIONAL THERAPY INITIAL EVALUATION ADULT - PERTINENT HX OF CURRENT PROBLEM, REHAB EVAL
Per PST H&P- pt is a 64 y/o male with OA left shoulder c/o pain and limited ROM; denies numbness and tingling. Pt is now s/p L TSR.

## 2024-02-07 NOTE — DISCHARGE NOTE PROVIDER - NSDCMRMEDTOKEN_GEN_ALL_CORE_FT
Advair Diskus 250 mcg-50 mcg inhalation powder: 1 puff(s) inhaled 2 times a day  Albuterol (Eqv-ProAir HFA) 90 mcg/inh inhalation aerosol: 2 puff(s) inhaled prn  Aspirin EC 81 mg oral delayed release tablet: 1 tab(s) orally once a day MDD: 1  CeleBREX 200 mg oral capsule: 1 cap(s) orally 2 times a day  MiraLax oral powder for reconstitution: 17 gram(s) orally once a day as needed for  constipation  omeprazole 40 mg oral delayed release capsule: 1 cap(s) orally  ondansetron 4 mg oral tablet: 1 tab(s) orally every 8 hours as needed for  nausea  oxyCODONE 5 mg oral tablet: 1 tab(s) orally every 4 hours as needed for  severe pain MDD: 6  Senna 8.6 mg oral tablet: 2 tab(s) orally once a day (at bedtime) as needed for  constipation  Tylenol Extra Strength 500 mg oral tablet: 2 tab(s) orally every 8 hours

## 2024-02-07 NOTE — DISCHARGE NOTE PROVIDER - CARE PROVIDER_API CALL
Nilton Chaudhary  Orthopaedic Surgery  [address], [address]  [place] [address]  Phone: [phone]  Fax: [phone]  Follow Up Time:

## 2024-02-07 NOTE — DISCHARGE NOTE PROVIDER - HOSPITAL COURSE
H&P:  Pt is a 63y Male    PAST MEDICAL & SURGICAL HISTORY:  Hyperlipidemia      Sleep apnea  CPAP      Asthma  [facility] responder--followed by [facility] monitoring center      History of Thomson's esophagus      GERD (gastroesophageal reflux disease)      Arthritis      H/O squamous cell carcinoma excision  left hand      H/O melanoma excision  Left thigh      H/O small bowel obstruction      Chronic sinusitis      BPH (benign prostatic hyperplasia)      Obese      Hearing loss      S/P lumbar fusion  L3-L4  ---2015      S/P inguinal hernia repair  right--1988      H/O umbilical hernia repair  2005      S/P total knee replacement, right  2018      H/O cardiac catheterization  7-8 yr ago---normal      H/O resection of small bowel      H/O total shoulder replacement, right           Now s/p Left Reverse Total Shoulder Arthroplasty. Pt is afebrile with stable vital signs. Pain is controlled. Alert and Oriented. Exam reveals intact AIN/PIN, wrist flexion and wrist extension, 2+Radial Pulse. Dressing is clean and dry.    Hospital Course:  Patient presented to [facility] medically cleared for Left Total Shoulder Arthroplasty Replacement Surgery, having failed outpatient non-operative conservative management. Prophylactic antibiotics were started before the procedure and continued for 24 hours. They were admitted after surgery to the orthopedic floor.  There were no complications during the hospital stay.     Routine consult was obtained from Physical Therapy and Occupational Therapy for gait training and pendulums. Consult to Hospitalist for Medical Co-management. Patient was placed on anticoagulation post-operatively while inhouse.  Pertinent home medications were continued.  Daily labs were followed.      On POD 0 there were no overnight events and the pt was OOB with PT. POD 1, PT was continued, and anticipate POD 1 DC to home. The orthopedic Attending is aware and agrees.

## 2024-02-07 NOTE — DISCHARGE NOTE PROVIDER - NSDCFUADDINST_GEN_ALL_CORE_FT
Discharge Instructions for Left Total Shoulder Arthroplasty:    PRESCRIPTIONS:   Pain- An eRx will be sent electronically to your pharmacy for  before DC or the day of.  Oxycodone is prescribed for severe pain. Wean off the Oxycodone as soon as your can. Additionally you can take Extra Strength Tylenol with the Oxycodone. A prescription for Celebrex 200mg PO twice daily for 30 days was sent to you pharmacy electronically to assist with pain control at home.     Stool Softener- We also recommend Miralax  stool softener which is over the counter to take daily while you are on Oxycodone.    Blood Clot Prevention- We have sent an electronic prescription to your pharmacy for Aspirin 81mg PO daily for 2 weeks to help prevent blood clot post-operatively.    If you need any refills on your medications, please call Dr. Chaudhary’s office when you have a 3-day supply left. Some of the medications (narcotics) cannot be called into a pharmacy and the prescription will need to be picked up at the office or mailed to you. If you were taking other medications for blood pressure, heart problems etc., you should discuss this with your family physician.     BANDAGE/INCISION CARE : A two layer water-resistant dressing is applied during surgery. You can remove the outer layer 3 days after surgery (Post-op Day 3) or sooner if it gets wet. The inner layer will stay on until your first post-operative appointment. The inner dressing is waterproof so you may shower immediately but avoid soaking in bathtub or swimming pool.     Once your dressing is removed, you may leave your incision open to air or apply a dry gauze dressing. The sutures are dissolvable and Dermabond (surgical glue) is applied. Skin glue will gradually come off as incision heals. If you notice redness, swelling or drainage around incision, contact Dr. Chaudhary’s office immediately.      SLING: For the first two to six weeks after your surgery, you will be wearing your sling the majority of time, coming out of it to complete exercises given to you by the therapists and Dr. Chaudhary upon your discharge from the hospital. After your first post-operative visit, Dr. Chaudhary may suggest you come out of your sling during the day to do activities in front of you.  Wearing the sling alerts others around you to be cautious and to avoid accidentally striking your arm. Also, during this time do not use your arm to pull or push yourself out of bed or out of a chair.     ACTIVITY: NO shoulder ROM. Bend and straighten elbow a few times daily so it doesn't get stiff. Walk Plenty. No sitting around. NO lifting with operative arm. See Detailed instructions in your packet.    ICE: Ice plenty and often during the first 2 weeks. Protect skin from direct ice using a towel or pillow case barrier.    PHYSICAL THERAPY: Dr. Chaudhary prefers that his patients do NOT do formal Physical Therapy after Total Shoulder Replacement. He has found that most patients do well with progressing their motion and strength through normal daily activities. He has also found that sometimes physical therapy pushes patients too much causing increased pain and discomfort. At each post-operative appointment, Dr. Chaudhary will teach you exercises to do own to work on range of motion. Although you should progress with some gentle range of motion, as demonstrated by Dr. Chaudhary, do not force any shoulder motions. Most importantly, do not let anyone (family members, physical therapists, etc) force your arm into uncomfortable positions.      DRIVING: You are NOT permitted to drive a car while taking narcotic medication or when you are wearing a sling. Do not drive until after your first follow-up visit with your surgeon.     RETURNING TO WORK: Returning to work depends on the demands of your job and should be discussed with Dr. Chaudhary before the surgery.     FOLLOW UP: Dr. Chaudhary or his PA will need to see you for multiple appointments after your surgery. Typically, Dr. Chaudhary orhis PA will see you back after 2 weeks, 6 weeks, 3 months and 6 months. You will be seen at 1 year, 2 years, 5 years and 10 year intervals thereafter.     **Please refer to patient post op info packet given to you at office visit for further details.

## 2024-02-07 NOTE — DISCHARGE NOTE PROVIDER - NSDCFUSCHEDAPPT_GEN_ALL_CORE_FT
Nilton Chaudhary [facility]  ORTHOR [address]  Scheduled Appointment: 02/15/2024    Nilton Chaudhary  [facility] [address]  Scheduled Appointment: 03/12/2024

## 2024-02-07 NOTE — OCCUPATIONAL THERAPY INITIAL EVALUATION ADULT - NSOTDISCHREC_GEN_A_CORE
home with family assist for ADL/IADL tasks, and future outpatient OT vs PT for shoulder once cleared by MD

## 2024-02-07 NOTE — CONSULT NOTE ADULT - SUBJECTIVE AND OBJECTIVE BOX
PCP: Dr. Sher Louise     CHIEF COMPLAINT: left shoulder pain     HISTORY OF THE PRESENT ILLNESS: this is a 64 yo with PMH of  GERD, barretts esophagus, asthma, HLD, OA of multiple joints and multiple joint replacements. he is presenting with progressive pain with movement, limited ROM and INC pain at night, who failed the usual modalities for pain and is now s/p left shoulder arthoplasty.   Pt is seen in 2 north in no distress, nerve block still in effect. denies any CP or SOB.  We are consulted for medical management    PAST MEDICAL & SURGICAL HISTORY:  Hyperlipidemia      Sleep apnea  CPAP      Asthma  WTC responder--followed by [facility] monitoring center      History of Thomson's esophagus      GERD (gastroesophageal reflux disease)      Arthritis      H/O squamous cell carcinoma excision  left hand      H/O melanoma excision  Left thigh      H/O small bowel obstruction      Chronic sinusitis      BPH (benign prostatic hyperplasia)      Obese      Hearing loss      S/P lumbar fusion  L3-L4  ---2015    S/P inguinal hernia repair  right--1988    H/O umbilical hernia repair  2005    S/P total knee replacement, right  2018    H/O cardiac catheterization  7-8 yr ago---normal    H/O resection of small bowel 2023     H/O total shoulder replacement, right          FAMILY HISTORY:   non-contributory to the patient's current presentation    SOCIAL HISTORY:  no smoking, no alcohol, no drugs    ALLERGIES: no drug alleriges     HOME MEDS:  Home Medications:  Advair Diskus 250 mcg-50 mcg inhalation powder: 1 puff(s) inhaled 2 times a day (07 Feb 2024 06:26)  Albuterol (Eqv-ProAir HFA) 90 mcg/inh inhalation aerosol: 2 puff(s) inhaled prn (07 Feb 2024 06:26)  omeprazole 40 mg oral delayed release capsule: 1 cap(s) orally (07 Feb 2024 06:26)      REVIEW OF SYSTEMS:   All 10 systems reviewed in detailed and found to be negative with the exception of what has already been described above    Vital Signs Last 24 Hrs  T(C): 36.8 (07 Feb 2024 11:18), Max: 36.8 (07 Feb 2024 11:18)  T(F): 98.2 (07 Feb 2024 11:18), Max: 98.2 (07 Feb 2024 11:18)  HR: 96 (07 Feb 2024 11:18) (61 - 96)  BP: 122/80 (07 Feb 2024 11:18) (122/80 - 169/87)  BP(mean): --  RR: 16 (07 Feb 2024 11:18) (12 - 21)  SpO2: 92% (07 Feb 2024 11:18) (92% - 99%)    Parameters below as of 07 Feb 2024 11:18  Patient On (Oxygen Delivery Method): room air        MEDICATIONS  (STANDING):  acetaminophen     Tablet .. 1000 milliGRAM(s) Oral every 8 hours  budesonide 160 MICROgram(s)/formoterol 4.5 MICROgram(s) Inhaler 2 Puff(s) Inhalation two times a day  ceFAZolin  Injectable. 2000 milliGRAM(s) IV Push every 8 hours  celecoxib 200 milliGRAM(s) Oral every 12 hours  lactated ringers. 1000 milliLiter(s) (75 mL/Hr) IV Continuous <Continuous>  pantoprazole    Tablet 40 milliGRAM(s) Oral before breakfast  polyethylene glycol 3350 17 Gram(s) Oral at bedtime  senna 2 Tablet(s) Oral at bedtime  tranexamic acid IVPB 1000 milliGRAM(s) IV Intermittent once  tranexamic acid IVPB 1000 milliGRAM(s) IV Intermittent once    MEDICATIONS  (PRN):  albuterol    90 MICROgram(s) HFA Inhaler 2 Puff(s) Inhalation every 6 hours PRN Shortness of Breath and/or Wheezing  HYDROmorphone  Injectable 0.5 milliGRAM(s) SubCutaneous every 4 hours PRN Severe Pain (7 - 10)  ondansetron Injectable 8 milliGRAM(s) IV Push every 8 hours PRN Nausea and/or Vomiting  oxyCODONE    IR 5 milliGRAM(s) Oral every 4 hours PRN Mild Pain (1 - 3)  oxyCODONE    IR 10 milliGRAM(s) Oral every 4 hours PRN Moderate Pain (4 - 6)  prochlorperazine   Injectable 10 milliGRAM(s) IV Push every 8 hours PRN Nausea and/or Vomiting      HEENT:  pupils equal and reactive, EOMI, no oropharyngeal lesions, erythema, exudates, oral thrush  NECK:   supple, no carotid bruits, no palpable lymph nodes, no thyromegaly  CV:  +S1, +S2, regular, no murmurs or rubs  RESP:   lungs clear to auscultation bilaterally, no wheezing, rales, rhonchi, good air entry bilaterally  GI:  abdomen soft, non-tender, non-distended, normal BS, no bruits, no abdominal masses, no palpable masses  EXT:  left shoulder post op site clean and dry, no bleeding or hematoma to area. no clubbing, no cyanosis, no edema, no calf pain, swelling or erythema  NEURO:  AAOX3, no focal neurological deficits, follows all commands, able to move extremities spontaneously  SKIN:  no ulcers, lesions or rashes    LABS:                          13.4   7.62  )-----------( 169      ( 07 Feb 2024 10:50 )             40.2     02-07    135  |  105  |  12  ----------------------------<  150<H>  4.2   |  30  |  1.02    Ca    8.8      07 Feb 2024 10:50          IMPRESSION: 64 yo with above pmh a/w:  # OA of left shoulder   # S/P left shoulder arthoplasty     POD#0  pain control  PT eval  Bowel regimen  IVF's  Finish ABXs  regular diet  PPI  VTE prophylaxis  monitor CBC/BMP    # asthma   therapeutic interchange for advair 250/50     # Vte prophylaxis  caprini 8 (prior squamous cell CA, 45 mins surgery, prior small bowel resection Dec 2023, BMI > 25, 64 y/o male)   [employer]  INC mobility      Thank you for the consult, will follow         PCP: Dr. Sher Louise     CHIEF COMPLAINT: left shoulder pain     HISTORY OF THE PRESENT ILLNESS: this is a 62 yo with PMH of  GERD, barretts esophagus, asthma, HLD, OA of multiple joints and multiple joint replacements. he is presenting with progressive pain with movement, limited ROM and INC pain at night, who failed the usual modalities for pain and is now s/p left shoulder arthoplasty.   Pt is seen in 2 north in no distress, nerve block still in effect. denies any CP or SOB.  We are consulted for medical management    PAST MEDICAL & SURGICAL HISTORY:  Hyperlipidemia      Sleep apnea  CPAP      Asthma  WTC responder--followed by [facility] monitoring center      History of Thomson's esophagus      GERD (gastroesophageal reflux disease)      Arthritis      H/O squamous cell carcinoma excision  left hand      H/O melanoma excision  Left thigh      H/O small bowel obstruction      Chronic sinusitis      BPH (benign prostatic hyperplasia)      Obese      Hearing loss      S/P lumbar fusion  L3-L4  ---2015    S/P inguinal hernia repair  right--1988    H/O umbilical hernia repair  2005    S/P total knee replacement, right  2018    H/O cardiac catheterization  7-8 yr ago---normal    H/O resection of small bowel 2023     H/O total shoulder replacement, right          FAMILY HISTORY:   non-contributory to the patient's current presentation    SOCIAL HISTORY:  no smoking, no alcohol, no drugs    ALLERGIES: no drug alleriges     HOME MEDS:  Home Medications:  Advair Diskus 250 mcg-50 mcg inhalation powder: 1 puff(s) inhaled 2 times a day (07 Feb 2024 06:26)  Albuterol (Eqv-ProAir HFA) 90 mcg/inh inhalation aerosol: 2 puff(s) inhaled prn (07 Feb 2024 06:26)  omeprazole 40 mg oral delayed release capsule: 1 cap(s) orally (07 Feb 2024 06:26)      REVIEW OF SYSTEMS:   All 10 systems reviewed in detailed and found to be negative with the exception of what has already been described above    Vital Signs Last 24 Hrs  T(C): 36.8 (07 Feb 2024 11:18), Max: 36.8 (07 Feb 2024 11:18)  T(F): 98.2 (07 Feb 2024 11:18), Max: 98.2 (07 Feb 2024 11:18)  HR: 96 (07 Feb 2024 11:18) (61 - 96)  BP: 122/80 (07 Feb 2024 11:18) (122/80 - 169/87)  BP(mean): --  RR: 16 (07 Feb 2024 11:18) (12 - 21)  SpO2: 92% (07 Feb 2024 11:18) (92% - 99%)    Parameters below as of 07 Feb 2024 11:18  Patient On (Oxygen Delivery Method): room air        MEDICATIONS  (STANDING):  acetaminophen     Tablet .. 1000 milliGRAM(s) Oral every 8 hours  budesonide 160 MICROgram(s)/formoterol 4.5 MICROgram(s) Inhaler 2 Puff(s) Inhalation two times a day  ceFAZolin  Injectable. 2000 milliGRAM(s) IV Push every 8 hours  celecoxib 200 milliGRAM(s) Oral every 12 hours  lactated ringers. 1000 milliLiter(s) (75 mL/Hr) IV Continuous <Continuous>  pantoprazole    Tablet 40 milliGRAM(s) Oral before breakfast  polyethylene glycol 3350 17 Gram(s) Oral at bedtime  senna 2 Tablet(s) Oral at bedtime  tranexamic acid IVPB 1000 milliGRAM(s) IV Intermittent once  tranexamic acid IVPB 1000 milliGRAM(s) IV Intermittent once    MEDICATIONS  (PRN):  albuterol    90 MICROgram(s) HFA Inhaler 2 Puff(s) Inhalation every 6 hours PRN Shortness of Breath and/or Wheezing  HYDROmorphone  Injectable 0.5 milliGRAM(s) SubCutaneous every 4 hours PRN Severe Pain (7 - 10)  ondansetron Injectable 8 milliGRAM(s) IV Push every 8 hours PRN Nausea and/or Vomiting  oxyCODONE    IR 5 milliGRAM(s) Oral every 4 hours PRN Mild Pain (1 - 3)  oxyCODONE    IR 10 milliGRAM(s) Oral every 4 hours PRN Moderate Pain (4 - 6)  prochlorperazine   Injectable 10 milliGRAM(s) IV Push every 8 hours PRN Nausea and/or Vomiting      HEENT:  pupils equal and reactive, EOMI, no oropharyngeal lesions, erythema, exudates, oral thrush  NECK:   supple, no carotid bruits, no palpable lymph nodes, no thyromegaly  CV:  +S1, +S2, regular, no murmurs or rubs  RESP:   lungs clear to auscultation bilaterally, no wheezing, rales, rhonchi, good air entry bilaterally  GI:  abdomen soft, non-tender, non-distended, normal BS, no bruits, no abdominal masses, no palpable masses  EXT:  left shoulder post op site clean and dry, no bleeding or hematoma to area. no clubbing, no cyanosis, no edema, no calf pain, swelling or erythema  NEURO:  AAOX3, no focal neurological deficits, follows all commands, able to move extremities spontaneously  SKIN:  no ulcers, lesions or rashes    LABS:                          13.4   7.62  )-----------( 169      ( 07 Feb 2024 10:50 )             40.2     02-07    135  |  105  |  12  ----------------------------<  150<H>  4.2   |  30  |  1.02    Ca    8.8      07 Feb 2024 10:50          IMPRESSION: 62 yo with above pmh a/w:  # OA of left shoulder   # S/P left shoulder arthoplasty     POD#0  pain control  PT eval  Bowel regimen  IVF's  Finish ABXs  regular diet  PPI  VTE prophylaxis  monitor CBC/BMP    # asthma   therapeutic interchange for advair 250/50     # Vte prophylaxis  caprini 8 (prior squamous cell CA, 45 mins surgery, prior small bowel resection Dec 2023, BMI > 25, 64 y/o male) consider asa 81 mg bid   Venodynes  INC mobility      Thank you for the consult, will follow  plan of care discussed with Dr. Roger

## 2024-02-07 NOTE — OCCUPATIONAL THERAPY INITIAL EVALUATION ADULT - COGNITIVE, VISUAL PERCEPTUAL, OT EVAL
Pt will adhere to/recall NWB precautions and ROM precautions for L shoulder with 100% accuracy in 1-2 tx sessions to enhance safety with ADL tasks.

## 2024-02-07 NOTE — DISCHARGE NOTE PROVIDER - CARE PROVIDERS DIRECT ADDRESSES
[email or web address]

## 2024-02-07 NOTE — OCCUPATIONAL THERAPY INITIAL EVALUATION ADULT - ADL RETRAINING, OT EVAL
Pt will perform UBD using compensatory techniques (including sling mgt) with CGA in 1-2 tx sessions. Pt will perform LBD with supervision assist in 1-2 tx sessions.

## 2024-02-07 NOTE — ASU PATIENT PROFILE, ADULT - FALL HARM RISK - UNIVERSAL INTERVENTIONS
Bed in lowest position, wheels locked, appropriate side rails in place/Call bell, personal items and telephone in reach/Instruct patient to call for assistance before getting out of bed or chair/Non-slip footwear when patient is out of bed/[place] to call system/Physically safe environment - no spills, clutter or unnecessary equipment/Purposeful Proactive Rounding/Room/bathroom lighting operational, light cord in reach

## 2024-02-07 NOTE — ASU PATIENT PROFILE, ADULT - NSICDXPASTMEDICALHX_GEN_ALL_CORE_FT
PAST MEDICAL HISTORY:  Arthritis     Asthma WTC responder--followed by [facility] monitoring center    GERD (gastroesophageal reflux disease)     H/O melanoma excision Left thigh    H/O squamous cell carcinoma excision left hand    History of Thomson's esophagus     Hyperlipidemia     Sleep apnea CPAP

## 2024-02-07 NOTE — PROGRESS NOTE ADULT - SUBJECTIVE AND OBJECTIVE BOX
Postop Check    Patient tolerated the procedure well. Patient seen and examined at bedside. No acute complaints at this time. Pain well controlled. Denies chest pain, shortness of breath, nausea or vomiting.     Vital Signs (24 Hrs):  T(C): 36.8 (02-07-24 @ 20:00), Max: 36.9 (02-07-24 @ 16:00)  HR: 81 (02-07-24 @ 20:00) (61 - 96)  BP: 106/65 (02-07-24 @ 20:00) (102/66 - 169/87)  RR: 18 (02-07-24 @ 20:00) (12 - 21)  SpO2: 95% (02-07-24 @ 20:00) (92% - 99%)  Wt(kg): --    LABS:                          13.4   7.62  )-----------( 169      ( 07 Feb 2024 10:50 )             40.2     02-07    135  |  105  |  12  ----------------------------<  150<H>  4.2   |  30  |  1.02    Ca    8.8      07 Feb 2024 10:50      General: NAD, resting comfortably in bed  LUE:   Dressing C/D/I  Compartments soft and compressible  Weakness of Ulnar and PIN Nerve function  +Axillary/Musculocutaneous/Radial/Median/AIN  SILT C5-T1  2+ radial pulses    No calf tenderness bilaterally      A/P:  63y m s/p L TSA POD0  -PT/OT   -NWB LUE, SI  -Pain Control  -DVT ppx starting POD1  -Continue perioperative abx x 24 hours  -FU AM Labs  -Rest, ice, compress and elevate the extremity as we needed  -Incentive Spirometry  -Medical management appreciated  -Will discuss plan with Dr. Chaudhary and will advise changes to plan as needed

## 2024-02-07 NOTE — OCCUPATIONAL THERAPY INITIAL EVALUATION ADULT - ADDITIONAL COMMENTS
Pt resides in a  with his wife with 3-4 BRIAN +HR, 12 steps to bedroom. Pt has a walk in shower, built in seat, no DME in bathroom, chair height toilet. Pt (I) with all ADL/IADL tasks, walked without AD. +, LHD. His wife is retired and can assist with all tasks.

## 2024-02-07 NOTE — OCCUPATIONAL THERAPY INITIAL EVALUATION ADULT - NSACTIVITYREC_GEN_A_OT
Pt presents with impaired balance, endurance and muscle strength that will benefit from skilled OT to improve independence in ADLs, reduce fall risk and chance for readmission. Pt and his wife educated on ROM and WBP, impact on ADL/IADL tasks, pendular exercises, and compensatory techniques. Pt with no PT needs- (I) with fxl transfers, walked without AD, normal balance, performed stairs (I) using HR, car transfer with supervision.

## 2024-02-07 NOTE — CONSULT NOTE ADULT - NS ATTEND AMEND GEN_ALL_CORE FT
64 yo with above pmh a/w:  # OA of left shoulder   # S/P left shoulder arthoplasty     POD#0  plan as per ortho  pain meds prn  PT eval  Bowel regimen  IVF's  Finish ABXs  regular diet  PPI  VTE prophylaxis  monitor CBC/BMP    # asthma   therapeutic interchange for advair 250/50     # Vte prophylaxis  caprini 8 (prior squamous cell CA, 45 mins surgery, prior small bowel resection Dec 2023, BMI > 25, 62 y/o male)   [employer]  INC mobility  asprin 325 mg daily       Thank you for the consult, will follow

## 2024-02-08 LAB
ANION GAP SERPL CALC-SCNC: 3 MMOL/L — LOW (ref 5–17)
BUN SERPL-MCNC: 13 MG/DL — SIGNIFICANT CHANGE UP (ref 7–23)
CALCIUM SERPL-MCNC: 8.4 MG/DL — LOW (ref 8.5–10.1)
CHLORIDE SERPL-SCNC: 107 MMOL/L — SIGNIFICANT CHANGE UP (ref 96–108)
CO2 SERPL-SCNC: 27 MMOL/L — SIGNIFICANT CHANGE UP (ref 22–31)
CREAT SERPL-MCNC: 0.7 MG/DL — SIGNIFICANT CHANGE UP (ref 0.5–1.3)
EGFR: 102 ML/MIN/1.73M2 — SIGNIFICANT CHANGE UP
GLUCOSE BLDC GLUCOMTR-MCNC: 116 MG/DL — HIGH (ref 70–99)
GLUCOSE SERPL-MCNC: 105 MG/DL — HIGH (ref 70–99)
HCT VFR BLD CALC: 31.7 % — LOW (ref 39–50)
HGB BLD-MCNC: 10.5 G/DL — LOW (ref 13–17)
MCHC RBC-ENTMCNC: 32 PG — SIGNIFICANT CHANGE UP (ref 27–34)
MCHC RBC-ENTMCNC: 33.1 GM/DL — SIGNIFICANT CHANGE UP (ref 32–36)
MCV RBC AUTO: 96.6 FL — SIGNIFICANT CHANGE UP (ref 80–100)
PLATELET # BLD AUTO: 162 K/UL — SIGNIFICANT CHANGE UP (ref 150–400)
POTASSIUM SERPL-MCNC: 3.6 MMOL/L — SIGNIFICANT CHANGE UP (ref 3.5–5.3)
POTASSIUM SERPL-SCNC: 3.6 MMOL/L — SIGNIFICANT CHANGE UP (ref 3.5–5.3)
RBC # BLD: 3.28 M/UL — LOW (ref 4.2–5.8)
RBC # FLD: 12.9 % — SIGNIFICANT CHANGE UP (ref 10.3–14.5)
SODIUM SERPL-SCNC: 137 MMOL/L — SIGNIFICANT CHANGE UP (ref 135–145)
WBC # BLD: 8.14 K/UL — SIGNIFICANT CHANGE UP (ref 3.8–10.5)
WBC # FLD AUTO: 8.14 K/UL — SIGNIFICANT CHANGE UP (ref 3.8–10.5)

## 2024-02-08 PROCEDURE — 99214 OFFICE O/P EST MOD 30 MIN: CPT

## 2024-02-08 RX ADMIN — Medication 1000 MILLIGRAM(S): at 21:27

## 2024-02-08 RX ADMIN — OXYCODONE HYDROCHLORIDE 10 MILLIGRAM(S): 5 TABLET ORAL at 10:46

## 2024-02-08 RX ADMIN — OXYCODONE HYDROCHLORIDE 10 MILLIGRAM(S): 5 TABLET ORAL at 00:10

## 2024-02-08 RX ADMIN — OXYCODONE HYDROCHLORIDE 10 MILLIGRAM(S): 5 TABLET ORAL at 13:55

## 2024-02-08 RX ADMIN — PANTOPRAZOLE SODIUM 40 MILLIGRAM(S): 20 TABLET, DELAYED RELEASE ORAL at 05:22

## 2024-02-08 RX ADMIN — Medication 1000 MILLIGRAM(S): at 13:55

## 2024-02-08 RX ADMIN — CELECOXIB 200 MILLIGRAM(S): 200 CAPSULE ORAL at 09:37

## 2024-02-08 RX ADMIN — HYDROMORPHONE HYDROCHLORIDE 0.5 MILLIGRAM(S): 2 INJECTION INTRAMUSCULAR; INTRAVENOUS; SUBCUTANEOUS at 12:36

## 2024-02-08 RX ADMIN — HYDROMORPHONE HYDROCHLORIDE 0.5 MILLIGRAM(S): 2 INJECTION INTRAMUSCULAR; INTRAVENOUS; SUBCUTANEOUS at 08:49

## 2024-02-08 RX ADMIN — HYDROMORPHONE HYDROCHLORIDE 0.5 MILLIGRAM(S): 2 INJECTION INTRAMUSCULAR; INTRAVENOUS; SUBCUTANEOUS at 17:16

## 2024-02-08 RX ADMIN — HYDROMORPHONE HYDROCHLORIDE 0.5 MILLIGRAM(S): 2 INJECTION INTRAMUSCULAR; INTRAVENOUS; SUBCUTANEOUS at 12:51

## 2024-02-08 RX ADMIN — Medication 1000 MILLIGRAM(S): at 05:21

## 2024-02-08 RX ADMIN — SENNA PLUS 2 TABLET(S): 8.6 TABLET ORAL at 21:27

## 2024-02-08 RX ADMIN — CELECOXIB 200 MILLIGRAM(S): 200 CAPSULE ORAL at 21:26

## 2024-02-08 RX ADMIN — HYDROMORPHONE HYDROCHLORIDE 0.5 MILLIGRAM(S): 2 INJECTION INTRAMUSCULAR; INTRAVENOUS; SUBCUTANEOUS at 21:29

## 2024-02-08 RX ADMIN — POLYETHYLENE GLYCOL 3350 17 GRAM(S): 17 POWDER, FOR SOLUTION ORAL at 21:27

## 2024-02-08 RX ADMIN — OXYCODONE HYDROCHLORIDE 10 MILLIGRAM(S): 5 TABLET ORAL at 05:21

## 2024-02-08 RX ADMIN — HYDROMORPHONE HYDROCHLORIDE 0.5 MILLIGRAM(S): 2 INJECTION INTRAMUSCULAR; INTRAVENOUS; SUBCUTANEOUS at 08:34

## 2024-02-08 RX ADMIN — BUDESONIDE AND FORMOTEROL FUMARATE DIHYDRATE 2 PUFF(S): 160; 4.5 AEROSOL RESPIRATORY (INHALATION) at 07:47

## 2024-02-08 RX ADMIN — OXYCODONE HYDROCHLORIDE 10 MILLIGRAM(S): 5 TABLET ORAL at 05:51

## 2024-02-08 RX ADMIN — HYDROMORPHONE HYDROCHLORIDE 0.5 MILLIGRAM(S): 2 INJECTION INTRAMUSCULAR; INTRAVENOUS; SUBCUTANEOUS at 21:59

## 2024-02-08 RX ADMIN — OXYCODONE HYDROCHLORIDE 10 MILLIGRAM(S): 5 TABLET ORAL at 14:25

## 2024-02-08 RX ADMIN — OXYCODONE HYDROCHLORIDE 10 MILLIGRAM(S): 5 TABLET ORAL at 09:46

## 2024-02-08 RX ADMIN — BUDESONIDE AND FORMOTEROL FUMARATE DIHYDRATE 2 PUFF(S): 160; 4.5 AEROSOL RESPIRATORY (INHALATION) at 21:24

## 2024-02-08 RX ADMIN — HYDROMORPHONE HYDROCHLORIDE 0.5 MILLIGRAM(S): 2 INJECTION INTRAMUSCULAR; INTRAVENOUS; SUBCUTANEOUS at 17:31

## 2024-02-08 RX ADMIN — Medication 325 MILLIGRAM(S): at 09:37

## 2024-02-08 NOTE — PROGRESS NOTE ADULT - SUBJECTIVE AND OBJECTIVE BOX
Patient tolerated the procedure well. Patient seen and examined at bedside. No acute complaints at this time. Pain well controlled. Denies chest pain, shortness of breath, nausea or vomiting.     LABS:                        13.4   7.62  )-----------( 169      ( 07 Feb 2024 10:50 )             40.2     02-07    135  |  105  |  12  ----------------------------<  150<H>  4.2   |  30  |  1.02    Ca    8.8      07 Feb 2024 10:50        Urinalysis Basic - ( 07 Feb 2024 10:50 )    Color: x / Appearance: x / SG: x / pH: x  Gluc: 150 mg/dL / Ketone: x  / Bili: x / Urobili: x   Blood: x / Protein: x / Nitrite: x   Leuk Esterase: x / RBC: x / WBC x   Sq Epi: x / Non Sq Epi: x / Bacteria: x        VITAL SIGNS:  T(C): 36.4 (02-08-24 @ 04:10), Max: 36.9 (02-07-24 @ 16:00)  HR: 71 (02-08-24 @ 04:10) (71 - 96)  BP: 107/68 (02-08-24 @ 04:10) (102/66 - 148/93)  RR: 18 (02-08-24 @ 04:10) (14 - 18)  SpO2: 98% (02-08-24 @ 04:10) (92% - 98%)      General: NAD, resting comfortably in bed  LUE:   Dressing C/D/I  Compartments soft and compressible  Weakness of Ulnar and PIN Nerve function, improving since yesterday  +Axillary/Musculocutaneous/Radial/Median/AIN  SILT C5-T1  2+ radial pulses    No calf tenderness bilaterally      A/P:  63y m s/p L TSA POD1  -Per patient, improving neurologic exam  -PT/OT   -NWB LUE, SI  -Pain Control  -DVT ppx starting POD1  -Continue perioperative abx x 24 hours  -FU AM Labs  -Rest, ice, compress and elevate the extremity as we needed  -Incentive Spirometry  -Medical management appreciated  -Will discuss plan with Dr. Chaudhary and will advise changes to plan as needed

## 2024-02-08 NOTE — PROGRESS NOTE ADULT - SUBJECTIVE AND OBJECTIVE BOX
PCP: Dr. Sher Louise     CHIEF COMPLAINT: left shoulder pain     HISTORY OF THE PRESENT ILLNESS: this is a 64 yo with PMH of  GERD, barretts esophagus, asthma, HLD, OA of multiple joints and multiple joint replacements. he is presenting with progressive pain with movement, limited ROM and INC pain at night, who failed the usual modalities for pain and is now s/p left shoulder arthoplasty.    We are consulted for medical management    pt seeen OOB to chair, LUE in sling, block wearing off and is having more pain,  denies any CP or sob, shi po, no N/V,  + voiding    REVIEW OF SYSTEMS:   All 10 systems reviewed in detailed and found to be negative with the exception of what has already been described above    Vital Signs Last 24 Hrs  T(C): 36.5 (02-08-24 @ 11:24), Max: 37.2 (02-08-24 @ 09:00)  T(F): 97.7 (02-08-24 @ 11:24), Max: 98.9 (02-08-24 @ 09:00)  HR: 72 (02-08-24 @ 11:24) (65 - 86)  BP: 112/64 (02-08-24 @ 11:24) (102/66 - 129/65)  BP(mean): 77 (02-07-24 @ 20:00) (74 - 77)  RR: 16 (02-08-24 @ 11:24) (16 - 18)  SpO2: 97% (02-08-24 @ 11:24) (94% - 98%)    MEDICATIONS  (STANDING):  acetaminophen     Tablet .. 1000 milliGRAM(s) Oral every 8 hours  aspirin enteric coated 325 milliGRAM(s) Oral daily  budesonide 160 MICROgram(s)/formoterol 4.5 MICROgram(s) Inhaler 2 Puff(s) Inhalation two times a day  celecoxib 200 milliGRAM(s) Oral every 12 hours  lactated ringers. 1000 milliLiter(s) (75 mL/Hr) IV Continuous <Continuous>  pantoprazole    Tablet 40 milliGRAM(s) Oral before breakfast  polyethylene glycol 3350 17 Gram(s) Oral at bedtime  senna 2 Tablet(s) Oral at bedtime  tranexamic acid IVPB 1000 milliGRAM(s) IV Intermittent once  tranexamic acid IVPB 1000 milliGRAM(s) IV Intermittent once    MEDICATIONS  (PRN):  albuterol    90 MICROgram(s) HFA Inhaler 2 Puff(s) Inhalation every 6 hours PRN Shortness of Breath and/or Wheezing  HYDROmorphone  Injectable 0.5 milliGRAM(s) SubCutaneous every 4 hours PRN Severe Pain (7 - 10)  ondansetron Injectable 8 milliGRAM(s) IV Push every 8 hours PRN Nausea and/or Vomiting  oxyCODONE    IR 10 milliGRAM(s) Oral every 4 hours PRN Moderate Pain (4 - 6)  oxyCODONE    IR 5 milliGRAM(s) Oral every 4 hours PRN Mild Pain (1 - 3)  prochlorperazine   Injectable 10 milliGRAM(s) IV Push every 8 hours PRN Nausea and/or Vomiting    PHYSICAL EXAM:    GENERAL: Comfortable, no acute distress   HEAD:  Normocephalic, atraumatic  EYES: EOMI, PERRLA  HEENT: Moist mucous membranes  NECK: Supple, No JVD  NERVOUS SYSTEM:  Alert & Oriented X3, Motor Strength 5/5 B/L upper and lower extremities  CHEST/LUNG: Clear to auscultation bilaterally  HEART: Regular rate and rhythm  ABDOMEN: Soft, non tender, Nondistended, Bowel sounds present  GENITOURINARY: Voiding, no palpable bladder  EXTREMITIES:   No clubbing, cyanosis, or edema  MUSCULOSKELETAL- LUE in sling, + sensation, + radial pulse, dsg c/d/i,   SKIN-no rash    LABS:                                10.5   8.14  )-----------( 162      ( 08 Feb 2024 08:13 )             31.7       02-08    137  |  107  |  13  ----------------------------<  105<H>  3.6   |  27  |  0.70    Ca    8.4<L>      08 Feb 2024 08:13              IMPRESSION: 64 yo with above pmh a/w:  # OA of left shoulder   # S/P left shoulder arthoplasty     POD#1  pain control  PT eval  Bowel regimen  IVF's  Finish ABXs  regular diet  PPI  VTE prophylaxis  monitor CBC/BMP    # asthma   therapeutic interchange for advair 250/50     # Vte prophylaxis  caprini 8 (prior squamous cell CA, 45 mins surgery, prior small bowel resection Dec 2023, BMI > 25, 62 y/o male) consider asa 81 mg bid   Venodynes  INC mobility    dispo; likely home tomorrow       PCP: Dr. Sher Louise     CHIEF COMPLAINT: left shoulder pain     HISTORY OF THE PRESENT ILLNESS: this is a 62 yo with PMH of  GERD, barretts esophagus, asthma, HLD, OA of multiple joints and multiple joint replacements. he is presenting with progressive pain with movement, limited ROM and INC pain at night, who failed the usual modalities for pain and is now s/p left shoulder arthoplasty.    We are consulted for medical management    pt seeen OOB to chair, LUE in sling, block wearing off and is having more pain,  denies any CP or sob, shi po, no N/V,  + voiding    REVIEW OF SYSTEMS:   All 10 systems reviewed in detailed and found to be negative with the exception of what has already been described above    Vital Signs Last 24 Hrs  T(C): 36.5 (02-08-24 @ 11:24), Max: 37.2 (02-08-24 @ 09:00)  T(F): 97.7 (02-08-24 @ 11:24), Max: 98.9 (02-08-24 @ 09:00)  HR: 72 (02-08-24 @ 11:24) (65 - 86)  BP: 112/64 (02-08-24 @ 11:24) (102/66 - 129/65)  BP(mean): 77 (02-07-24 @ 20:00) (74 - 77)  RR: 16 (02-08-24 @ 11:24) (16 - 18)  SpO2: 97% (02-08-24 @ 11:24) (94% - 98%)    MEDICATIONS  (STANDING):  acetaminophen     Tablet .. 1000 milliGRAM(s) Oral every 8 hours  aspirin enteric coated 325 milliGRAM(s) Oral daily  budesonide 160 MICROgram(s)/formoterol 4.5 MICROgram(s) Inhaler 2 Puff(s) Inhalation two times a day  celecoxib 200 milliGRAM(s) Oral every 12 hours  lactated ringers. 1000 milliLiter(s) (75 mL/Hr) IV Continuous <Continuous>  pantoprazole    Tablet 40 milliGRAM(s) Oral before breakfast  polyethylene glycol 3350 17 Gram(s) Oral at bedtime  senna 2 Tablet(s) Oral at bedtime  tranexamic acid IVPB 1000 milliGRAM(s) IV Intermittent once  tranexamic acid IVPB 1000 milliGRAM(s) IV Intermittent once    MEDICATIONS  (PRN):  albuterol    90 MICROgram(s) HFA Inhaler 2 Puff(s) Inhalation every 6 hours PRN Shortness of Breath and/or Wheezing  HYDROmorphone  Injectable 0.5 milliGRAM(s) SubCutaneous every 4 hours PRN Severe Pain (7 - 10)  ondansetron Injectable 8 milliGRAM(s) IV Push every 8 hours PRN Nausea and/or Vomiting  oxyCODONE    IR 10 milliGRAM(s) Oral every 4 hours PRN Moderate Pain (4 - 6)  oxyCODONE    IR 5 milliGRAM(s) Oral every 4 hours PRN Mild Pain (1 - 3)  prochlorperazine   Injectable 10 milliGRAM(s) IV Push every 8 hours PRN Nausea and/or Vomiting    PHYSICAL EXAM:    GENERAL: Comfortable, no acute distress   HEAD:  Normocephalic, atraumatic  EYES: EOMI, PERRLA  HEENT: Moist mucous membranes  NECK: Supple, No JVD  NERVOUS SYSTEM:  Alert & Oriented X3, Motor Strength 5/5 B/L upper and lower extremities  CHEST/LUNG: Clear to auscultation bilaterally  HEART: Regular rate and rhythm  ABDOMEN: Soft, non tender, Nondistended, Bowel sounds present  GENITOURINARY: Voiding, no palpable bladder  EXTREMITIES:   No clubbing, cyanosis, or edema  MUSCULOSKELETAL- LUE in sling, + sensation, + radial pulse, dsg c/d/i,   SKIN-no rash    LABS:                                10.5   8.14  )-----------( 162      ( 08 Feb 2024 08:13 )             31.7       02-08    137  |  107  |  13  ----------------------------<  105<H>  3.6   |  27  |  0.70    Ca    8.4<L>      08 Feb 2024 08:13              IMPRESSION: 62 yo with above pmh a/w:  # OA of left shoulder   # S/P left shoulder arthoplasty     POD#1  pain control  PT eval  Bowel regimen  IVF's  Finish ABXs  regular diet  PPI  VTE prophylaxis  monitor CBC/BMP    #acute blood loss anemia  d/t surgery  no need for transfusion presently  check cbc in am    # asthma   therapeutic interchange for advair 250/50     # Vte prophylaxis  caprini 8 (prior squamous cell CA, 45 mins surgery, prior small bowel resection Dec 2023, BMI > 25, 62 y/o male) consider asa 81 mg bid   Venodynes  INC mobility    dispo; likely home tomorrow

## 2024-02-09 ENCOUNTER — TRANSCRIPTION ENCOUNTER (OUTPATIENT)
Age: 64
End: 2024-02-09

## 2024-02-09 VITALS
RESPIRATION RATE: 18 BRPM | HEART RATE: 69 BPM | OXYGEN SATURATION: 96 % | TEMPERATURE: 98 F | SYSTOLIC BLOOD PRESSURE: 121 MMHG | DIASTOLIC BLOOD PRESSURE: 68 MMHG

## 2024-02-09 LAB
ANION GAP SERPL CALC-SCNC: 1 MMOL/L — LOW (ref 5–17)
BUN SERPL-MCNC: 19 MG/DL — SIGNIFICANT CHANGE UP (ref 7–23)
CALCIUM SERPL-MCNC: 8.7 MG/DL — SIGNIFICANT CHANGE UP (ref 8.5–10.1)
CHLORIDE SERPL-SCNC: 107 MMOL/L — SIGNIFICANT CHANGE UP (ref 96–108)
CO2 SERPL-SCNC: 30 MMOL/L — SIGNIFICANT CHANGE UP (ref 22–31)
CREAT SERPL-MCNC: 0.89 MG/DL — SIGNIFICANT CHANGE UP (ref 0.5–1.3)
EGFR: 95 ML/MIN/1.73M2 — SIGNIFICANT CHANGE UP
GLUCOSE SERPL-MCNC: 99 MG/DL — SIGNIFICANT CHANGE UP (ref 70–99)
HCT VFR BLD CALC: 33.8 % — LOW (ref 39–50)
HGB BLD-MCNC: 11 G/DL — LOW (ref 13–17)
MCHC RBC-ENTMCNC: 32.1 PG — SIGNIFICANT CHANGE UP (ref 27–34)
MCHC RBC-ENTMCNC: 32.5 GM/DL — SIGNIFICANT CHANGE UP (ref 32–36)
MCV RBC AUTO: 98.5 FL — SIGNIFICANT CHANGE UP (ref 80–100)
PLATELET # BLD AUTO: 166 K/UL — SIGNIFICANT CHANGE UP (ref 150–400)
POTASSIUM SERPL-MCNC: 4.3 MMOL/L — SIGNIFICANT CHANGE UP (ref 3.5–5.3)
POTASSIUM SERPL-SCNC: 4.3 MMOL/L — SIGNIFICANT CHANGE UP (ref 3.5–5.3)
RBC # BLD: 3.43 M/UL — LOW (ref 4.2–5.8)
RBC # FLD: 13.3 % — SIGNIFICANT CHANGE UP (ref 10.3–14.5)
SODIUM SERPL-SCNC: 138 MMOL/L — SIGNIFICANT CHANGE UP (ref 135–145)
WBC # BLD: 7.12 K/UL — SIGNIFICANT CHANGE UP (ref 3.8–10.5)
WBC # FLD AUTO: 7.12 K/UL — SIGNIFICANT CHANGE UP (ref 3.8–10.5)

## 2024-02-09 PROCEDURE — 99232 SBSQ HOSP IP/OBS MODERATE 35: CPT

## 2024-02-09 RX ADMIN — Medication 1000 MILLIGRAM(S): at 04:59

## 2024-02-09 RX ADMIN — PANTOPRAZOLE SODIUM 40 MILLIGRAM(S): 20 TABLET, DELAYED RELEASE ORAL at 04:59

## 2024-02-09 RX ADMIN — Medication 325 MILLIGRAM(S): at 09:05

## 2024-02-09 RX ADMIN — CELECOXIB 200 MILLIGRAM(S): 200 CAPSULE ORAL at 09:05

## 2024-02-09 RX ADMIN — OXYCODONE HYDROCHLORIDE 10 MILLIGRAM(S): 5 TABLET ORAL at 09:05

## 2024-02-09 RX ADMIN — OXYCODONE HYDROCHLORIDE 10 MILLIGRAM(S): 5 TABLET ORAL at 09:35

## 2024-02-09 NOTE — PATIENT PROFILE ADULT - FALL HARM RISK - HARM RISK INTERVENTIONS

## 2024-02-09 NOTE — PROGRESS NOTE ADULT - SUBJECTIVE AND OBJECTIVE BOX
Patient tolerated the procedure well. Patient seen and examined at bedside. No acute complaints at this time. Pain well controlled. Denies chest pain, shortness of breath, nausea or vomiting.     LABS:                        10.5   8.14  )-----------( 162      ( 08 Feb 2024 08:13 )             31.7     02-08    137  |  107  |  13  ----------------------------<  105<H>  3.6   |  27  |  0.70    Ca    8.4<L>      08 Feb 2024 08:13        Urinalysis Basic - ( 08 Feb 2024 08:13 )    Color: x / Appearance: x / SG: x / pH: x  Gluc: 105 mg/dL / Ketone: x  / Bili: x / Urobili: x   Blood: x / Protein: x / Nitrite: x   Leuk Esterase: x / RBC: x / WBC x   Sq Epi: x / Non Sq Epi: x / Bacteria: x        VITAL SIGNS:  T(C): 36.4 (02-09-24 @ 04:00), Max: 37.2 (02-08-24 @ 09:00)  HR: 66 (02-09-24 @ 04:00) (65 - 77)  BP: 115/52 (02-09-24 @ 04:00) (109/57 - 129/65)  RR: 18 (02-09-24 @ 04:00) (16 - 18)  SpO2: 97% (02-09-24 @ 04:00) (95% - 99%)    PE:   General: NAD, resting comfortably in bed  LUE:   Dressing C/D/I  Compartments soft and compressible  Weakness of Ulnar and PIN Nerve function, improving since yesterday  +Axillary/Musculocutaneous/Radial/Median/AIN  SILT C5-T1  2+ radial pulses    No calf tenderness bilaterally      A/P:  63y m s/p L TSA POD2    -Per patient, improving neurologic exam  -PT/OT   -NWB LUE, SI  -Pain Control  -DVT ppx  -FU AM Labs  -Rest, ice, compress and elevate the extremity as we needed  -Incentive Spirometry  -Medical management appreciated  -Will discuss plan with Dr. Chaudhary and will advise changes to plan as needed Patient tolerated the procedure well. Patient seen and examined at bedside. No acute complaints at this time. Pain well controlled. Denies chest pain, shortness of breath, nausea or vomiting.     LABS:                        10.5   8.14  )-----------( 162      ( 08 Feb 2024 08:13 )             31.7     02-08    137  |  107  |  13  ----------------------------<  105<H>  3.6   |  27  |  0.70    Ca    8.4<L>      08 Feb 2024 08:13        Urinalysis Basic - ( 08 Feb 2024 08:13 )    Color: x / Appearance: x / SG: x / pH: x  Gluc: 105 mg/dL / Ketone: x  / Bili: x / Urobili: x   Blood: x / Protein: x / Nitrite: x   Leuk Esterase: x / RBC: x / WBC x   Sq Epi: x / Non Sq Epi: x / Bacteria: x        VITAL SIGNS:  T(C): 36.4 (02-09-24 @ 04:00), Max: 37.2 (02-08-24 @ 09:00)  HR: 66 (02-09-24 @ 04:00) (65 - 77)  BP: 115/52 (02-09-24 @ 04:00) (109/57 - 129/65)  RR: 18 (02-09-24 @ 04:00) (16 - 18)  SpO2: 97% (02-09-24 @ 04:00) (95% - 99%)    PE:   General: NAD, resting comfortably in bed  LUE:   Dressing C/D/I  Compartments soft and compressible  + AIN/PIN/U/M/R - Weak   +Axillary/Musculocutaneous  SILT C5-T1  2+ radial pulses  No calf tenderness bilaterally      A/P:  63y m s/p L TSA POD2    -Per patient, improving neurologic exam  -PT/OT   -NWB LUE, SI  -Pain Control  -DVT ppx  -FU AM Labs  -Rest, ice, compress and elevate the extremity as we needed  -Incentive Spirometry  -Medical management appreciated  Dispo: Home  -Will discuss plan with Dr. Chaudhary and will advise changes to plan as needed

## 2024-02-09 NOTE — PROGRESS NOTE ADULT - SUBJECTIVE AND OBJECTIVE BOX
PCP: Dr. Sher Louise     CHIEF COMPLAINT: left shoulder pain     HISTORY OF THE PRESENT ILLNESS: this is a 64 yo with PMH of  GERD, barretts esophagus, asthma, HLD, OA of multiple joints and multiple joint replacements. He is presenting with progressive pain with movement, limited ROM and INC pain at night, who failed the usual modalities for pain and is now s/p left shoulder arthoplasty.    We are consulted for medical management    pt seen OOB to chair, LUE in sling, block wearing off and is having more pain,  denies any CP or sob, shi po, no N/V,  + voiding    REVIEW OF SYSTEMS:   All 10 systems reviewed in detailed and found to be negative with the exception of what has already been described above    Vital Signs Last 24 Hrs  T(C): 36.5 (09 Feb 2024 08:22), Max: 36.6 (08 Feb 2024 16:00)  T(F): 97.7 (09 Feb 2024 08:22), Max: 97.9 (08 Feb 2024 16:00)  HR: 69 (09 Feb 2024 08:22) (66 - 77)  BP: 121/68 (09 Feb 2024 08:22) (109/57 - 121/68)  BP(mean): 73 (08 Feb 2024 20:00) (73 - 82)  RR: 18 (09 Feb 2024 08:22) (16 - 18)  SpO2: 96% (09 Feb 2024 08:22) (95% - 99%)    Parameters below as of 09 Feb 2024 08:22  Patient On (Oxygen Delivery Method): room air      MEDICATIONS  (STANDING):  acetaminophen     Tablet .. 1000 milliGRAM(s) Oral every 8 hours  aspirin enteric coated 325 milliGRAM(s) Oral daily  budesonide 160 MICROgram(s)/formoterol 4.5 MICROgram(s) Inhaler 2 Puff(s) Inhalation two times a day  celecoxib 200 milliGRAM(s) Oral every 12 hours  lactated ringers. 1000 milliLiter(s) (75 mL/Hr) IV Continuous <Continuous>  pantoprazole    Tablet 40 milliGRAM(s) Oral before breakfast  polyethylene glycol 3350 17 Gram(s) Oral at bedtime  senna 2 Tablet(s) Oral at bedtime  tranexamic acid IVPB 1000 milliGRAM(s) IV Intermittent once  tranexamic acid IVPB 1000 milliGRAM(s) IV Intermittent once    MEDICATIONS  (PRN):  albuterol    90 MICROgram(s) HFA Inhaler 2 Puff(s) Inhalation every 6 hours PRN Shortness of Breath and/or Wheezing  bisacodyl Suppository 10 milliGRAM(s) Rectal daily PRN Constipation  HYDROmorphone  Injectable 0.5 milliGRAM(s) SubCutaneous every 4 hours PRN Severe Pain (7 - 10)  ondansetron Injectable 8 milliGRAM(s) IV Push every 8 hours PRN Nausea and/or Vomiting  oxyCODONE    IR 10 milliGRAM(s) Oral every 4 hours PRN Moderate Pain (4 - 6)  oxyCODONE    IR 5 milliGRAM(s) Oral every 4 hours PRN Mild Pain (1 - 3)  prochlorperazine   Injectable 10 milliGRAM(s) IV Push every 8 hours PRN Nausea and/or Vomiting    PHYSICAL EXAM:    GENERAL: Comfortable, no acute distress   HEAD:  Normocephalic, atraumatic  EYES: EOMI, PERRLA  HEENT: Moist mucous membranes  NECK: Supple, No JVD  NERVOUS SYSTEM:  Alert & Oriented X3, Motor Strength 5/5 B/L upper and lower extremities  CHEST/LUNG: Clear to auscultation bilaterally  HEART: Regular rate and rhythm  ABDOMEN: Soft, non tender, Nondistended, Bowel sounds present  GENITOURINARY: Voiding, no palpable bladder  EXTREMITIES:   No clubbing, cyanosis, or edema  MUSCULOSKELETAL- LUE in sling, + sensation, + radial pulse, dsg c/d/i, Left wrist weak, able to make partial fist, but has difficulty opening fingers  SKIN-no rash    LABS:                              11.0   7.12  )-----------( 166      ( 09 Feb 2024 07:20 )             33.8       02-09    138  |  107  |  19  ----------------------------<  99  4.3   |  30  |  0.89    Ca    8.7      09 Feb 2024 07:20                    IMPRESSION: 64 yo with above pmh a/w:  # OA of left shoulder   # S/P left shoulder arthoplasty     POD#2  pain control  PT eval  Bowel regimen  regular diet  PPI  VTE prophylaxis  monitor CBC/BMP      # left hand weakness  seen by ortho  narda omalley today will f/u with Dr Chaudhary    #acute blood loss anemia  d/t surgery  no need for transfusion presently  HH stable    # asthma   therapeutic interchange for advair 250/50  denies any cp or sob  satting fine on RA     # Vte prophylaxis  caprini 8 (prior squamous cell CA, 45 mins surgery, prior small bowel resection Dec 2023, BMI > 25, 64 y/o male) consider asa 81 mg bid   Venodynes  INC mobility    dispo; home today

## 2024-02-09 NOTE — PATIENT PROFILE ADULT - NSPROPTRIGHTBILLOFRIGHTS_GEN_A_NUR
Writer contacted Dr. Steven Macias about telemetry monitoring expiring. Per MD, okay to discontinue.   patient

## 2024-02-09 NOTE — DISCHARGE NOTE NURSING/CASE MANAGEMENT/SOCIAL WORK - PATIENT PORTAL LINK FT
You can access the FollowMyHealth Patient Portal offered by [facility] by registering at the following website: [email or web address]. By joining [email or web address]’s FollowMyHealth portal, you will also be able to view your health information using other applications (apps) compatible with our system.

## 2024-02-12 DIAGNOSIS — M19.012 PRIMARY OSTEOARTHRITIS, LEFT SHOULDER: ICD-10-CM

## 2024-02-15 ENCOUNTER — APPOINTMENT (OUTPATIENT)
Dept: ORTHOPEDIC SURGERY | Facility: CLINIC | Age: 64
End: 2024-02-15
Payer: MEDICARE

## 2024-02-15 ENCOUNTER — NON-APPOINTMENT (OUTPATIENT)
Age: 64
End: 2024-02-15

## 2024-02-15 DIAGNOSIS — M21.332 WRIST DROP, LEFT WRIST: ICD-10-CM

## 2024-02-15 PROCEDURE — 99024 POSTOP FOLLOW-UP VISIT: CPT

## 2024-02-15 PROCEDURE — 73030 X-RAY EXAM OF SHOULDER: CPT | Mod: RT

## 2024-02-15 NOTE — ADDENDUM
[FreeTextEntry1] : Documented by Ruby Graf acting as a scribe for Dr. Chaudhary on 02/15/2024. All medical record entries made by the Scribe were at my, Dr. Chaudhary's, direction and personally dictated by me on 02/15/2024. I have reviewed the chart and agree that the record accurately reflects my personal performance of the history, physical exam, procedure and imaging.

## 2024-02-15 NOTE — HISTORY OF PRESENT ILLNESS
[___ Weeks Post Op] : [unfilled] weeks post op [Xray (Date:___)] : [unfilled] Xray -  [Slow Progress] : is progressing slowly [No Sign of Infection] : is showing no signs of infection [Adequate Pain Control] : has adequate pain control [de-identified] : SPO Left total shoulder replacement, conventional DOS: 2/7/24 [de-identified] : Patient is here today for 1st post operative visit.  SPO Left total shoulder replacement, conventional DOS: 2/7/24 He denies fever or chills, redness around or near the incision site(s), numbness/tingling. He denies nausea/ vomiting and admits to their appetite since their surgery being back to normal. Normal bowel habits at this time. Patient has started physical therapy. Patient presents today wearing a shoulder sling and reports an inability to flex his wrist.  [de-identified] : Incisions are clean, dry and intact. No surrounding erythema. No drainage. Wound appears to be healing well. He has a wrist drop.  [de-identified] : 2 views of the left shoulder were obtained today that show no dislocation. Hardware in good alignment. There is good healing appreciated. [de-identified] : I had a discussion with the patient regarding the operative and postoperative course. The patient has a wrist drop. He should continue with physical therapy and begin OT. Patient will follow up in 4 wks for repeat clinical assessment. All questions were answered and the patient verbalized understanding. The patient is in agreement with this treatment plan.

## 2024-02-16 ENCOUNTER — APPOINTMENT (OUTPATIENT)
Dept: ORTHOPEDIC SURGERY | Facility: CLINIC | Age: 64
End: 2024-02-16
Payer: MEDICARE

## 2024-02-16 VITALS — WEIGHT: 235 LBS | HEIGHT: 67 IN | BODY MASS INDEX: 36.88 KG/M2

## 2024-02-16 DIAGNOSIS — Z96.651 PRESENCE OF RIGHT ARTIFICIAL KNEE JOINT: ICD-10-CM

## 2024-02-16 LAB — SURGICAL PATHOLOGY STUDY: SIGNIFICANT CHANGE UP

## 2024-02-16 PROCEDURE — 99215 OFFICE O/P EST HI 40 MIN: CPT

## 2024-02-16 PROCEDURE — 73562 X-RAY EXAM OF KNEE 3: CPT | Mod: RT

## 2024-02-16 PROCEDURE — G2211 COMPLEX E/M VISIT ADD ON: CPT

## 2024-02-16 RX ORDER — MELOXICAM 15 MG/1
15 TABLET ORAL DAILY
Qty: 30 | Refills: 1 | Status: ACTIVE | COMMUNITY
Start: 2024-02-16 | End: 1900-01-01

## 2024-02-16 NOTE — HISTORY OF PRESENT ILLNESS
[de-identified] : 2/16/24: Patient presents with right knee pain.  States he had a right knee replacement 2016 by Dr. Valentine at [facility].  He received a letter that he had recalled polyethylene from [employer].  States the swelling in his knee has increased.  He does have some discomfort medially.  Taking ibuprofen for the occasional pain.  Denies allergies, tobacco use, on 81 mg of aspirin due to a left shoulder replacement 9 days ago.  PMH-asthma

## 2024-02-16 NOTE — PHYSICAL EXAM
[de-identified] : Right knee Well-healed surgical scar Range of motion 0-1 20 Minimal laxity in coronal plane Moderate AP translation [de-identified] : 2/16/24: 3 views right knee-osteolysis on lateral tibial component

## 2024-02-16 NOTE — DISCUSSION/SUMMARY
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  As patient recently had shoulder replacement 9 days ago I discussed allowing him to heal from this prior to getting further into revision surgery.  Discussed this does likely require a full revision.  We discussed revision surgery as a high risk of infection, around 5% among other additional risks discussed this would also require an infectious workup however patient recently had surgery so his inflammatory markers will be elevated currently.  Recommended knee brace for now.  A prescription for meloxicam with the appropriate warnings for GI, cardiac, and renal side effects was given to the patient.  Patient was instructed not to use any other NSAIDs with this medication.  Patient will follow-up in a few months when he is healed from the shoulder surgery will move forward with the infectious workup at that time.  The patient's current medication management of their orthopedic diagnosis was reviewed today: (1) We discussed a comprehensive treatment plan that included possible pharmaceutical management involving the use of prescription strength medications including but not limited to options such as oral Ibuprofen 400mg QID, once daily Meloxicam 15 mg, or 500-650 mg Tylenol versus over the counter oral medications and topical prescription NSAID Pennsaid vs over the counter Voltaren gel. (2) There is a moderate risk of morbidity with further treatment, especially from use of prescription strength medications and possible side effects of these medications which include upset stomach with oral medications, skin reactions to topical medications and cardiac/renal issues with long term use. (3) I recommended that the patient follow-up with their medical physician to discuss any significant specific potential issues with long term medication use such as interactions with current medications or with exacerbation of underlying medical comorbidities. (4) The benefits and risks associated with use of injectable, oral or topical, prescription and over the counter anti-inflammatory medications were discussed with the patient. The patient voiced understanding of the risks including but not limited to bleeding, stroke, kidney dysfunction, heart disease, and were referred to the black box warning label for further information.

## 2024-03-12 ENCOUNTER — APPOINTMENT (OUTPATIENT)
Dept: ORTHOPEDIC SURGERY | Facility: CLINIC | Age: 64
End: 2024-03-12
Payer: MEDICARE

## 2024-03-12 PROCEDURE — 99024 POSTOP FOLLOW-UP VISIT: CPT

## 2024-03-12 NOTE — ADDENDUM
[FreeTextEntry1] : Documented by Ruby Graf acting as a scribe for Dr. Chaudhary on 03/12/2024. All medical record entries made by the Scribe were at my, Dr. Chaudhary's, direction and personally dictated by me on 03/12/2024. I have reviewed the chart and agree that the record accurately reflects my personal performance of the history, physical exam, procedure and imaging.

## 2024-03-12 NOTE — HISTORY OF PRESENT ILLNESS
[___ Weeks Post Op] : [unfilled] weeks post op [Slow Progress] : is progressing slowly [No Sign of Infection] : is showing no signs of infection [Adequate Pain Control] : has adequate pain control [de-identified] : SPO Left total shoulder replacement, conventional DOS: 2/7/24 [de-identified] : Patient is here today for 2nd post operative visit.  SPO Left total shoulder replacement, conventional DOS: 2/7/24 He denies fever or chills, redness around or near the incision site(s), numbness/tingling. He denies nausea/ vomiting and admits to their appetite since their surgery being back to normal. Normal bowel habits at this time. Patient has started physical therapy. Patient reports improvement with his wrist drop.  [de-identified] : Incisions are clean, dry and intact. No surrounding erythema. No drainage. Wound appears to be healing well. Motor and sensation are intact distally. He has full range of motion of all fingers with capillary refill of <2 seconds throughout. He has good nerve function and median nerve, ulnar, radial, PIN, AIN. He has no sensory deficits over the C5-T1 nerve roots. Radial pulses 2+. Able to make an A-OK sign and thumbs up sign: able to flex/extend thumb, able to cross middle over index finger. Full ROM elbow, wrist, hand [de-identified] : No new imaging today [de-identified] : I had a discussion with the patient regarding the operative and postoperative course. The patient has improvement with his wrist drop. I prescribed the patient Celebrex to be taken as directed. He should continue with physical therapy and OT. Patient will follow up in 4 wks for repeat clinical assessment. All questions were answered and the patient verbalized understanding. The patient is in agreement with this treatment plan.

## 2024-04-05 NOTE — ASU PATIENT PROFILE, ADULT - MENTAL HEALTH CONDITIONS/SYMPTOMS, PROFILE
64y with PMH of frequent falls, SDH s/p bilateral craniotomies for subdural evacuation (3/12/24 by Dr. Polanco) and b/l MMA embolization (3/19/24 by Dr. Weber) transferred to [facility] on 3/25 from [facility] due to confusion, right sided weakness and increased hemorrhage on CTH. Patient underwent left supratentorial craniotomy for evacuation of acute on chronic subdural hematoma (3/26) Hospital Course uncomplicated. Patient now admitted for a multidisciplinary rehab program.     # Acute on Chronic SDH s/p craniotomy/evacuation  - SDH s/p bilateral craniotomies for subdural evacuation (3/12/24 by Dr. Polanco) and b/l MMA embolization (3/19/24 by Dr. Weber)  - confusion, right sided weakness and increased hemorrhage on CTH (3/25)  - left supratentorial craniotomy for evacuation of acute on chronic subdural hematoma (3/26), staples intact, remove end of next week  - Impaired ADLs and mobility  - Need for assistance with personal care   - Start comprehensive rehab program of PT/OT/SLP - 3 hours a day, 5 days a week. P&O as needed   - Precautions: Fall, Aspiration, Seizure  - Keppra 500 mg BID    #Anemia  HgB 12.7 (4/4)    # Pain  - Tylenol PRN mild pain, Oxycodone 2.5 mg mod pain, oxycodone 5 mg severe pain--dc'd ocycodone, not taking 4/4  - Avoid sedating medications that may interfere with cognitive recovery    # Mood / Cognition  - Neuropsychology consult pending  - Recreation therapy  - Lexapro 5 mg QD - mood better    # GI / Bowel  - Senna qHS  - Miralax Daily  - GI ppx: protonix 40 mg QD    #  / Bladder  - dc PVR's  - Toileting schedule every 4 hours    # Skin / Pressure injury  - Skin assessment on admission performed - intact   - Monitor Incisions:    - Pressure Injury/Skin: OOB to chair, PT/OT  - nursing to monitor skin qShift    # Diet/Dysphagia:  - Diet Consistency: regular, vegan  - Aspiration Precautions  - SLP consult for swallow function evaluation and treatment  - Nutrition consult    # DVT prophylaxis:   - Lovenox 40 mg QD    Outpatient Follow-up:  Paetl Polanco  Neurosurgery  [address] [place] [address]  Phone: [phone]  Fax: [phone]  Follow Up Time: 1 month    IDT 4/3  SW: Lives with ex-wife, brother in law, and son. PH, 5 SHELBIE w/o HR. No steps inside  SLP: reg, mod-severe cog, language breakdow at higher level tasks, high level better with native tongue (jaydon), reduced fluency and naming, is a functional communicator  OT: Setup eat/groom, min A UBD/LBD/toileting/toilet transfer, mod A bathing.  Overall min A ambulation/ADL. Goal: SV ADLs and shower transfer  PT: CGA bed mob, min A transfer, amb 50' RW min-mod A with close WC follow.  4 steps and 2 HR CG-SV  Team Goals: amb to bathroom SV.  Improve standing balance for functional task  TDD: 4/13 home       none

## 2024-04-06 ENCOUNTER — RX RENEWAL (OUTPATIENT)
Age: 64
End: 2024-04-06

## 2024-04-16 ENCOUNTER — APPOINTMENT (OUTPATIENT)
Dept: ORTHOPEDIC SURGERY | Facility: CLINIC | Age: 64
End: 2024-04-16
Payer: MEDICARE

## 2024-04-16 DIAGNOSIS — Z96.612 PRESENCE OF LEFT ARTIFICIAL SHOULDER JOINT: ICD-10-CM

## 2024-04-16 PROCEDURE — 99024 POSTOP FOLLOW-UP VISIT: CPT

## 2024-04-16 NOTE — HISTORY OF PRESENT ILLNESS
[___ Weeks Post Op] : [unfilled] weeks post op [Slow Progress] : is progressing slowly [No Sign of Infection] : is showing no signs of infection [Adequate Pain Control] : has adequate pain control [de-identified] : SPO Left total shoulder replacement, conventional DOS: 2/7/24 [de-identified] : Patient is here today for 3rd post operative visit.  SPO Left total shoulder replacement, conventional DOS: 2/7/24 He denies fever or chills, redness around or near the incision site(s), numbness/tingling. He denies nausea/ vomiting and admits to their appetite since their surgery being back to normal. Normal bowel habits at this time. Patient has started physical therapy. Patient reports that he no longer has a wrist drop.  [de-identified] : Incisions are clean, dry and intact. No surrounding erythema. No drainage. Wound appears to be healing well. Motor and sensation are intact distally. He has full range of motion of all fingers with capillary refill of <2 seconds throughout. He has good nerve function and median nerve, ulnar, radial, PIN, AIN. He has no sensory deficits over the C5-T1 nerve roots. Radial pulses 2+. Able to make an A-OK sign and thumbs up sign: able to flex/extend thumb, able to cross middle over index finger. Full ROM elbow, wrist, hand. Active wrist extension, digital extension.  [de-identified] : No new imaging today [de-identified] : I had a discussion with the patient regarding the operative and postoperative course. The patient is doing well at this time. He should continue with physical therapy and OT. A new script was written for this today. Patient will follow up in 2-3 months for repeat clinical assessment. All questions were answered and the patient verbalized understanding. The patient is in agreement with this treatment plan.

## 2024-04-16 NOTE — ADDENDUM
[FreeTextEntry1] : Documented by Ruby Graf acting as a scribe for Dr. Chaudhary on 04/16/2024. All medical record entries made by the Scribe were at my, Dr. Chaudhary's, direction and personally dictated by me on 04/16/2024. I have reviewed the chart and agree that the record accurately reflects my personal performance of the history, physical exam, procedure and imaging.

## 2024-05-06 ENCOUNTER — RX RENEWAL (OUTPATIENT)
Age: 64
End: 2024-05-06

## 2024-05-07 NOTE — PATIENT PROFILE ADULT - NSPROEXTENSIONSOFSELF_GEN_A_NUR
Nurses Note -- 4 Eyes      5/6/2024   8:57 PM      Skin assessed during: Admit      [x] No Altered Skin Integrity Present    [x]Prevention Measures Documented      [] Yes- Altered Skin Integrity Present or Discovered   [] LDA Added if Not in Epic (Describe Wound)   [] New Altered Skin Integrity was Present on Admit and Documented in LDA   [] Wound Image Taken    Wound Care Consulted? No    Attending Nurse:  Alyce Lara    Second RN/Staff Member:   ALYCE Robledo          hearing aid

## 2024-05-09 ENCOUNTER — APPOINTMENT (OUTPATIENT)
Dept: SURGERY | Facility: CLINIC | Age: 64
End: 2024-05-09
Payer: MEDICARE

## 2024-05-09 PROCEDURE — 99215 OFFICE O/P EST HI 40 MIN: CPT

## 2024-05-20 ENCOUNTER — RESULT REVIEW (OUTPATIENT)
Age: 64
End: 2024-05-20

## 2024-05-20 ENCOUNTER — APPOINTMENT (OUTPATIENT)
Dept: CT IMAGING | Facility: CLINIC | Age: 64
End: 2024-05-20
Payer: MEDICARE

## 2024-05-20 PROCEDURE — 74160 CT ABDOMEN W/CONTRAST: CPT | Mod: MH

## 2024-06-03 ENCOUNTER — RX RENEWAL (OUTPATIENT)
Age: 64
End: 2024-06-03

## 2024-06-03 RX ORDER — CELECOXIB 200 MG/1
200 CAPSULE ORAL
Qty: 28 | Refills: 0 | Status: ACTIVE | COMMUNITY
Start: 2024-03-12 | End: 1900-01-01

## 2024-06-13 ENCOUNTER — APPOINTMENT (OUTPATIENT)
Dept: ORTHOPEDIC SURGERY | Facility: CLINIC | Age: 64
End: 2024-06-13
Payer: MEDICARE

## 2024-06-13 VITALS
BODY MASS INDEX: 35.61 KG/M2 | HEIGHT: 68 IN | SYSTOLIC BLOOD PRESSURE: 118 MMHG | WEIGHT: 235 LBS | DIASTOLIC BLOOD PRESSURE: 78 MMHG | HEART RATE: 96 BPM

## 2024-06-13 DIAGNOSIS — T84.032A MECHANICAL LOOSENING OF INTERNAL RIGHT KNEE PROSTHETIC JOINT, INITIAL ENCOUNTER: ICD-10-CM

## 2024-06-13 PROCEDURE — 99215 OFFICE O/P EST HI 40 MIN: CPT | Mod: 25

## 2024-06-13 PROCEDURE — 73562 X-RAY EXAM OF KNEE 3: CPT | Mod: RT

## 2024-06-13 PROCEDURE — 20610 DRAIN/INJ JOINT/BURSA W/O US: CPT | Mod: RT

## 2024-06-13 RX ORDER — MELOXICAM 15 MG/1
15 TABLET ORAL DAILY
Qty: 30 | Refills: 2 | Status: ACTIVE | COMMUNITY
Start: 2024-06-13 | End: 1900-01-01

## 2024-06-13 NOTE — HISTORY OF PRESENT ILLNESS
[de-identified] : 6/13/24: Patient here for follow-up right knee pain.  States the knee is swelling more often.  Does have pain medially laterally.  States he did well with his shoulder surgery.  He does however need a hernia surgery in the next couple weeks.  Meloxicam does help with the pain.  Pain is at night.  Is thinking he would like the surgery in the late fall/winter.  He does have a case number from [employer].  2/16/24: Patient presents with right knee pain.  States he had a right knee replacement 2016 by Dr. Valentine at [facility].  He received a letter that he had recalled polyethylene from [employer].  States the swelling in his knee has increased.  He does have some discomfort medially.  Taking ibuprofen for the occasional pain.  Denies allergies, tobacco use, on 81 mg of aspirin due to a left shoulder replacement 9 days ago.  PMH-asthma

## 2024-06-13 NOTE — DISCUSSION/SUMMARY
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  As patient recently had shoulder replacement 9 days ago I discussed allowing him to heal from this prior to getting further into revision surgery.  Discussed this does likely require a full revision.  We discussed revision surgery as a high risk of infection, around 5% among other additional risks. right knee was aspirated and fluid sent for cell count, culture, crystals as well as NexGen sequencing.  I will call the patient if the results are concerning. Recommended knee brace for now.  A refill for meloxicam with the appropriate warnings for GI, cardiac, and renal side effects was given to the patient.  He was given a tentative surgical date.  I like to see him 2 to 3 months prior to surgery.  Will need medical clearance.  Patient was instructed not to use any other NSAIDs with this medication.    The patient's current medication management of their orthopedic diagnosis was reviewed today: (1) We discussed a comprehensive treatment plan that included possible pharmaceutical management involving the use of prescription strength medications including but not limited to options such as oral Ibuprofen 400mg QID, once daily Meloxicam 15 mg, or 500-650 mg Tylenol versus over the counter oral medications and topical prescription NSAID Pennsaid vs over the counter Voltaren gel. (2) There is a moderate risk of morbidity with further treatment, especially from use of prescription strength medications and possible side effects of these medications which include upset stomach with oral medications, skin reactions to topical medications and cardiac/renal issues with long term use. (3) I recommended that the patient follow-up with their medical physician to discuss any significant specific potential issues with long term medication use such as interactions with current medications or with exacerbation of underlying medical comorbidities. (4) The benefits and risks associated with use of injectable, oral or topical, prescription and over the counter anti-inflammatory medications were discussed with the patient. The patient voiced understanding of the risks including but not limited to bleeding, stroke, kidney dysfunction, heart disease, and were referred to the black box warning label for further information.   [employer] DX [employer] tracking [id number]

## 2024-06-13 NOTE — PROCEDURE
[de-identified] : 6/13/24:: Right knee Aspiration The risks, benefits, and alternatives of the procedure were reviewed/discussed with the patient today in office and all of their questions were answered. The patient consented to the procedure. The lateral aspiration site was prepped with chloroprep.  Utilizing sterile technique, 45 cc of straw-colored synovial fluid was aspirated. A sterile bandage was applied. The patient tolerated the procedure well and there were no complications.

## 2024-06-13 NOTE — PHYSICAL EXAM
[de-identified] : Right knee Well-healed surgical scar Positive effusion Range of motion 0-1 20 Minimal laxity in coronal plane Moderate AP translation [de-identified] : 6/13/24:3 views right knee-osteolysis on lateral tibial component, unchanged 2/16/24: 3 views right knee-osteolysis on lateral tibial component

## 2024-06-14 ENCOUNTER — NON-APPOINTMENT (OUTPATIENT)
Age: 64
End: 2024-06-14

## 2024-06-14 LAB
B PERT IGG+IGM PNL SER: ABNORMAL
COLOR FLD: YELLOW
EOSINOPHIL # FLD MANUAL: 0 %
FLUID INTAKE SUBSTANCE CLASS: NORMAL
LYMPHOCYTES # FLD MANUAL: 19 %
MESOTHL CELL NFR FLD: 0 %
MONOS+MACROS NFR FLD MANUAL: 51 %
NEUTS SEG # FLD MANUAL: 30 %
NRBC # FLD: 0 %
RBC # FLD MANUAL: ABNORMAL /UL
SYCRY CLARITY: ABNORMAL
SYCRY COLOR: YELLOW
SYCRY ID: ABNORMAL
SYCRY TUBE: NORMAL
TOTAL CELLS COUNTED FLD: 870 /UL
TUBE TYPE: NORMAL
UNIDENT CELLS NFR FLD MANUAL: 0 %
VARIANT LYMPHS # FLD MANUAL: 0 %

## 2024-06-20 ENCOUNTER — APPOINTMENT (OUTPATIENT)
Dept: SURGERY | Facility: CLINIC | Age: 64
End: 2024-06-20
Payer: MEDICARE

## 2024-06-20 VITALS
HEIGHT: 68 IN | WEIGHT: 242 LBS | BODY MASS INDEX: 36.68 KG/M2 | HEART RATE: 78 BPM | OXYGEN SATURATION: 95 % | DIASTOLIC BLOOD PRESSURE: 74 MMHG | SYSTOLIC BLOOD PRESSURE: 122 MMHG

## 2024-06-20 PROCEDURE — 99215 OFFICE O/P EST HI 40 MIN: CPT

## 2024-06-28 LAB — JOINT CULTURE: NORMAL

## 2024-07-10 ENCOUNTER — OUTPATIENT (OUTPATIENT)
Dept: OUTPATIENT SERVICES | Facility: HOSPITAL | Age: 64
LOS: 1 days | End: 2024-07-10
Payer: MEDICARE

## 2024-07-10 VITALS
HEART RATE: 72 BPM | DIASTOLIC BLOOD PRESSURE: 77 MMHG | OXYGEN SATURATION: 99 % | WEIGHT: 244.93 LBS | SYSTOLIC BLOOD PRESSURE: 132 MMHG | TEMPERATURE: 97 F | HEIGHT: 67 IN | RESPIRATION RATE: 14 BRPM

## 2024-07-10 DIAGNOSIS — Z98.890 OTHER SPECIFIED POSTPROCEDURAL STATES: Chronic | ICD-10-CM

## 2024-07-10 DIAGNOSIS — Z01.818 ENCOUNTER FOR OTHER PREPROCEDURAL EXAMINATION: ICD-10-CM

## 2024-07-10 DIAGNOSIS — Z98.89 UNDEFINED: Chronic | ICD-10-CM

## 2024-07-10 DIAGNOSIS — Z96.651 PRESENCE OF RIGHT ARTIFICIAL KNEE JOINT: Chronic | ICD-10-CM

## 2024-07-10 DIAGNOSIS — Z96.611 PRESENCE OF RIGHT ARTIFICIAL SHOULDER JOINT: Chronic | ICD-10-CM

## 2024-07-10 DIAGNOSIS — K43.9 VENTRAL HERNIA WITHOUT OBSTRUCTION OR GANGRENE: ICD-10-CM

## 2024-07-10 DIAGNOSIS — Z90.49 ACQUIRED ABSENCE OF OTHER SPECIFIED PARTS OF DIGESTIVE TRACT: Chronic | ICD-10-CM

## 2024-07-10 DIAGNOSIS — Z96.612 PRESENCE OF LEFT ARTIFICIAL SHOULDER JOINT: Chronic | ICD-10-CM

## 2024-07-10 DIAGNOSIS — Z98.1 ARTHRODESIS STATUS: Chronic | ICD-10-CM

## 2024-07-10 LAB
ANION GAP SERPL CALC-SCNC: 4 MMOL/L — LOW (ref 5–17)
BUN SERPL-MCNC: 10 MG/DL — SIGNIFICANT CHANGE UP (ref 7–23)
CALCIUM SERPL-MCNC: 9.6 MG/DL — SIGNIFICANT CHANGE UP (ref 8.5–10.1)
CHLORIDE SERPL-SCNC: 102 MMOL/L — SIGNIFICANT CHANGE UP (ref 96–108)
CO2 SERPL-SCNC: 33 MMOL/L — HIGH (ref 22–31)
CREAT SERPL-MCNC: 0.75 MG/DL — SIGNIFICANT CHANGE UP (ref 0.5–1.3)
EGFR: 100 ML/MIN/1.73M2 — SIGNIFICANT CHANGE UP
GLUCOSE SERPL-MCNC: 134 MG/DL — HIGH (ref 70–99)
HCT VFR BLD CALC: 45.1 % — SIGNIFICANT CHANGE UP (ref 39–50)
HGB BLD-MCNC: 14.6 G/DL — SIGNIFICANT CHANGE UP (ref 13–17)
MCHC RBC-ENTMCNC: 31.1 PG — SIGNIFICANT CHANGE UP (ref 27–34)
MCHC RBC-ENTMCNC: 32.4 GM/DL — SIGNIFICANT CHANGE UP (ref 32–36)
MCV RBC AUTO: 96.2 FL — SIGNIFICANT CHANGE UP (ref 80–100)
NRBC # BLD: 0 /100 WBCS — SIGNIFICANT CHANGE UP (ref 0–0)
PLATELET # BLD AUTO: 201 K/UL — SIGNIFICANT CHANGE UP (ref 150–400)
POTASSIUM SERPL-MCNC: 4 MMOL/L — SIGNIFICANT CHANGE UP (ref 3.5–5.3)
POTASSIUM SERPL-SCNC: 4 MMOL/L — SIGNIFICANT CHANGE UP (ref 3.5–5.3)
RBC # BLD: 4.69 M/UL — SIGNIFICANT CHANGE UP (ref 4.2–5.8)
RBC # FLD: 13.4 % — SIGNIFICANT CHANGE UP (ref 10.3–14.5)
SODIUM SERPL-SCNC: 139 MMOL/L — SIGNIFICANT CHANGE UP (ref 135–145)
WBC # BLD: 6.76 K/UL — SIGNIFICANT CHANGE UP (ref 3.8–10.5)
WBC # FLD AUTO: 6.76 K/UL — SIGNIFICANT CHANGE UP (ref 3.8–10.5)

## 2024-07-10 PROCEDURE — 86850 RBC ANTIBODY SCREEN: CPT

## 2024-07-10 PROCEDURE — 85027 COMPLETE CBC AUTOMATED: CPT

## 2024-07-10 PROCEDURE — 36415 COLL VENOUS BLD VENIPUNCTURE: CPT

## 2024-07-10 PROCEDURE — 86900 BLOOD TYPING SEROLOGIC ABO: CPT

## 2024-07-10 PROCEDURE — 80048 BASIC METABOLIC PNL TOTAL CA: CPT

## 2024-07-10 PROCEDURE — G0463: CPT

## 2024-07-10 PROCEDURE — 86901 BLOOD TYPING SEROLOGIC RH(D): CPT

## 2024-07-10 NOTE — H&P PST ADULT - PROBLEM SELECTOR PLAN 1
PST labs; CBC, BMP, Type & Screen (EKG on chart from 2/6/2024). Medical clearance scheduled with PCP on 7/16/2024. Will fax over PST results to PCP for review and clearance. Pt instructed to stop any NSAIDS/Herbal Supplements/Celebrex one week prior to procedure. May take Tylenol if needed for any pain between now and procedure. Morning of procedure he may use his Advair Diskus. he may also use Albuterol if needed. Pre-op instructions as well as pre-op wash instructions given to pt with understanding verbalized. All questions addressed with pt prior to him leaving the PST department today.

## 2024-07-10 NOTE — H&P PST ADULT - HISTORY OF PRESENT ILLNESS
63 year old male PMH Arthritis, Hyperlipidemia, MARY ANN On CPAP, Asthma ( pt is a [facility] Responder - followed by [facility] monitoring Center), BPH, Chronic Sinusitis, GERD, whatley's Esophagus, H/O melanoma Excision Left Thigh, Obesity, Hearing Loss, Squamous Cell Carcinoma LEFT Hand,  COVID-19, H/O Small Bowel obstruction ( 2023); now with Ventral hernia Without Obstruction Or Gangrene; presents today for PST prior to Laparoscopic Repair Recurrent Incisional hernia with Dr Silas Lord on 7/22/2024.     Pt notes he had a bowel obstruction December 2023 and he was admitted for 9 days and he underwent surgery ( colon resection). Pt notes " everything was fine but about a month ago the hernia popped out." Denies any pain to area. Denies any N/V. Denies any changes in bowel or bladder habits. Following consult, exam, CT scan and discussions with Dr Lord regarding treatment options pt is electing for scheduled procedure.

## 2024-07-10 NOTE — H&P PST ADULT - GENERAL COMMENTS
Denies any travel in the last 2 weeks - denies any recent/known exposure to anyone with covid - denies any current covid SX

## 2024-07-10 NOTE — H&P PST ADULT - NSICDXPASTSURGICALHX_GEN_ALL_CORE_FT
PAST SURGICAL HISTORY:  H/O cardiac catheterization 7-8 yr ago---normal    H/O colonoscopy     H/O resection of small bowel     H/O total shoulder replacement, left     H/O total shoulder replacement, right     H/O umbilical hernia repair 2005    S/P inguinal hernia repair right--1988    S/P lumbar fusion L3-L4  ---2015    S/P total knee replacement, right 2018

## 2024-07-10 NOTE — H&P PST ADULT - NSICDXPASTMEDICALHX_GEN_ALL_CORE_FT
PAST MEDICAL HISTORY:  2019 novel coronavirus disease (COVID-19)     Arthritis     Asthma [facility] responder--followed by [facility] monitoring center    BPH (benign prostatic hyperplasia)     Chronic sinusitis     GERD (gastroesophageal reflux disease)     H/O melanoma excision Left thigh    H/O small bowel obstruction     H/O squamous cell carcinoma excision left hand    Hearing loss     History of Thomson's esophagus     Hyperlipidemia     Obese     Sleep apnea CPAP    Ventral hernia without obstruction or gangrene

## 2024-07-10 NOTE — H&P PST ADULT - NSICDXFAMILYHX_GEN_ALL_CORE_FT
FAMILY HISTORY:  Mother  Still living? Yes, Estimated age: 81-90  Family history of hyperlipidemia, Age at diagnosis: Age Unknown

## 2024-07-10 NOTE — H&P PST ADULT - ASSESSMENT
63 year old male PMH Arthritis, Hyperlipidemia, MARY ANN On CPAP, Asthma ( pt is a [facility] Responder - followed by [facility] monitoring Center), BPH, Chronic Sinusitis, GERD, whatley's Esophagus, H/O melanoma Excision Left Thigh, Obesity, Hearing Loss, Squamous Cell Carcinoma LEFT Hand,  COVID-19, H/O Small Bowel obstruction ( 2023); now with Ventral hernia Without Obstruction Or Gangrene; presents today for PST prior to Laparoscopic Repair Recurrent Incisional Hernia with Dr Silas Lord on 7/22/2024.

## 2024-07-18 ENCOUNTER — APPOINTMENT (OUTPATIENT)
Dept: ORTHOPEDIC SURGERY | Facility: CLINIC | Age: 64
End: 2024-07-18
Payer: MEDICARE

## 2024-07-18 VITALS
HEART RATE: 66 BPM | HEIGHT: 68 IN | SYSTOLIC BLOOD PRESSURE: 119 MMHG | DIASTOLIC BLOOD PRESSURE: 78 MMHG | BODY MASS INDEX: 36.68 KG/M2 | WEIGHT: 242 LBS

## 2024-07-18 DIAGNOSIS — Z96.611 PRESENCE OF RIGHT ARTIFICIAL SHOULDER JOINT: ICD-10-CM

## 2024-07-18 DIAGNOSIS — Z96.612 PRESENCE OF LEFT ARTIFICIAL SHOULDER JOINT: ICD-10-CM

## 2024-07-18 PROCEDURE — 99213 OFFICE O/P EST LOW 20 MIN: CPT

## 2024-07-21 ENCOUNTER — TRANSCRIPTION ENCOUNTER (OUTPATIENT)
Age: 64
End: 2024-07-21

## 2024-07-22 ENCOUNTER — APPOINTMENT (OUTPATIENT)
Dept: SURGERY | Facility: HOSPITAL | Age: 64
End: 2024-07-22

## 2024-07-22 ENCOUNTER — TRANSCRIPTION ENCOUNTER (OUTPATIENT)
Age: 64
End: 2024-07-22

## 2024-07-23 PROBLEM — K43.9 VENTRAL HERNIA WITHOUT OBSTRUCTION OR GANGRENE: Chronic | Status: ACTIVE | Noted: 2024-07-10

## 2024-07-23 PROBLEM — U07.1 COVID-19: Chronic | Status: ACTIVE | Noted: 2024-07-10

## 2024-08-06 ENCOUNTER — APPOINTMENT (OUTPATIENT)
Dept: SURGERY | Facility: CLINIC | Age: 64
End: 2024-08-06

## 2024-08-06 PROCEDURE — 99212 OFFICE O/P EST SF 10 MIN: CPT

## 2024-08-12 ENCOUNTER — RX RENEWAL (OUTPATIENT)
Age: 64
End: 2024-08-12

## 2024-08-29 ENCOUNTER — APPOINTMENT (OUTPATIENT)
Dept: CARDIOLOGY | Facility: CLINIC | Age: 64
End: 2024-08-29
Payer: MEDICARE

## 2024-08-29 ENCOUNTER — NON-APPOINTMENT (OUTPATIENT)
Age: 64
End: 2024-08-29

## 2024-08-29 VITALS
OXYGEN SATURATION: 97 % | BODY MASS INDEX: 36.49 KG/M2 | WEIGHT: 240 LBS | HEART RATE: 71 BPM | DIASTOLIC BLOOD PRESSURE: 66 MMHG | SYSTOLIC BLOOD PRESSURE: 118 MMHG

## 2024-08-29 DIAGNOSIS — E78.5 HYPERLIPIDEMIA, UNSPECIFIED: ICD-10-CM

## 2024-08-29 PROCEDURE — 99213 OFFICE O/P EST LOW 20 MIN: CPT | Mod: 25

## 2024-08-29 PROCEDURE — 93000 ELECTROCARDIOGRAM COMPLETE: CPT

## 2024-08-29 RX ORDER — ATORVASTATIN CALCIUM 20 MG/1
20 TABLET, FILM COATED ORAL
Refills: 0 | Status: ACTIVE | COMMUNITY
Start: 2024-08-29

## 2024-08-29 NOTE — HISTORY OF PRESENT ILLNESS
[FreeTextEntry1] : Pt is a 64 y/o M PMH HLD, BMI 37.  He is feeling well overall - denies CP, SOB, diaphoresis, palpitations, dizziness, syncope, LE edema, PND, orthopnea.  Pt remains active - can walk 2-3 miles, can climb 2-3 flights of stairs  cardiac cath 2013 patent coronary arteries (cardiac cath was done due to abnormal nuc stress test)   PCP Dr Sher Louise Family hx: mother HLD Smoking status: never social ETOH no drug use Daily water intake: "a lot" Daily caffeine intake: 1 cup coffee OTC medications: none Previous hospitalizations/surgeries: umbilical hernia repair, SBO, colon resection, R shoulder, spinal surgery, R knee surgery, shoulder surgery, ventral hernia repair 08/2024 - no problems with anesthesia

## 2024-08-29 NOTE — DISCUSSION/SUMMARY
[EKG obtained to assist in diagnosis and management of assessed problem(s)] : EKG obtained to assist in diagnosis and management of assessed problem(s) [FreeTextEntry1] : Pt is a 64 y/o M PMH HLD, BMI 37.   HLD: c/w statin will get copy of recent labs from PCP Advised lifestyle modifications   Pt will return in 12 mnths or sooner as needed but I encouraged communication via phone or portal if necessary.  Most recent available lab results were reviewed with pt. The described plan was discussed with the pt.  All questions and concerns were addressed to the best of my knowledge.

## 2024-09-04 NOTE — H&P PST ADULT - TEMPERATURE IN FAHRENHEIT (DEGREES F)
Code: Full code  DVT: Heparin gtt  Diet: DASH low fat  Dispo: pending medical optimization      Case Discussed with Dr. Toney    ************************  Hank Cedillo MD  PGY-1 Medicine  ************************ 97.8 Code: Full code  Diet: DASH low fat  Dispo: Home    Case Discussed with Dr. Toney    ************************  Hank Cedillo MD  PGY-1 Medicine  ************************

## 2024-09-09 ENCOUNTER — RX RENEWAL (OUTPATIENT)
Age: 64
End: 2024-09-09

## 2024-10-01 ENCOUNTER — APPOINTMENT (OUTPATIENT)
Dept: ORTHOPEDIC SURGERY | Facility: CLINIC | Age: 64
End: 2024-10-01
Payer: MEDICARE

## 2024-10-01 VITALS
DIASTOLIC BLOOD PRESSURE: 87 MMHG | WEIGHT: 240 LBS | HEIGHT: 68 IN | SYSTOLIC BLOOD PRESSURE: 151 MMHG | BODY MASS INDEX: 36.37 KG/M2 | HEART RATE: 84 BPM

## 2024-10-01 DIAGNOSIS — T84.032A MECHANICAL LOOSENING OF INTERNAL RIGHT KNEE PROSTHETIC JOINT, INITIAL ENCOUNTER: ICD-10-CM

## 2024-10-01 PROCEDURE — 99215 OFFICE O/P EST HI 40 MIN: CPT

## 2024-10-01 PROCEDURE — G2211 COMPLEX E/M VISIT ADD ON: CPT

## 2024-10-01 PROCEDURE — 73562 X-RAY EXAM OF KNEE 3: CPT | Mod: RT

## 2024-10-01 NOTE — DISCUSSION/SUMMARY
[de-identified] : Right total knee arthroplasty with osteolysis and recalled polyethylene liner  Plan: Revision right total Knee Arthroplasty  The potential biologic and mechanical risks of the procedure were discussed. This discussion included but was not limited to thromboembolic disease, fracture, loss of fixation, infection, leg length discrepancy, dislocation and neurovascular injury. The patient is also understands that anesthesia and their comorbidities present additional risks. Proper expectations were addressed as this surgery may only partially or even fail to relieve their pain. The patient understands that additional surgery may be needed during the perioperative period or in the future. We discussed the durability of prosthetic knees and limitations related to wear, osteolysis and loosening. All questions were answered to the patient's satisfaction. The patient was given my packet of additional information about the procedure.  Expect 1-2 day stay in hospital as this is less than standard across the country.  Although outpatient surgery may be feasible in carefully selected heathy patients - the definition of a "healthy" patient to do this in has not been properly studied in randomized trials.  This hospital time is important to observe for urinary retention, neurologic decline, cardiopulmonary issues, and signs of blood clot which are frequent early complications of joint replacements.  This time will also be used to work with PT so they are safe to navigate without falling which could lead to fracture and more surgery.   There is no evidence for any of the following contraindications for total joint replacement surgery:  active infection; active systemic bacteremia; active skin infection or open wound at surgical site; neuropathic arthritis; severe, rapidly progressive neurologic disease; severe medical conditions that makes the risks of surgery outweigh the potential benefit.  We discussed revision surgery as a high risk of infection, around 5% among other additional risks.  We discussed there is a high likelihood of fracture while removing the current implant.  Also discussed potential extensor mechanism issues since the polyethylene patella may need to be revised.  I am recommending the ROM tech Portable Connect device as it is medically necessary for the patient's surgical recovery to provide an optimal outcome.    This patient is being managed for a complex chronic issue that requires ongoing medical management. The nature of this condition requires a longitudinal relationship and monitoring over time for appropriate treatment.  Surgery will be scheduled at a convenient time.   DME: ROM Tech Preop cardiology, pulmonology medical clearance.  Postop DVT ppx: Per Caprini Score Abx ppx: Ancef with extended Duricef due to high risk revision. Dressing: Laura Polyethylene: [facility] PS/CPS

## 2024-10-01 NOTE — HISTORY OF PRESENT ILLNESS
[de-identified] : 10/1/24: Patient here for follow-up right knee pain.  States he would like to move forward with the surgery for a knee revision.  Knee is swelling more recently.  Does have difficulty with stairs pain anteriorly.  Stopped taking meloxicam as he believes he may have had a rash from this.  Recently received a letter stating that the polyethylene patella is also recalled.  He was [employer] in the [place] and is in that monitoring program.  6/13/24: Patient here for follow-up right knee pain.  States the knee is swelling more often.  Does have pain medially laterally.  States he did well with his shoulder surgery.  He does however need a hernia surgery in the next couple weeks.  Meloxicam does help with the pain.  Pain is at night.  Is thinking he would like the surgery in the late fall/winter.  He does have a case number from [employer].  2/16/24: Patient presents with right knee pain.  States he had a right knee replacement 2016 by Dr. Valentine at [facility].  He received a letter that he had recalled polyethylene from [employer].  States the swelling in his knee has increased.  He does have some discomfort medially.  Taking ibuprofen for the occasional pain.  Denies allergies, tobacco use, on 81 mg of aspirin due to a left shoulder replacement 9 days ago.  PMH-asthma

## 2024-10-01 NOTE — DISCUSSION/SUMMARY
[de-identified] : Right total knee arthroplasty with osteolysis and recalled polyethylene liner  Plan: Revision right total Knee Arthroplasty  The potential biologic and mechanical risks of the procedure were discussed. This discussion included but was not limited to thromboembolic disease, fracture, loss of fixation, infection, leg length discrepancy, dislocation and neurovascular injury. The patient is also understands that anesthesia and their comorbidities present additional risks. Proper expectations were addressed as this surgery may only partially or even fail to relieve their pain. The patient understands that additional surgery may be needed during the perioperative period or in the future. We discussed the durability of prosthetic knees and limitations related to wear, osteolysis and loosening. All questions were answered to the patient's satisfaction. The patient was given my packet of additional information about the procedure.  Expect 1-2 day stay in hospital as this is less than standard across the country.  Although outpatient surgery may be feasible in carefully selected heathy patients - the definition of a "healthy" patient to do this in has not been properly studied in randomized trials.  This hospital time is important to observe for urinary retention, neurologic decline, cardiopulmonary issues, and signs of blood clot which are frequent early complications of joint replacements.  This time will also be used to work with PT so they are safe to navigate without falling which could lead to fracture and more surgery.   There is no evidence for any of the following contraindications for total joint replacement surgery:  active infection; active systemic bacteremia; active skin infection or open wound at surgical site; neuropathic arthritis; severe, rapidly progressive neurologic disease; severe medical conditions that makes the risks of surgery outweigh the potential benefit.  We discussed revision surgery as a high risk of infection, around 5% among other additional risks.  We discussed there is a high likelihood of fracture while removing the current implant.  Also discussed potential extensor mechanism issues since the polyethylene patella may need to be revised.  I am recommending the ROM tech Portable Connect device as it is medically necessary for the patient's surgical recovery to provide an optimal outcome.    This patient is being managed for a complex chronic issue that requires ongoing medical management. The nature of this condition requires a longitudinal relationship and monitoring over time for appropriate treatment.  Surgery will be scheduled at a convenient time.   DME: ROM Tech Preop cardiology, pulmonology medical clearance.  Postop DVT ppx: Per Caprini Score Abx ppx: Ancef with extended Duricef due to high risk revision. Dressing: Laura Polyethylene: [facility] PS/CPS

## 2024-10-01 NOTE — HISTORY OF PRESENT ILLNESS
[de-identified] : 10/1/24: Patient here for follow-up right knee pain.  States he would like to move forward with the surgery for a knee revision.  Knee is swelling more recently.  Does have difficulty with stairs pain anteriorly.  Stopped taking meloxicam as he believes he may have had a rash from this.  Recently received a letter stating that the polyethylene patella is also recalled.  He was [employer] in the [employer] and is in that monitoring program.  6/13/24: Patient here for follow-up right knee pain.  States the knee is swelling more often.  Does have pain medially laterally.  States he did well with his shoulder surgery.  He does however need a hernia surgery in the next couple weeks.  Meloxicam does help with the pain.  Pain is at night.  Is thinking he would like the surgery in the late fall/winter.  He does have a case number from [employer].  2/16/24: Patient presents with right knee pain.  States he had a right knee replacement 2016 by Dr. Valentine at [facility].  He received a letter that he had recalled polyethylene from [employer].  States the swelling in his knee has increased.  He does have some discomfort medially.  Taking ibuprofen for the occasional pain.  Denies allergies, tobacco use, on 81 mg of aspirin due to a left shoulder replacement 9 days ago.  PMH-asthma

## 2024-10-01 NOTE — PHYSICAL EXAM
[de-identified] : Right knee Well-healed surgical scar Positive effusion Range of motion 0-120 Minimal laxity in coronal plane Moderate AP translation [de-identified] : 10/1/24:3 views right knee-osteolysis on lateral tibial component,, positive effusion 6/13/24:3 views right knee-osteolysis on lateral tibial component, unchanged 2/16/24: 3 views right knee-osteolysis on lateral tibial component

## 2024-10-01 NOTE — PHYSICAL EXAM
[de-identified] : Right knee Well-healed surgical scar Positive effusion Range of motion 0-120 Minimal laxity in coronal plane Moderate AP translation [de-identified] : 10/1/24:3 views right knee-osteolysis on lateral tibial component,, positive effusion 6/13/24:3 views right knee-osteolysis on lateral tibial component, unchanged 2/16/24: 3 views right knee-osteolysis on lateral tibial component

## 2024-10-14 ENCOUNTER — RX RENEWAL (OUTPATIENT)
Age: 64
End: 2024-10-14

## 2024-10-19 ENCOUNTER — NON-APPOINTMENT (OUTPATIENT)
Age: 64
End: 2024-10-19

## 2025-01-02 ENCOUNTER — APPOINTMENT (OUTPATIENT)
Dept: CARDIOLOGY | Facility: CLINIC | Age: 65
End: 2025-01-02
Payer: MEDICARE

## 2025-01-02 ENCOUNTER — NON-APPOINTMENT (OUTPATIENT)
Age: 65
End: 2025-01-02

## 2025-01-02 VITALS
OXYGEN SATURATION: 98 % | DIASTOLIC BLOOD PRESSURE: 78 MMHG | BODY MASS INDEX: 36.37 KG/M2 | WEIGHT: 240 LBS | HEIGHT: 68 IN | HEART RATE: 71 BPM | SYSTOLIC BLOOD PRESSURE: 126 MMHG

## 2025-01-02 DIAGNOSIS — E78.5 HYPERLIPIDEMIA, UNSPECIFIED: ICD-10-CM

## 2025-01-02 PROCEDURE — 99214 OFFICE O/P EST MOD 30 MIN: CPT | Mod: 25

## 2025-01-02 PROCEDURE — 93000 ELECTROCARDIOGRAM COMPLETE: CPT

## 2025-01-08 ENCOUNTER — OUTPATIENT (OUTPATIENT)
Dept: OUTPATIENT SERVICES | Facility: HOSPITAL | Age: 65
LOS: 1 days | End: 2025-01-08
Payer: MEDICARE

## 2025-01-08 VITALS
HEIGHT: 68 IN | SYSTOLIC BLOOD PRESSURE: 114 MMHG | TEMPERATURE: 98 F | DIASTOLIC BLOOD PRESSURE: 80 MMHG | WEIGHT: 240.3 LBS | RESPIRATION RATE: 16 BRPM | OXYGEN SATURATION: 100 % | HEART RATE: 66 BPM

## 2025-01-08 DIAGNOSIS — Z96.611 PRESENCE OF RIGHT ARTIFICIAL SHOULDER JOINT: Chronic | ICD-10-CM

## 2025-01-08 DIAGNOSIS — Z98.890 OTHER SPECIFIED POSTPROCEDURAL STATES: Chronic | ICD-10-CM

## 2025-01-08 DIAGNOSIS — Z01.818 ENCOUNTER FOR OTHER PREPROCEDURAL EXAMINATION: ICD-10-CM

## 2025-01-08 DIAGNOSIS — Z98.89 OTHER SPECIFIED POSTPROCEDURAL STATES: Chronic | ICD-10-CM

## 2025-01-08 DIAGNOSIS — Z98.1 ARTHRODESIS STATUS: Chronic | ICD-10-CM

## 2025-01-08 DIAGNOSIS — M19.90 UNSPECIFIED OSTEOARTHRITIS, UNSPECIFIED SITE: ICD-10-CM

## 2025-01-08 DIAGNOSIS — Z90.49 ACQUIRED ABSENCE OF OTHER SPECIFIED PARTS OF DIGESTIVE TRACT: Chronic | ICD-10-CM

## 2025-01-08 DIAGNOSIS — Z96.651 PRESENCE OF RIGHT ARTIFICIAL KNEE JOINT: Chronic | ICD-10-CM

## 2025-01-08 DIAGNOSIS — Z96.612 PRESENCE OF LEFT ARTIFICIAL SHOULDER JOINT: Chronic | ICD-10-CM

## 2025-01-08 LAB
A1C WITH ESTIMATED AVERAGE GLUCOSE RESULT: 5.5 % — SIGNIFICANT CHANGE UP (ref 4–5.6)
ALBUMIN SERPL ELPH-MCNC: 3.7 G/DL — SIGNIFICANT CHANGE UP (ref 3.3–5)
ALP SERPL-CCNC: 51 U/L — SIGNIFICANT CHANGE UP (ref 40–120)
ALT FLD-CCNC: 24 U/L — SIGNIFICANT CHANGE UP (ref 12–78)
ANION GAP SERPL CALC-SCNC: 3 MMOL/L — LOW (ref 5–17)
APTT BLD: 34.4 SEC — SIGNIFICANT CHANGE UP (ref 24.5–35.6)
AST SERPL-CCNC: 19 U/L — SIGNIFICANT CHANGE UP (ref 15–37)
BASOPHILS # BLD AUTO: 0.03 K/UL — SIGNIFICANT CHANGE UP (ref 0–0.2)
BASOPHILS NFR BLD AUTO: 0.5 % — SIGNIFICANT CHANGE UP (ref 0–2)
BILIRUB SERPL-MCNC: 0.9 MG/DL — SIGNIFICANT CHANGE UP (ref 0.2–1.2)
BUN SERPL-MCNC: 14 MG/DL — SIGNIFICANT CHANGE UP (ref 7–23)
CALCIUM SERPL-MCNC: 9.5 MG/DL — SIGNIFICANT CHANGE UP (ref 8.5–10.1)
CHLORIDE SERPL-SCNC: 105 MMOL/L — SIGNIFICANT CHANGE UP (ref 96–108)
CO2 SERPL-SCNC: 31 MMOL/L — SIGNIFICANT CHANGE UP (ref 22–31)
CREAT SERPL-MCNC: 0.82 MG/DL — SIGNIFICANT CHANGE UP (ref 0.5–1.3)
EGFR: 97 ML/MIN/1.73M2 — SIGNIFICANT CHANGE UP
EOSINOPHIL # BLD AUTO: 0.09 K/UL — SIGNIFICANT CHANGE UP (ref 0–0.5)
EOSINOPHIL NFR BLD AUTO: 1.4 % — SIGNIFICANT CHANGE UP (ref 0–6)
ESTIMATED AVERAGE GLUCOSE: 111 MG/DL — SIGNIFICANT CHANGE UP (ref 68–114)
GLUCOSE SERPL-MCNC: 118 MG/DL — HIGH (ref 70–99)
HCT VFR BLD CALC: 45.5 % — SIGNIFICANT CHANGE UP (ref 39–50)
HGB BLD-MCNC: 14.8 G/DL — SIGNIFICANT CHANGE UP (ref 13–17)
IMM GRANULOCYTES NFR BLD AUTO: 0.8 % — SIGNIFICANT CHANGE UP (ref 0–0.9)
INR BLD: 0.91 RATIO — SIGNIFICANT CHANGE UP (ref 0.85–1.16)
LYMPHOCYTES # BLD AUTO: 1.76 K/UL — SIGNIFICANT CHANGE UP (ref 1–3.3)
LYMPHOCYTES # BLD AUTO: 26.4 % — SIGNIFICANT CHANGE UP (ref 13–44)
MCHC RBC-ENTMCNC: 31.2 PG — SIGNIFICANT CHANGE UP (ref 27–34)
MCHC RBC-ENTMCNC: 32.5 G/DL — SIGNIFICANT CHANGE UP (ref 32–36)
MCV RBC AUTO: 95.8 FL — SIGNIFICANT CHANGE UP (ref 80–100)
MONOCYTES # BLD AUTO: 0.53 K/UL — SIGNIFICANT CHANGE UP (ref 0–0.9)
MONOCYTES NFR BLD AUTO: 8 % — SIGNIFICANT CHANGE UP (ref 2–14)
MRSA PCR RESULT.: SIGNIFICANT CHANGE UP
NEUTROPHILS # BLD AUTO: 4.2 K/UL — SIGNIFICANT CHANGE UP (ref 1.8–7.4)
NEUTROPHILS NFR BLD AUTO: 62.9 % — SIGNIFICANT CHANGE UP (ref 43–77)
PLATELET # BLD AUTO: 218 K/UL — SIGNIFICANT CHANGE UP (ref 150–400)
POTASSIUM SERPL-MCNC: 4.6 MMOL/L — SIGNIFICANT CHANGE UP (ref 3.5–5.3)
POTASSIUM SERPL-SCNC: 4.6 MMOL/L — SIGNIFICANT CHANGE UP (ref 3.5–5.3)
PROT SERPL-MCNC: 7.1 GM/DL — SIGNIFICANT CHANGE UP (ref 6–8.3)
PROTHROM AB SERPL-ACNC: 10.7 SEC — SIGNIFICANT CHANGE UP (ref 9.9–13.4)
RBC # BLD: 4.75 M/UL — SIGNIFICANT CHANGE UP (ref 4.2–5.8)
RBC # FLD: 12.8 % — SIGNIFICANT CHANGE UP (ref 10.3–14.5)
S AUREUS DNA NOSE QL NAA+PROBE: DETECTED
SODIUM SERPL-SCNC: 139 MMOL/L — SIGNIFICANT CHANGE UP (ref 135–145)
WBC # BLD: 6.66 K/UL — SIGNIFICANT CHANGE UP (ref 3.8–10.5)
WBC # FLD AUTO: 6.66 K/UL — SIGNIFICANT CHANGE UP (ref 3.8–10.5)

## 2025-01-08 PROCEDURE — 80053 COMPREHEN METABOLIC PANEL: CPT

## 2025-01-08 PROCEDURE — 87640 STAPH A DNA AMP PROBE: CPT

## 2025-01-08 PROCEDURE — 83036 HEMOGLOBIN GLYCOSYLATED A1C: CPT

## 2025-01-08 PROCEDURE — 36415 COLL VENOUS BLD VENIPUNCTURE: CPT

## 2025-01-08 PROCEDURE — 85025 COMPLETE CBC W/AUTO DIFF WBC: CPT

## 2025-01-08 PROCEDURE — 86900 BLOOD TYPING SEROLOGIC ABO: CPT

## 2025-01-08 PROCEDURE — 86850 RBC ANTIBODY SCREEN: CPT

## 2025-01-08 PROCEDURE — 85730 THROMBOPLASTIN TIME PARTIAL: CPT

## 2025-01-08 PROCEDURE — 86901 BLOOD TYPING SEROLOGIC RH(D): CPT

## 2025-01-08 PROCEDURE — 99214 OFFICE O/P EST MOD 30 MIN: CPT | Mod: 25

## 2025-01-08 PROCEDURE — 87641 MR-STAPH DNA AMP PROBE: CPT

## 2025-01-08 PROCEDURE — 85610 PROTHROMBIN TIME: CPT

## 2025-01-08 NOTE — H&P PST ADULT - HEMATOLOGY/LYMPHATICS COMMENTS
[facility] MEDICINE SERVICE  DAILY PROGRESS NOTE    NAME: Maria Elena Byrnes  : 1980  MRN: [id number]      LOS: 0 days     PROVIDER OF SERVICE: MARC Virk    Chief Complaint: Cirrhosis of liver with ascites    Subjective:     Interval History:  History taken from: patient chart  Patient Complaints: Abdominal pain, sore throat  Patient Denies: Nausea, vomiting    Review of Systems:   Review of Systems   Constitutional:  Positive for appetite change. Negative for activity change, chills and diaphoresis.   HENT: Negative.     Eyes: Negative.    Respiratory: Negative.     Cardiovascular: Negative.    Gastrointestinal:  Positive for abdominal distention and abdominal pain. Negative for anal bleeding, blood in stool, nausea, rectal pain and vomiting.   Endocrine: Negative.    Genitourinary: Negative.    Musculoskeletal: Negative.    Skin: Negative.    Allergic/Immunologic: Negative.    Neurological: Negative.    Hematological: Negative.    Psychiatric/Behavioral: Negative.         Objective:     Vital Signs  Temp:  [97.6 °F (36.4 °C)-97.9 °F (36.6 °C)] 97.9 °F (36.6 °C)  Heart Rate:  [63-79] 65  Resp:  [16-18] 16  BP: (90-98)/(57-61) 98/61   Body mass index is 32.25 kg/m².    Physical Exam  Physical Exam  Physical Exam  Vitals and nursing note reviewed.   Constitutional:       Appearance: Normal appearance. She is obese. She is ill-appearing.   HENT:      Mouth/Throat:      Mouth: Mucous membranes are moist.   Cardiovascular:      Rate and Rhythm: Normal rate and regular rhythm.   Pulmonary:      Effort: Pulmonary effort is normal.      Breath sounds: Normal breath sounds.   Abdominal:      General: Abdomen is protuberant. Bowel sounds are normal. There is distension.      Palpations: Abdomen is soft.      Tenderness: There is generalized abdominal tenderness.  Tenderness with movement  Musculoskeletal:         General: Normal range of motion.   Skin:     General: Skin is warm and dry.    Neurological:      General: No focal deficit present.      Mental Status: She is alert and oriented to person, place, and time. Mental status is at baseline.   Psychiatric:         Mood and Affect: Mood normal.         Behavior: Behavior normal.   Scheduled Meds   albumin human, 37.5 g, Intravenous, Once   Or  albumin human, 50 g, Intravenous, Once   Or  albumin human, 62.5 g, Intravenous, Once   Or  albumin human, 75 g, Intravenous, Once   Or  albumin human, 87.5 g, Intravenous, Once   Or  albumin human, 100 g, Intravenous, Once   Or  albumin human, 112.5 g, Intravenous, Once  [START ON 8/10/2024] cefTRIAXone, 2,000 mg, Intravenous, Daily  folic acid, 1 mg, Oral, Daily  furosemide, 40 mg, Intravenous, BID  lactulose, 30 g, Oral, TID  LORazepam, 2 mg, Oral, Q6H   Followed by  LORazepam, 1 mg, Oral, Q6H   Followed by  [START ON 8/10/2024] LORazepam, 1 mg, Oral, Q12H   Followed by  [START ON 8/12/2024] LORazepam, 1 mg, Oral, Daily  midodrine, 10 mg, Oral, Q8H  rifAXIMin, 550 mg, Oral, Q12H  sodium chloride, 10 mL, Intravenous, Q12H  thiamine (B-1) IV, 200 mg, Intravenous, Q8H   Followed by  [START ON 8/14/2024] thiamine, 100 mg, Oral, Daily  zinc sulfate, 220 mg, Oral, Daily       PRN Meds     senna-docusate sodium **AND** polyethylene glycol **AND** bisacodyl **AND** bisacodyl    Calcium Replacement - Follow Nurse / BPA Driven Protocol    HYDROmorphone    LORazepam **OR** LORazepam **OR** LORazepam **OR** LORazepam **OR** LORazepam **OR** LORazepam    Magnesium Standard Dose Replacement - Follow Nurse / BPA Driven Protocol    melatonin    ondansetron    Phosphorus Replacement - Follow Nurse / BPA Driven Protocol    Potassium Replacement - Follow Nurse / BPA Driven Protocol    sodium chloride    sodium chloride    sodium chloride    traMADol   Infusions         Diagnostic Data    Results from last 7 days   Lab Units 08/09/24  0546 08/08/24  2329   WBC 10*3/mm3 3.21* 4.43   HEMOGLOBIN g/dL 8.2* 9.2*   HEMATOCRIT %  26.4* 28.3*   PLATELETS 10*3/mm3 63* 74*   GLUCOSE mg/dL 97 97   CREATININE mg/dL 0.90 0.86   BUN mg/dL 12 9   SODIUM mmol/L 137 137   POTASSIUM mmol/L 3.9 4.2   AST (SGOT) U/L  --  121*   ALT (SGPT) U/L  --  61*   ALK PHOS U/L  --  166*   BILIRUBIN mg/dL  --  3.9*   ANION GAP mmol/L 7.3 9.6       CT Abdomen Pelvis With Contrast    Result Date: 8/9/2024  Impression: 1.Small to moderate amount of ascites present throughout the abdomen and pelvis with anasarca of the soft tissues likely related to volume overload or hepatic failure. 2.Surface nodularity of the liver consistent with cirrhosis. Portal vein is patent. No focal lesions. 3.Diffuse gallbladder wall thickening likely related to underlying liver disease. Acute cholecystitis cannot be excluded though less likely. No evidence of stones or biliary ductal dilatation. 4.Ancillary findings as described above. Electronically Signed: Martine Wright MD  8/9/2024 1:28 AM EDT  Workstation ID: [id number]       I reviewed the patient's new clinical results.    Assessment/Plan:     Active and Resolved Problems  Active Hospital Problems    Diagnosis  POA    **Cirrhosis of liver with ascites [K74.60, R18.8]  Yes      Resolved Hospital Problems   No resolved problems to display.       Abdominal pain  Known cirrhosis of the liver due to alcohol abuse  - Ammonia 125  - Alk phos, albumin, AST, ALT, total bili pending for 8/9/2024  - PT 15.9/INR 1.5  - WBC 3.21  - H&H 8.2/26.4 respectively  - Platelets 63  - Hep C antibody reactive  - CT of abdomen and pelvis showed ascites throughout the abdomen with anasarca of the soft tissues related to hepatic failure.  Cirrhotic changes of the liver and diffuse gallbladder wall thickening likely related to underlying liver disease.  - Dilaudid 1 mg every 3 hours as needed pain ordered  - Ultrasound of liver pending  - GI consulted    Alcohol abuse  - Patient currently resident at [facility] alcohol rehab facility  - Patient states she still  continues to drink  - [facility] protocol initiated  - Case management consulted    I seen and evaluated this patient this morning.  Patient states that she has been having increased urination due to Lasix given while in the emergency department.  Patient's complaining of throat pain for 1 month since colonoscopy completed, patient also with complaints of abdominal pain.  Chloraseptic Spray, as well as Dilaudid every 3 as needed ordered.  Plan is for patient to go for therapeutic paracentesis today.  Will reevaluate patient, as well as labs in the morning.    VTE Prophylaxis:  Mechanical VTE prophylaxis orders are present.         Code status is   Code Status and Medical Interventions: CPR (Attempt to Resuscitate); Full Support   Ordered at: 08/09/24 0239     Code Status (Patient has no pulse and is not breathing):    CPR (Attempt to Resuscitate)     Medical Interventions (Patient has pulse or is breathing):    Full Support       Plan for disposition: [facility] rehab facility 8/11/2024    Time: 30 minutes    Signature: Electronically signed by MARC Virk, 08/09/24, 10:58 EDT.  [facility] Hospitalist Team      Addendum:    I saw and examined the patient in addition to the ELLIOTT.  I agree with assessment and plan with the following addendum:    General Appearance:  Alert, cooperative, no distress, appears stated age  Head:  Normocephalic, without obvious abnormality, atraumatic  Eyes:  PERRL, conjunctiva/corneas clear, EOM's intact, fundi benign, both eyes  Ears:  Normal TM's and external ear canals, both ears  Nose: Nares normal, septum midline, mucosa normal, no drainage or sinus tenderness  Throat: Lips, mucosa, and tongue normal; teeth and gums normal  Neck: Supple, symmetrical, trachea midline, no adenopathy, thyroid: not enlarged, symmetric, no tenderness/mass/nodules, no carotid bruit or JVD  Lungs:   Clear to auscultation bilaterally, respirations unlabored  Heart: Regular rate and rhythm, S1, S2 normal,  no murmur, rub or gallop  Abdomen:  Soft, non-tender, bowel sounds active all four quadrants,  no masses, no organomegaly  Extremities: Extremities normal, atraumatic, no cyanosis or edema  Pulses: 2+ and symmetric  Skin: Skin color, texture, turgor normal, no rashes or lesions  Neurologic: Normal      Patient underwent paracentesis today with about 1 L of fluid removed.  This was likely the cause of her abdominal pain.  However she would likely have recurrence of this fluid.  I will start her on IV Lasix and transition her to oral Lasix on discharge.  She would also benefit from low-dose spironolactone on discharge.  Continue pain control.      Shay Thornton MD  [facility] Hospitalist Team  08/09/24  14:14 EDT     Denies personal or family hx of DVT or PE

## 2025-01-08 NOTE — H&P PST ADULT - PROBLEM SELECTOR PLAN 1
severe Pre op, mupirocin and chlorhexidine instructions given and explained.  joint educational booklet provided   Avoid NSAIDs and OTC supplements.   Patient verbalized understanding  medical optimization requested by surgeon  patient will require a rolling walker at home for total joint replacement surgery due to osteoarthritis of the  knee to help complete the MRADL's  cbc, bmp ptt/inr, a1c, mrsa, albumin,  done today in PST   Cardiac optimization scheduled pending reports Pre op, mupirocin and chlorhexidine instructions given and explained.  joint educational booklet provided   Avoid NSAIDs and OTC supplements.   Patient verbalized understanding  medical optimization requested by surgeon  patient will require a rolling walker at home for total joint replacement surgery due to osteoarthritis of the  knee to help complete the MRADL's  cbc, bmp ptt/inr, a1c, mrsa, albumin, T&S  done today in PST   Cardiac optimization scheduled pending reports

## 2025-01-08 NOTE — H&P PST ADULT - NSICDXPASTMEDICALHX_GEN_ALL_CORE_FT
PAST MEDICAL HISTORY:  2019 novel coronavirus disease (COVID-19)     Arthritis     Asthma WT responder--followed by [facility] monitoring center    BPH (benign prostatic hyperplasia)     Chronic sinusitis     GERD (gastroesophageal reflux disease)     H/O melanoma excision Left thigh    H/O small bowel obstruction     H/O squamous cell carcinoma excision left hand    Hearing loss     History of Thomson's esophagus     Hyperlipidemia     Obese     Sleep apnea CPAP    Ventral hernia without obstruction or gangrene

## 2025-01-08 NOTE — H&P PST ADULT - HISTORY OF PRESENT ILLNESS
65 y/o  male presents to PST for scheduled right knee replacement revision on 1/22/25. Patient with right knee replacement done 2016, received recall letter on device advise to replace.

## 2025-01-08 NOTE — H&P PST ADULT - ASSESSMENT
63 y/o  male presents to PST for scheduled right knee replacement revision on 25.     CAPRINI SCORE Version 2013    AGE RELATED RISK FACTORS                                                             [ ] Age 41-60 years             [1 point]  [x ] Age: 61-74 years            [2 points]                 [ ] Age 75 years or over     [3 points]             DISEASE RELATED RISK FACTORS                                                       [ ] Current swollen legs (pitting edema of any level)     [1 point]                     [ ] Varicose veins (visible, bulging vein, not spider veins or surgically removed veins)   [1 point]                                  x] BMI > 25 Kg/m2                       [1 point]  [x ] BMI > 40 kg/m2                       [1 point]                                 [ ] Serious infection (within past month, requiring hospitalization and IV antibiotics)    [1 point]                     [ ] Lung disease ( interstitial: COPD, emphysema, sarcoid, 9-11 illness, NOT asthma)       [1 point]                                                                          [ ] Acute myocardial infarction (within past month)      [1 point]                  [ ] Congestive heart failure (episode within past month or taking CHF meds)                   [1 point]         [ ] Inflammatory bowel disease (Crohns disease or ulcerative colitis, not irritable bowel syndrome)   [1 point]                  [ ] Central venous access, PICC line or Port (within past month)     [2 points]                                                             [ ] Stroke (within past month)     [5 points]    [ ] Previous or present malignancy (includes melanoma but not basal cell carcinoma, each incidence of cancer scores 2 points-not metastases)  [2 points]                                                                                                                                                         HEMATOLOGY RELATED FACTORS                                                         [ ] History of DVT or PE (including superficial venous thrombosis)    [3 points]                    [ ] Positive family history for DVT or PE (includes first-, second-, and third-degree relatives)   [3 points]   [ ] Personal or family history of genetic thrombophilia (family history counts only if it has not been confirmed that patient does not have this genetic marker)                       [ ] Prothrombin 03714M mutation                   [3 points]                           [ ] Factor V Leiden                                               [3 points]            [ ] Antithrombin III deficiency                           [3 points]         [ ] Protein C & S deficiency                                [3 points]              [ ] Dysfibrinogenemia                                         [3 points]  [ ] Personal history of acquired thrombophilia                       [ ] Lupus anticoagulant                                       [3 points]                                                                  [ ] Anticardiolipin antibodies                             [3 points]              [ ] Antiphospholipid antibodies                         [3 points]                                                         [ ] High homocysteine in the blood                  [3 points]                                                    [ ] Myeloproliferative disorders (including thrombocytosis)    [3 points]            [ ] HIV                                                                     [3 points]                                                    [ ] Heparin induced thrombocytopenia            [3 points]                                        MOBILITY RELATED FACTORS  [ ] Bed rest or restricted mobility (inability to ambulate 30 feet continuously or removable leg brace) for less than 72 hours    [1 point]  [ ] Nonremovable plaster cast or mold that prevents calf muscle use   [2 points]  [ ] Bed bound  or restricted mobility for more than 72 hours                  [2 points]    GENDER SPECIFIC FACTORS  [ ] Pregnancy or had a baby within the last month   [1 point]  [ ] Hormone therapy  or oral contraception               [1 point]  [ ] Current use of estrogen-like drugs (raloxifine, tamoxifen, anastrozole, letrozole)                                [1 point]  [ ] History of unexplained stillborn infant, premature birth with toxemia or growth-restricted infant   [1 point]  [ ] Recurrent spontaneous abortions (3 or more)      [1 point]    OTHER RISK FACTORS                                         [ ] Current smoker (includes vaping and smoking marijuana)      [1 point]  [ ] Diabetes requiring insulin     [1 point]                   [ ] Chemotherapy (includes methotrexate for rheumatoid arthritis, hydroxyurea for thrombocytosis)    [1 point]  [ ] Blood transfusion(s)               [1 point]    SURGERY RELATED RISK FACTORS  [ ]  section within the last month                    [1 point]  [ ] Minor surgery is planned (less than 45 minutes)     [1 point]  [ ] Past major surgery (longer than 45 minutes) within past month  [2 points]  [ ] Planned major surgery lasting more than 45 minutes (includes laparoscopic and arthroscopic; do not add to the "5" for hip and knee replacement)    [2 points]  [ ] Length of surgery over 2 hours (includes anesthesia time; do not add to the "5" for hip and knee replacement)   [1 point]  [ x] Elective hip or knee joint replacement surgery         [5 points]                                               TRAUMA RELATED RISK FACTORS  [ ] Fracture of the hip, pelvis, or leg                       [5 points]  [ ] Spinal cord injury resulting in paralysis (within the past month)    [5 points]  [ ] Paralysis  (within the past month)                      [5 points]  [ ] Multiple trauma (within the past month)         [5 Points]    Total Score [    9    ]    total knee replacement:  Caprini score 9 or less: LOW risk       65 y/o  male presents to PST for scheduled right knee replacement revision on 25.     CAPRINI SCORE Version 2013    AGE RELATED RISK FACTORS                                                             [ ] Age 41-60 years             [1 point]  [x ] Age: 61-74 years            [2 points]                 [ ] Age 75 years or over     [3 points]             DISEASE RELATED RISK FACTORS                                                       [ ] Current swollen legs (pitting edema of any level)     [1 point]                     [ ] Varicose veins (visible, bulging vein, not spider veins or surgically removed veins)   [1 point]                                  x] BMI > 25 Kg/m2                       [1 point]  [ ] BMI > 40 kg/m2                       [1 point]                                 [ ] Serious infection (within past month, requiring hospitalization and IV antibiotics)    [1 point]                     [ ] Lung disease ( interstitial: COPD, emphysema, sarcoid, 9-11 illness, NOT asthma)       [1 point]                                                                          [ ] Acute myocardial infarction (within past month)      [1 point]                  [ ] Congestive heart failure (episode within past month or taking CHF meds)                   [1 point]         [ ] Inflammatory bowel disease (Crohns disease or ulcerative colitis, not irritable bowel syndrome)   [1 point]                  [ ] Central venous access, PICC line or Port (within past month)     [2 points]                                                             [ ] Stroke (within past month)     [5 points]    [ x] Previous or present malignancy (includes melanoma but not basal cell carcinoma, each incidence of cancer scores 2 points-not metastases)  [2 points]                                                                                                                                                         HEMATOLOGY RELATED FACTORS                                                         [ ] History of DVT or PE (including superficial venous thrombosis)    [3 points]                    [ ] Positive family history for DVT or PE (includes first-, second-, and third-degree relatives)   [3 points]   [ ] Personal or family history of genetic thrombophilia (family history counts only if it has not been confirmed that patient does not have this genetic marker)                       [ ] Prothrombin [id number] mutation                   [3 points]                           [ ] Factor V Leiden                                               [3 points]            [ ] Antithrombin III deficiency                           [3 points]         [ ] Protein C & S deficiency                                [3 points]              [ ] Dysfibrinogenemia                                         [3 points]  [ ] Personal history of acquired thrombophilia                       [ ] Lupus anticoagulant                                       [3 points]                                                                  [ ] Anticardiolipin antibodies                             [3 points]              [ ] Antiphospholipid antibodies                         [3 points]                                                         [ ] High homocysteine in the blood                  [3 points]                                                    [ ] Myeloproliferative disorders (including thrombocytosis)    [3 points]            [ ] HIV                                                                     [3 points]                                                    [ ] Heparin induced thrombocytopenia            [3 points]                                        MOBILITY RELATED FACTORS  [ ] Bed rest or restricted mobility (inability to ambulate 30 feet continuously or removable leg brace) for less than 72 hours    [1 point]  [ ] Nonremovable plaster cast or mold that prevents calf muscle use   [2 points]  [ ] Bed bound  or restricted mobility for more than 72 hours                  [2 points]    GENDER SPECIFIC FACTORS  [ ] Pregnancy or had a baby within the last month   [1 point]  [ ] Hormone therapy  or oral contraception               [1 point]  [ ] Current use of estrogen-like drugs (raloxifine, tamoxifen, anastrozole, letrozole)                                [1 point]  [ ] History of unexplained stillborn infant, premature birth with toxemia or growth-restricted infant   [1 point]  [ ] Recurrent spontaneous abortions (3 or more)      [1 point]    OTHER RISK FACTORS                                         [ ] Current smoker (includes vaping and smoking marijuana)      [1 point]  [ ] Diabetes requiring insulin     [1 point]                   [ ] Chemotherapy (includes methotrexate for rheumatoid arthritis, hydroxyurea for thrombocytosis)    [1 point]  [ ] Blood transfusion(s)               [1 point]    SURGERY RELATED RISK FACTORS  [ ]  section within the last month                    [1 point]  [ ] Minor surgery is planned (less than 45 minutes)     [1 point]  [ ] Past major surgery (longer than 45 minutes) within past month  [2 points]  [ ] Planned major surgery lasting more than 45 minutes (includes laparoscopic and arthroscopic; do not add to the "5" for hip and knee replacement)    [2 points]  [ ] Length of surgery over 2 hours (includes anesthesia time; do not add to the "5" for hip and knee replacement)   [1 point]  [ x] Elective hip or knee joint replacement surgery         [5 points]                                               TRAUMA RELATED RISK FACTORS  [ ] Fracture of the hip, pelvis, or leg                       [5 points]  [ ] Spinal cord injury resulting in paralysis (within the past month)    [5 points]  [ ] Paralysis  (within the past month)                      [5 points]  [ ] Multiple trauma (within the past month)         [5 Points]    Total Score [    10   ]    total knee replacement:  Caprini score 9 or less: LOW risk

## 2025-01-09 ENCOUNTER — APPOINTMENT (OUTPATIENT)
Dept: CT IMAGING | Facility: CLINIC | Age: 65
End: 2025-01-09

## 2025-01-09 ENCOUNTER — OUTPATIENT (OUTPATIENT)
Dept: OUTPATIENT SERVICES | Facility: HOSPITAL | Age: 65
LOS: 1 days | End: 2025-01-09
Payer: MEDICARE

## 2025-01-09 DIAGNOSIS — Z98.1 ARTHRODESIS STATUS: Chronic | ICD-10-CM

## 2025-01-09 DIAGNOSIS — Z96.612 PRESENCE OF LEFT ARTIFICIAL SHOULDER JOINT: Chronic | ICD-10-CM

## 2025-01-09 DIAGNOSIS — Z98.890 OTHER SPECIFIED POSTPROCEDURAL STATES: Chronic | ICD-10-CM

## 2025-01-09 DIAGNOSIS — Z00.8 ENCOUNTER FOR OTHER GENERAL EXAMINATION: ICD-10-CM

## 2025-01-09 DIAGNOSIS — Z90.49 ACQUIRED ABSENCE OF OTHER SPECIFIED PARTS OF DIGESTIVE TRACT: Chronic | ICD-10-CM

## 2025-01-09 DIAGNOSIS — Z01.818 ENCOUNTER FOR OTHER PREPROCEDURAL EXAMINATION: ICD-10-CM

## 2025-01-09 DIAGNOSIS — Z98.89 OTHER SPECIFIED POSTPROCEDURAL STATES: Chronic | ICD-10-CM

## 2025-01-09 DIAGNOSIS — Z96.611 PRESENCE OF RIGHT ARTIFICIAL SHOULDER JOINT: Chronic | ICD-10-CM

## 2025-01-09 DIAGNOSIS — R94.31 ABNORMAL ELECTROCARDIOGRAM [ECG] [EKG]: ICD-10-CM

## 2025-01-09 DIAGNOSIS — Z96.651 PRESENCE OF RIGHT ARTIFICIAL KNEE JOINT: Chronic | ICD-10-CM

## 2025-01-09 PROCEDURE — 75574 CT ANGIO HRT W/3D IMAGE: CPT

## 2025-01-09 PROCEDURE — 75574 CT ANGIO HRT W/3D IMAGE: CPT | Mod: 26

## 2025-01-09 RX ORDER — ROSUVASTATIN CALCIUM 20 MG/1
20 TABLET, FILM COATED ORAL
Qty: 90 | Refills: 3 | Status: ACTIVE | COMMUNITY
Start: 2025-01-02 | End: 1900-01-01

## 2025-01-09 NOTE — CHART NOTE - NSCHARTNOTEFT_GEN_A_CORE
MSSA detected from nasal swab done in PST on 1/8/25. Patient instructed to apply Mupirocin to nostrils 2 times per day for 5 days starting 1/17/25. Patient was able to verbalize instructions. Questions answered.

## 2025-01-13 NOTE — ASU PATIENT PROFILE, ADULT - CAREGIVER NAME
Problem: PAIN - ADULT  Goal: Verbalizes/displays adequate comfort level or baseline comfort level  Description: Interventions:  - Encourage patient to monitor pain and request assistance  - Assess pain using appropriate pain scale  - Administer analgesics based on type and severity of pain and evaluate response  - Implement non-pharmacological measures as appropriate and evaluate response  - Consider cultural and social influences on pain and pain management  - Notify physician/advanced practitioner if interventions unsuccessful or patient reports new pain  Outcome: Progressing     Problem: INFECTION - ADULT  Goal: Absence or prevention of progression during hospitalization  Description: INTERVENTIONS:  - Assess and monitor for signs and symptoms of infection  - Monitor lab/diagnostic results  - Monitor all insertion sites, i.e. indwelling lines, tubes, and drains  - Monitor endotracheal if appropriate and nasal secretions for changes in amount and color  - [place] appropriate cooling/warming therapies per order  - Administer medications as ordered  - Instruct and encourage patient and family to use good hand hygiene technique  - Identify and instruct in appropriate isolation precautions for identified infection/condition  Outcome: Progressing  Goal: Absence of fever/infection during neutropenic period  Description: INTERVENTIONS:  - Monitor WBC    Outcome: Progressing     Problem: SAFETY ADULT  Goal: Patient will remain free of falls  Description: INTERVENTIONS:  - Educate patient/family on patient safety including physical limitations  - Instruct patient to call for assistance with activity   - Consult OT/PT to assist with strengthening/mobility   - Keep Call bell within reach  - Keep bed low and locked with side rails adjusted as appropriate  - Keep care items and personal belongings within reach  - Initiate and maintain comfort rounds  - Make Fall Risk Sign visible to staff  - Offer Toileting every  Hours,  in advance of need  - Initiate/Maintain alarm  - Obtain necessary fall risk management equipment:   - Apply yellow socks and bracelet for high fall risk patients  - Consider moving patient to room near nurses station  Outcome: Progressing  Goal: Maintain or return to baseline ADL function  Description: INTERVENTIONS:  -  Assess patient's ability to carry out ADLs; assess patient's baseline for ADL function and identify physical deficits which impact ability to perform ADLs (bathing, care of mouth/teeth, toileting, grooming, dressing, etc.)  - Assess/evaluate cause of self-care deficits   - Assess range of motion  - Assess patient's mobility; develop plan if impaired  - Assess patient's need for assistive devices and provide as appropriate  - Encourage maximum independence but intervene and supervise when necessary  - Involve family in performance of ADLs  - Assess for home care needs following discharge   - Consider OT consult to assist with ADL evaluation and planning for discharge  - Provide patient education as appropriate  Outcome: Progressing  Goal: Maintains/Returns to pre admission functional level  Description: INTERVENTIONS:  - Perform AM-PAC 6 Click Basic Mobility/ Daily Activity assessment daily.  - Set and communicate daily mobility goal to care team and patient/family/caregiver.   - Collaborate with rehabilitation services on mobility goals if consulted  - Perform Range of Motion  times a day.  - Reposition patient every  hours.  - Dangle patient times a day  - Stand patient  times a day  - Ambulate patient  times a day  - Out of bed to chair  times a day   - Out of bed for meals times a day  - Out of bed for toileting  - Record patient progress and toleration of activity level   Outcome: Progressing     Problem: DISCHARGE PLANNING  Goal: Discharge to home or other facility with appropriate resources  Description: INTERVENTIONS:  - Identify barriers to discharge w/patient and caregiver  - Arrange for  needed discharge resources and transportation as appropriate  - Identify discharge learning needs (meds, wound care, etc.)  - Arrange for interpretive services to assist at discharge as needed  - Refer to Case Management Department for coordinating discharge planning if the patient needs post-hospital services based on physician/advanced practitioner order or complex needs related to functional status, cognitive ability, or social support system  Outcome: Progressing     Problem: Knowledge Deficit  Goal: Patient/family/caregiver demonstrates understanding of disease process, treatment plan, medications, and discharge instructions  Description: Complete learning assessment and assess knowledge base.  Interventions:  - Provide teaching at level of understanding  - Provide teaching via preferred learning methods  Outcome: Progressing      Yamile Gomez

## 2025-01-13 NOTE — H&P ADULT - NSICDXPASTMEDICALHX_GEN_ALL_CORE_FT
PAST MEDICAL HISTORY:  2019 novel coronavirus disease (COVID-19)     Arthritis     Asthma WT responder--followed by [facility] monitoring center    BPH (benign prostatic hyperplasia)     Chronic sinusitis     GERD (gastroesophageal reflux disease)     H/O melanoma excision Left thigh    H/O small bowel obstruction     H/O squamous cell carcinoma excision left hand    Hearing loss     History of Thomson's esophagus     Hyperlipidemia     Obese     Sleep apnea CPAP    Ventral hernia without obstruction or gangrene

## 2025-01-13 NOTE — ASU PATIENT PROFILE, ADULT - ABILITY TO HEAR (WITH HEARING AID OR HEARING APPLIANCE IF NORMALLY USED):
[place]- b/l hearing aids aleft at home/Mildly to Moderately Impaired: difficulty hearing in some environments or speaker may need to increase volume or speak distinctly

## 2025-01-13 NOTE — H&P ADULT - HISTORY OF PRESENT ILLNESS
64 y.o male with PMHx of HLD, SBO, s/p colon resection, multiple knee/ shoulder surgeries, s/p LHC in 5/2013 with normal coronaries  who scheduled for Rt knee surgery on 1/22/25 presented to cardiology office for pre-op clearance. Pt was found to have new RBBB on EKG. Pt underwent CCTA, showed severe proximal LCx disease.   Pt referred cardiac cath for further ischemic evaluation.  64 y.o male with PMHx of HLD, SBO, s/p colon resection, multiple knee/ shoulder surgeries, s/p LHC in 5/2013 with normal coronaries  who scheduled for Rt knee surgery on 1/22/25 presented to cardiology office for pre-op clearance. Pt was found to have new RBBB on EKG. Pt underwent CCTA, showed severe proximal LCx disease and calcium score 1472.  Pt referred cardiac cath for further ischemic evaluation.

## 2025-01-13 NOTE — H&P ADULT - NSICDXFAMILYHX_GEN_ALL_CORE_FT
FAMILY HISTORY:  Father  Still living? Unknown  FHx: skin cancer, Age at diagnosis: Age Unknown    Mother  Still living? Yes, Estimated age: 81-90  Family history of hyperlipidemia, Age at diagnosis: Age Unknown  FHx: skin cancer, Age at diagnosis: Age Unknown

## 2025-01-13 NOTE — H&P ADULT - PROBLEM SELECTOR PLAN 1
Plan for [facility] with possible PCI   - BOB protocol pre hydration: NS 250cc IV bolus x1   - Procedure, its risks, alternatives, benefits and potential complications were discussed in detail. Risks include but not limited to bleeding, infection, allergy, renal failure requiring dialysis, stroke, vascular injury, pericardial tamponade, arrhythmias, MI and even death. Pt is agreeable and has consented for cardiac catheterization with possible stent placement and possible sedation and/ or analgesia. -plan for cardiac cath for ischemic work up  - BOB protocol pre hydration: NS 250cc IV bolus x1 -procedure of cardiac catheterization w/ coronary angiogram and possible stent placement, with possible sedation and analgia explained. Pt is competent, has capacity, and understands risks and benefits of procedure. Risks and benefits discussed. Risks include but not limited to bleeding, infection, allergy, renal failure requiring dialysis, stroke, and vascular injury.  All questions answered   - consent to be obtained from attending

## 2025-01-13 NOTE — H&P ADULT - NSHPLABSRESULTS_GEN_ALL_CORE
EKG (1/2/25): NSR at 68 bpm, RBBB with Lt axis- bifascicular block  CCTA (1/9/25): severe (about 70%) eccentric mixed calcified and noncalcified plaque within the proximal LCx , mild (< 50%) in RCA and LAD  Cath Report (5/1/2013) from [facility]: EF 70%, normal coronaries

## 2025-01-13 NOTE — H&P ADULT - ASSESSMENT
64 y.o male with PMHx of HLD, SBO, s/p colon resection, multiple knee/ shoulder surgeries, s/p LHC in 5/2013 with normal coronaries  who scheduled for Rt knee surgery on 1/22/25 presented to cardiology office for pre-op clearance. Pt was found to have new RBBB on EKG. Pt underwent CCTA, showed severe proximal LCx disease.   Pt referred cardiac cath for further ischemic evaluation.     ASA class:  Cr:  GFR:  Bleeding risk:  Derek score:    64 y.o male with PMHx of HLD, SBO, s/p colon resection, multiple knee/ shoulder surgeries, s/p LHC in 5/2013 with normal coronaries  who scheduled for Rt knee surgery on 1/22/25 presented to cardiology office for pre-op clearance. Pt was found to have new RBBB on EKG. Pt underwent CCTA, showed severe proximal LCx disease and calcium score 1472.  Pt referred cardiac cath for further ischemic evaluation.     ASA class: II  Cr: 14.8  GFR: 97  Bleeding risk: 0.8  Derek score:  1

## 2025-01-14 ENCOUNTER — TRANSCRIPTION ENCOUNTER (OUTPATIENT)
Age: 65
End: 2025-01-14

## 2025-01-14 ENCOUNTER — OUTPATIENT (OUTPATIENT)
Dept: OUTPATIENT SERVICES | Facility: HOSPITAL | Age: 65
LOS: 1 days | Discharge: HOME | End: 2025-01-14
Payer: MEDICARE

## 2025-01-14 VITALS
SYSTOLIC BLOOD PRESSURE: 123 MMHG | HEIGHT: 68 IN | RESPIRATION RATE: 18 BRPM | WEIGHT: 235.01 LBS | OXYGEN SATURATION: 100 % | DIASTOLIC BLOOD PRESSURE: 85 MMHG | TEMPERATURE: 98 F | HEART RATE: 68 BPM

## 2025-01-14 VITALS
DIASTOLIC BLOOD PRESSURE: 62 MMHG | SYSTOLIC BLOOD PRESSURE: 125 MMHG | HEART RATE: 67 BPM | OXYGEN SATURATION: 96 % | RESPIRATION RATE: 18 BRPM

## 2025-01-14 DIAGNOSIS — Z96.611 PRESENCE OF RIGHT ARTIFICIAL SHOULDER JOINT: Chronic | ICD-10-CM

## 2025-01-14 DIAGNOSIS — Z90.49 ACQUIRED ABSENCE OF OTHER SPECIFIED PARTS OF DIGESTIVE TRACT: Chronic | ICD-10-CM

## 2025-01-14 DIAGNOSIS — Z98.89 OTHER SPECIFIED POSTPROCEDURAL STATES: Chronic | ICD-10-CM

## 2025-01-14 DIAGNOSIS — R94.39 ABNORMAL RESULT OF OTHER CARDIOVASCULAR FUNCTION STUDY: ICD-10-CM

## 2025-01-14 DIAGNOSIS — Z98.890 OTHER SPECIFIED POSTPROCEDURAL STATES: Chronic | ICD-10-CM

## 2025-01-14 DIAGNOSIS — R94.30 ABNORMAL RESULT OF CARDIOVASCULAR FUNCTION STUDY, UNSPECIFIED: ICD-10-CM

## 2025-01-14 DIAGNOSIS — Z96.651 PRESENCE OF RIGHT ARTIFICIAL KNEE JOINT: Chronic | ICD-10-CM

## 2025-01-14 DIAGNOSIS — Z98.1 ARTHRODESIS STATUS: Chronic | ICD-10-CM

## 2025-01-14 DIAGNOSIS — Z96.612 PRESENCE OF LEFT ARTIFICIAL SHOULDER JOINT: Chronic | ICD-10-CM

## 2025-01-14 PROCEDURE — C1769: CPT

## 2025-01-14 PROCEDURE — 93458 L HRT ARTERY/VENTRICLE ANGIO: CPT | Mod: 26

## 2025-01-14 PROCEDURE — 93458 L HRT ARTERY/VENTRICLE ANGIO: CPT

## 2025-01-14 PROCEDURE — C1887: CPT

## 2025-01-14 PROCEDURE — C1894: CPT

## 2025-01-14 PROCEDURE — 99152 MOD SED SAME PHYS/QHP 5/>YRS: CPT

## 2025-01-14 RX ORDER — ASPIRIN 81 MG
0 TABLET, DELAYED RELEASE (ENTERIC COATED) ORAL
Refills: 0 | DISCHARGE

## 2025-01-14 RX ORDER — SODIUM CHLORIDE 9 MG/ML
1000 INJECTION, SOLUTION INTRAMUSCULAR; INTRAVENOUS; SUBCUTANEOUS
Refills: 0 | Status: DISCONTINUED | OUTPATIENT
Start: 2025-01-14 | End: 2025-01-14

## 2025-01-14 RX ORDER — SODIUM CHLORIDE 9 MG/ML
250 INJECTION, SOLUTION INTRAMUSCULAR; INTRAVENOUS; SUBCUTANEOUS ONCE
Refills: 0 | Status: COMPLETED | OUTPATIENT
Start: 2025-01-14 | End: 2025-01-14

## 2025-01-14 RX ADMIN — SODIUM CHLORIDE 250 MILLILITER(S): 9 INJECTION, SOLUTION INTRAMUSCULAR; INTRAVENOUS; SUBCUTANEOUS at 09:04

## 2025-01-14 RX ADMIN — SODIUM CHLORIDE 200 MILLILITER(S): 9 INJECTION, SOLUTION INTRAMUSCULAR; INTRAVENOUS; SUBCUTANEOUS at 11:11

## 2025-01-14 NOTE — ASU DISCHARGE PLAN (ADULT/PEDIATRIC) - FINANCIAL ASSISTANCE
[facility] provides services at a reduced cost to those who are determined to be eligible through [facility]’s financial assistance program. Information regarding [facility]’s financial assistance program can be found by going to [email or web address] or by calling [phone].

## 2025-01-14 NOTE — BRIEF OPERATIVE NOTE - NSICDXBRIEFPOSTOP_GEN_ALL_CORE_FT
POST-OP DIAGNOSIS:  Nonobstructive atherosclerosis of coronary artery 14-Jan-2025 14:14:08  Milena Ahumada

## 2025-01-14 NOTE — ASU DISCHARGE PLAN (ADULT/PEDIATRIC) - CARE PROVIDER_API CALL
Laura Collier  Cardiovascular Disease  [address] [place] [address]  Phone: [phone]  Fax: [phone]  Established Patient  Follow Up Time: 1-3 days

## 2025-01-14 NOTE — PACU DISCHARGE NOTE - COMMENTS
Patient s/p LHC via Right Radial Artery. Gauze and tegaderm to RUE. No s/s of bleeding or hematoma. RUE warm and mobile. VS Stable. patient denies pain. Discharge instructions reviewed and patient verbalizes understanding. SL removed. Patient pending transport to the lobby at this time to meet his wife for discharge home

## 2025-01-14 NOTE — PROGRESS NOTE ADULT - SUBJECTIVE AND OBJECTIVE BOX
Department of Cardiology                                                               Division of Interventional Cardiology                                                               [facility] /[address] [address]                                                                                 [phone]           Post Procedure Progress Note  Patient is a 64y old  Male who presents with a chief complaint of LHC (13 Jan 2025 12:32)      Patient is  now s/p left heart catheterization    s/p LHC : revealing non obs disease  Pt denies chest pain/SOB/palpitations post cath.  PROCEDURE SITE: ---RRA accessed. hemoband to be removed 1 hour post placement. -- Site is without hematoma or bleeding. Sensation and EMERITA intact. Distal pulses palpable 2+, capillary refill < 2 seconds.       Vital Signs  T(C): 36.6 (01-14-25 @ 08:48), Max: 36.6 (01-14-25 @ 08:48)  HR: 60 (01-14-25 @ 10:50) (60 - 68)  BP: 110/73 (01-14-25 @ 10:50) (110/73 - 130/73)  RR: 18 (01-14-25 @ 10:50) (18 - 18)  SpO2: 97% (01-14-25 @ 10:50) (97% - 100%)  Wt(kg): --    Home Medications:  Albuterol (Eqv-ProAir HFA) 90 mcg/inh inhalation aerosol: 2 puff(s) inhaled prn (14 Jan 2025 08:48)  aspirin 81 mg oral tablet: orally once a day (14 Jan 2025 08:48)  omeprazole 40 mg oral delayed release capsule: 1 cap(s) orally once a day (at bedtime) (14 Jan 2025 08:48)  rosuvastatin 5 mg oral tablet: 1 tab(s) orally once a day (14 Jan 2025 08:48)      PHYSICAL EXAM:  NEURO: Non-focal, AxOx3.  No neuro deficits   CHEST/LUNG: Clear to auscultation bilaterally; No wheeze  HEART: s1 s2 Regular rate and rhythm; No murmurs, rubs, or gallops  ABDOMEN: Soft, Nontender, Nondistended; Bowel sounds present X 4 quadrants   EXTREMITIES:  2+ Peripheral Pulses, No clubbing, cyanosis, or edema   VASCULAR: Peripheral pulses palpable 2+ bilaterally      PROCEDURE RESULTS:  full report to follow               PLAN:  -VS, diet, activity as per post cath orders  - IVF per BOB protocol   -Encourage PO fluids  -Continue current medications  -Post cath instructions reviewed, post sedation instructions reviewed; patient verbalizes and understands instructions  -Discussed therapeutic lifestyle changes to reduce risk factors such as following a cardiac diet, weight loss, maintaining a healthy weight, exercise, smoking cessation, medication compliance, and regular follow-up  with PCP/Cardioloigst  - Patient to be discharged home, recommend following up with cardiologist prior to scheduled knee surgery   -Plan of care discussed with patient, RN and Dr. Hinojosa

## 2025-01-14 NOTE — ASU DISCHARGE PLAN (ADULT/PEDIATRIC) - ASU DC SPECIAL INSTRUCTIONSFT
follow up with Dr. Collier for further complete cardiac clearance for upcoming surgery     continue all medications

## 2025-01-15 NOTE — POST DISCHARGE NOTE - ADDITIONAL DOCUMENTATION:
Post procedure phone call completed; patient understood all discharge paperwork. No questions regarding medications or pain management. MD follow up appointment made. Patient was able to rest when they were discharged. Patient will recommend [facility], no complaints of hospital stay, satisfied with care. Instructed patient to contact provider with any further questions or concerns.

## 2025-01-16 ENCOUNTER — RESULT REVIEW (OUTPATIENT)
Age: 65
End: 2025-01-16

## 2025-01-16 ENCOUNTER — OUTPATIENT (OUTPATIENT)
Dept: OUTPATIENT SERVICES | Facility: HOSPITAL | Age: 65
LOS: 1 days | End: 2025-01-16
Payer: MEDICARE

## 2025-01-16 ENCOUNTER — APPOINTMENT (OUTPATIENT)
Dept: CV DIAGNOSITCS | Facility: HOSPITAL | Age: 65
End: 2025-01-16

## 2025-01-16 DIAGNOSIS — Z90.49 ACQUIRED ABSENCE OF OTHER SPECIFIED PARTS OF DIGESTIVE TRACT: Chronic | ICD-10-CM

## 2025-01-16 DIAGNOSIS — Z96.611 PRESENCE OF RIGHT ARTIFICIAL SHOULDER JOINT: Chronic | ICD-10-CM

## 2025-01-16 DIAGNOSIS — I25.10 ATHEROSCLEROTIC HEART DISEASE OF NATIVE CORONARY ARTERY WITHOUT ANGINA PECTORIS: ICD-10-CM

## 2025-01-16 DIAGNOSIS — Z98.1 ARTHRODESIS STATUS: Chronic | ICD-10-CM

## 2025-01-16 DIAGNOSIS — R94.39 ABNORMAL RESULT OF OTHER CARDIOVASCULAR FUNCTION STUDY: ICD-10-CM

## 2025-01-16 DIAGNOSIS — Z98.890 OTHER SPECIFIED POSTPROCEDURAL STATES: Chronic | ICD-10-CM

## 2025-01-16 DIAGNOSIS — Z96.651 PRESENCE OF RIGHT ARTIFICIAL KNEE JOINT: Chronic | ICD-10-CM

## 2025-01-16 DIAGNOSIS — Z96.612 PRESENCE OF LEFT ARTIFICIAL SHOULDER JOINT: Chronic | ICD-10-CM

## 2025-01-16 DIAGNOSIS — R94.31 ABNORMAL ELECTROCARDIOGRAM [ECG] [EKG]: ICD-10-CM

## 2025-01-16 PROCEDURE — 93306 TTE W/DOPPLER COMPLETE: CPT | Mod: 26

## 2025-01-20 ENCOUNTER — NON-APPOINTMENT (OUTPATIENT)
Age: 65
End: 2025-01-20

## 2025-01-21 RX ORDER — CELECOXIB 200 MG
200 CAPSULE ORAL
Refills: 0 | Status: DISCONTINUED | OUTPATIENT
Start: 2025-02-06 | End: 2025-02-06

## 2025-01-21 RX ORDER — ONDANSETRON 4 MG/1
4 TABLET, ORALLY DISINTEGRATING ORAL EVERY 6 HOURS
Refills: 0 | Status: DISCONTINUED | OUTPATIENT
Start: 2025-02-06 | End: 2025-02-06

## 2025-01-21 RX ORDER — SENNOSIDES 8.6 MG
2 TABLET ORAL AT BEDTIME
Refills: 0 | Status: DISCONTINUED | OUTPATIENT
Start: 2025-02-06 | End: 2025-02-06

## 2025-01-21 RX ORDER — OXYCODONE HYDROCHLORIDE 30 MG/1
10 TABLET ORAL
Refills: 0 | Status: DISCONTINUED | OUTPATIENT
Start: 2025-02-06 | End: 2025-02-06

## 2025-01-21 RX ORDER — OXYCODONE HYDROCHLORIDE 30 MG/1
5 TABLET ORAL
Refills: 0 | Status: DISCONTINUED | OUTPATIENT
Start: 2025-02-06 | End: 2025-02-06

## 2025-01-21 RX ORDER — RIVAROXABAN 20 MG/1
10 TABLET, FILM COATED ORAL DAILY
Refills: 0 | Status: DISCONTINUED | OUTPATIENT
Start: 2025-02-06 | End: 2025-02-06

## 2025-01-21 RX ORDER — CEFADROXIL 500 MG
500 CAPSULE ORAL
Refills: 0 | Status: DISCONTINUED | OUTPATIENT
Start: 2025-02-06 | End: 2025-02-06

## 2025-01-21 RX ORDER — POLYETHYLENE GLYCOL 3350 17 G/17G
17 POWDER, FOR SOLUTION ORAL AT BEDTIME
Refills: 0 | Status: DISCONTINUED | OUTPATIENT
Start: 2025-02-06 | End: 2025-02-06

## 2025-01-21 RX ORDER — SODIUM CHLORIDE 9 G/ML
1000 INJECTION, SOLUTION INTRAVENOUS
Refills: 0 | Status: DISCONTINUED | OUTPATIENT
Start: 2025-02-06 | End: 2025-02-06

## 2025-01-21 RX ORDER — MECOBAL/LEVOMEFOLAT CA/B6 PHOS 2-3-35 MG
1 TABLET ORAL DAILY
Refills: 0 | Status: DISCONTINUED | OUTPATIENT
Start: 2025-02-06 | End: 2025-02-06

## 2025-01-21 RX ORDER — ACETAMINOPHEN 160 MG/5ML
1000 SUSPENSION ORAL EVERY 8 HOURS
Refills: 0 | Status: DISCONTINUED | OUTPATIENT
Start: 2025-02-06 | End: 2025-02-06

## 2025-01-21 RX ORDER — PANTOPRAZOLE 20 MG/1
40 TABLET, DELAYED RELEASE ORAL
Refills: 0 | Status: DISCONTINUED | OUTPATIENT
Start: 2025-02-06 | End: 2025-02-06

## 2025-01-21 RX ORDER — HYDROMORPHONE HYDROCHLORIDE 4 MG/ML
0.5 INJECTION, SOLUTION INTRAMUSCULAR; INTRAVENOUS; SUBCUTANEOUS
Refills: 0 | Status: DISCONTINUED | OUTPATIENT
Start: 2025-02-06 | End: 2025-02-06

## 2025-01-21 RX ORDER — TRANEXAMIC ACID 100 MG/ML
1000 INJECTION, SOLUTION INTRAVENOUS ONCE
Refills: 0 | Status: DISCONTINUED | OUTPATIENT
Start: 2025-02-05 | End: 2025-02-06

## 2025-02-05 ENCOUNTER — TRANSCRIPTION ENCOUNTER (OUTPATIENT)
Age: 65
End: 2025-02-05

## 2025-02-05 ENCOUNTER — APPOINTMENT (OUTPATIENT)
Dept: ORTHOPEDIC SURGERY | Facility: HOSPITAL | Age: 65
End: 2025-02-05

## 2025-02-05 ENCOUNTER — INPATIENT (INPATIENT)
Facility: HOSPITAL | Age: 65
LOS: 0 days | Discharge: HOME HEALTH | DRG: 561 | End: 2025-02-06
Attending: STUDENT IN AN ORGANIZED HEALTH CARE EDUCATION/TRAINING PROGRAM | Admitting: STUDENT IN AN ORGANIZED HEALTH CARE EDUCATION/TRAINING PROGRAM
Payer: MEDICARE

## 2025-02-05 ENCOUNTER — RESULT REVIEW (OUTPATIENT)
Age: 65
End: 2025-02-05

## 2025-02-05 VITALS
SYSTOLIC BLOOD PRESSURE: 121 MMHG | DIASTOLIC BLOOD PRESSURE: 75 MMHG | HEART RATE: 60 BPM | WEIGHT: 240.3 LBS | HEIGHT: 68 IN | RESPIRATION RATE: 15 BRPM | OXYGEN SATURATION: 100 % | TEMPERATURE: 98 F

## 2025-02-05 DIAGNOSIS — Z96.651 PRESENCE OF RIGHT ARTIFICIAL KNEE JOINT: Chronic | ICD-10-CM

## 2025-02-05 DIAGNOSIS — Z90.49 ACQUIRED ABSENCE OF OTHER SPECIFIED PARTS OF DIGESTIVE TRACT: Chronic | ICD-10-CM

## 2025-02-05 DIAGNOSIS — Z98.890 OTHER SPECIFIED POSTPROCEDURAL STATES: Chronic | ICD-10-CM

## 2025-02-05 DIAGNOSIS — T84.032A MECHANICAL LOOSENING OF INTERNAL RIGHT KNEE PROSTHETIC JOINT, INITIAL ENCOUNTER: ICD-10-CM

## 2025-02-05 DIAGNOSIS — Z98.1 ARTHRODESIS STATUS: Chronic | ICD-10-CM

## 2025-02-05 DIAGNOSIS — Z96.612 PRESENCE OF LEFT ARTIFICIAL SHOULDER JOINT: Chronic | ICD-10-CM

## 2025-02-05 DIAGNOSIS — Z98.89 OTHER SPECIFIED POSTPROCEDURAL STATES: Chronic | ICD-10-CM

## 2025-02-05 DIAGNOSIS — Z96.611 PRESENCE OF RIGHT ARTIFICIAL SHOULDER JOINT: Chronic | ICD-10-CM

## 2025-02-05 LAB
ANION GAP SERPL CALC-SCNC: 6 MMOL/L — SIGNIFICANT CHANGE UP (ref 5–17)
BUN SERPL-MCNC: 10 MG/DL — SIGNIFICANT CHANGE UP (ref 7–23)
CALCIUM SERPL-MCNC: 9.1 MG/DL — SIGNIFICANT CHANGE UP (ref 8.5–10.1)
CHLORIDE SERPL-SCNC: 104 MMOL/L — SIGNIFICANT CHANGE UP (ref 96–108)
CO2 SERPL-SCNC: 26 MMOL/L — SIGNIFICANT CHANGE UP (ref 22–31)
CREAT SERPL-MCNC: 0.87 MG/DL — SIGNIFICANT CHANGE UP (ref 0.5–1.3)
EGFR: 95 ML/MIN/1.73M2 — SIGNIFICANT CHANGE UP
GLUCOSE BLDC GLUCOMTR-MCNC: 92 MG/DL — SIGNIFICANT CHANGE UP (ref 70–99)
GLUCOSE SERPL-MCNC: 138 MG/DL — HIGH (ref 70–99)
HCT VFR BLD CALC: 38.4 % — LOW (ref 39–50)
HGB BLD-MCNC: 12.9 G/DL — LOW (ref 13–17)
MCHC RBC-ENTMCNC: 31.7 PG — SIGNIFICANT CHANGE UP (ref 27–34)
MCHC RBC-ENTMCNC: 33.6 G/DL — SIGNIFICANT CHANGE UP (ref 32–36)
MCV RBC AUTO: 94.3 FL — SIGNIFICANT CHANGE UP (ref 80–100)
PLATELET # BLD AUTO: 178 K/UL — SIGNIFICANT CHANGE UP (ref 150–400)
POTASSIUM SERPL-MCNC: 4.2 MMOL/L — SIGNIFICANT CHANGE UP (ref 3.5–5.3)
POTASSIUM SERPL-SCNC: 4.2 MMOL/L — SIGNIFICANT CHANGE UP (ref 3.5–5.3)
RBC # BLD: 4.07 M/UL — LOW (ref 4.2–5.8)
RBC # FLD: 12.6 % — SIGNIFICANT CHANGE UP (ref 10.3–14.5)
SODIUM SERPL-SCNC: 136 MMOL/L — SIGNIFICANT CHANGE UP (ref 135–145)
WBC # BLD: 8.48 K/UL — SIGNIFICANT CHANGE UP (ref 3.8–10.5)
WBC # FLD AUTO: 8.48 K/UL — SIGNIFICANT CHANGE UP (ref 3.8–10.5)

## 2025-02-05 PROCEDURE — 87116 MYCOBACTERIA CULTURE: CPT

## 2025-02-05 PROCEDURE — C1776: CPT

## 2025-02-05 PROCEDURE — 36415 COLL VENOUS BLD VENIPUNCTURE: CPT

## 2025-02-05 PROCEDURE — 87070 CULTURE OTHR SPECIMN AEROBIC: CPT

## 2025-02-05 PROCEDURE — 97530 THERAPEUTIC ACTIVITIES: CPT | Mod: GP

## 2025-02-05 PROCEDURE — 88300 SURGICAL PATH GROSS: CPT

## 2025-02-05 PROCEDURE — 87015 SPECIMEN INFECT AGNT CONCNTJ: CPT

## 2025-02-05 PROCEDURE — 11981 INSERTION DRUG DLVR IMPLANT: CPT

## 2025-02-05 PROCEDURE — 27487 REVISE/REPLACE KNEE JOINT: CPT | Mod: RT

## 2025-02-05 PROCEDURE — 80048 BASIC METABOLIC PNL TOTAL CA: CPT

## 2025-02-05 PROCEDURE — 85027 COMPLETE CBC AUTOMATED: CPT

## 2025-02-05 PROCEDURE — 88300 SURGICAL PATH GROSS: CPT | Mod: 26

## 2025-02-05 PROCEDURE — 73560 X-RAY EXAM OF KNEE 1 OR 2: CPT | Mod: 26,RT

## 2025-02-05 PROCEDURE — 97116 GAIT TRAINING THERAPY: CPT | Mod: GP

## 2025-02-05 PROCEDURE — 97607 NEG PRS WND THR NDME<=50SQCM: CPT

## 2025-02-05 PROCEDURE — 97162 PT EVAL MOD COMPLEX 30 MIN: CPT | Mod: GP

## 2025-02-05 PROCEDURE — C1713: CPT

## 2025-02-05 PROCEDURE — 87075 CULTR BACTERIA EXCEPT BLOOD: CPT

## 2025-02-05 PROCEDURE — 73560 X-RAY EXAM OF KNEE 1 OR 2: CPT | Mod: RT

## 2025-02-05 PROCEDURE — 82962 GLUCOSE BLOOD TEST: CPT

## 2025-02-05 PROCEDURE — 87206 SMEAR FLUORESCENT/ACID STAI: CPT

## 2025-02-05 PROCEDURE — 87102 FUNGUS ISOLATION CULTURE: CPT

## 2025-02-05 RX ORDER — OXYCODONE HYDROCHLORIDE 30 MG/1
1 TABLET ORAL
Qty: 20 | Refills: 0
Start: 2025-02-05 | End: 2025-02-09

## 2025-02-05 RX ORDER — ROSUVASTATIN CALCIUM 10 MG/1
5 TABLET, FILM COATED ORAL AT BEDTIME
Refills: 0 | Status: DISCONTINUED | OUTPATIENT
Start: 2025-02-06 | End: 2025-02-06

## 2025-02-05 RX ORDER — CELECOXIB 200 MG
1 CAPSULE ORAL
Qty: 60 | Refills: 0
Start: 2025-02-05 | End: 2025-03-06

## 2025-02-05 RX ORDER — POLYETHYLENE GLYCOL 3350 17 G/17G
17 POWDER, FOR SOLUTION ORAL
Qty: 119 | Refills: 0
Start: 2025-02-05 | End: 2025-02-11

## 2025-02-05 RX ORDER — RIVAROXABAN 20 MG/1
1 TABLET, FILM COATED ORAL
Qty: 28 | Refills: 0
Start: 2025-02-05 | End: 2025-03-04

## 2025-02-05 RX ORDER — FENTANYL CITRATE 50 UG/ML
50 INJECTION INTRAMUSCULAR; INTRAVENOUS
Refills: 0 | Status: DISCONTINUED | OUTPATIENT
Start: 2025-02-06 | End: 2025-02-06

## 2025-02-05 RX ORDER — OXYCODONE HYDROCHLORIDE 30 MG/1
5 TABLET ORAL ONCE
Refills: 0 | Status: DISCONTINUED | OUTPATIENT
Start: 2025-02-06 | End: 2025-02-06

## 2025-02-05 RX ORDER — ONDANSETRON 4 MG/1
4 TABLET, ORALLY DISINTEGRATING ORAL ONCE
Refills: 0 | Status: DISCONTINUED | OUTPATIENT
Start: 2025-02-05 | End: 2025-02-06

## 2025-02-05 RX ORDER — SENNOSIDES 8.6 MG
2 TABLET ORAL
Qty: 14 | Refills: 0
Start: 2025-02-05 | End: 2025-02-11

## 2025-02-05 RX ORDER — ONDANSETRON 4 MG/1
4 TABLET, ORALLY DISINTEGRATING ORAL ONCE
Refills: 0 | Status: DISCONTINUED | OUTPATIENT
Start: 2025-02-06 | End: 2025-02-06

## 2025-02-05 RX ORDER — SODIUM CHLORIDE 9 G/ML
1000 INJECTION, SOLUTION INTRAVENOUS
Refills: 0 | Status: DISCONTINUED | OUTPATIENT
Start: 2025-02-05 | End: 2025-02-06

## 2025-02-05 RX ORDER — ACETAMINOPHEN 160 MG/5ML
2 SUSPENSION ORAL
Qty: 84 | Refills: 0
Start: 2025-02-05 | End: 2025-02-18

## 2025-02-05 RX ORDER — ONDANSETRON 4 MG/1
1 TABLET, ORALLY DISINTEGRATING ORAL
Qty: 9 | Refills: 0
Start: 2025-02-05 | End: 2025-02-07

## 2025-02-05 RX ORDER — SODIUM CHLORIDE 9 G/ML
500 INJECTION, SOLUTION INTRAVENOUS ONCE
Refills: 0 | Status: COMPLETED | OUTPATIENT
Start: 2025-02-05 | End: 2025-02-05

## 2025-02-05 RX ORDER — OXYCODONE HYDROCHLORIDE 30 MG/1
5 TABLET ORAL ONCE
Refills: 0 | Status: DISCONTINUED | OUTPATIENT
Start: 2025-02-05 | End: 2025-02-06

## 2025-02-05 RX ORDER — HYDROMORPHONE HYDROCHLORIDE 4 MG/ML
0.5 INJECTION, SOLUTION INTRAMUSCULAR; INTRAVENOUS; SUBCUTANEOUS
Refills: 0 | Status: DISCONTINUED | OUTPATIENT
Start: 2025-02-05 | End: 2025-02-06

## 2025-02-05 RX ORDER — ROSUVASTATIN CALCIUM 10 MG/1
1 TABLET, FILM COATED ORAL
Refills: 0 | DISCHARGE

## 2025-02-05 RX ORDER — APREPITANT 40 MG/1
40 CAPSULE ORAL ONCE
Refills: 0 | Status: COMPLETED | OUTPATIENT
Start: 2025-02-05 | End: 2025-02-05

## 2025-02-05 RX ORDER — CEFADROXIL 500 MG
1 CAPSULE ORAL
Qty: 14 | Refills: 0
Start: 2025-02-05 | End: 2025-02-11

## 2025-02-05 RX ORDER — ALBUTEROL 90 MCG
2 AEROSOL REFILL (GRAM) INHALATION EVERY 6 HOURS
Refills: 0 | Status: DISCONTINUED | OUTPATIENT
Start: 2025-02-06 | End: 2025-02-06

## 2025-02-05 RX ADMIN — APREPITANT 40 MILLIGRAM(S): 40 CAPSULE ORAL at 16:13

## 2025-02-05 RX ADMIN — SODIUM CHLORIDE 500 MILLILITER(S): 9 INJECTION, SOLUTION INTRAVENOUS at 22:00

## 2025-02-05 NOTE — ASU PATIENT PROFILE, ADULT - NSICDXPASTMEDICALHX_GEN_ALL_CORE_FT
PAST MEDICAL HISTORY:  2019 novel coronavirus disease (COVID-19)     Arthritis     Asthma WT responder--followed by [facility] monitoring center    BPH (benign prostatic hyperplasia)     Chronic sinusitis     GERD (gastroesophageal reflux disease)     H/O melanoma excision Left thigh    H/O small bowel obstruction     H/O squamous cell carcinoma excision left hand    Hearing loss     History of Thomson's esophagus     Hyperlipidemia     Obese     Sleep apnea CPAP    Ventral hernia without obstruction or gangrene

## 2025-02-05 NOTE — DISCHARGE NOTE PROVIDER - NSDCFUADDINST_GEN_ALL_CORE_FT
Discharge Instructions for Total Knee Arthroplasty    1. ACTIVITY: WBAT, Rolling walker, Daily PT. Gentle ROM 0-full as tolerated. Walk plenty.  Keep a bump (rolled towel or blanket) under your heel to keep leg straight while in bed or chair for 2 weeks.  2. CALL WITH: fever over 101, wound redness, drainage or open area, calf pain/calf swelling  3. BANDAGE: BANDAGE: Keep bandage on until POD14 (2/19/25). Change ONLY if saturated to Mepilex per Physical Therapist.   4. SHOWER: Remove outer ACE wrap and throw it away. Shower OK. No Tub baths.  5. STAPLES: There are NO Staples to remove. Sutures are Dissolvable.  6. BLOOD CLOT PREVENTION: Aspirin 81mg Twice daily (BID) for 28 days  7. GI: Continue Omeprazole daily x 30 days.  8. FOLLOW UP: Dr. Roche in 21 days. Call to schedule.   9. MEDICATIONS:  If going home, eRX sent to your pharmacy for .  DO NOT  or take the Rx if going to REHAB.   10. ANTIBIOTICS**: Continue PO Duricef twice daily to complete a total of 7 days of antibiotics. eRx sent to the Pharmacy.   **ONLY if eRX sent, otherwise N/A

## 2025-02-05 NOTE — DISCHARGE NOTE PROVIDER - HOSPITAL COURSE
Orthopedic H&P:  Pt is a 64y Male     PAST MEDICAL & SURGICAL HISTORY:  Hyperlipidemia  Sleep apnea, CPAP  Asthma  History of Thomson's esophagus  GERD (gastroesophageal reflux disease)  Arthritis  H/O squamous cell carcinoma excision, left hand  H/O melanoma excision, Left thigh  H/O small bowel obstruction  Chronic sinusitis  BPH (benign prostatic hyperplasia)  Obese  Hearing loss  Ventral hernia without obstruction or gangrene  S/P lumbar fusion  L3-L4  ---2015  S/P inguinal hernia repair, right--1988  H/O umbilical hernia repair, 2005    Now s/p Total Knee Arthroplasty. Pt is afebrile with stable vital signs. Pain is controlled. Alert and Oriented. Exam intact EHL FHL TA GS, +DP. Dressing is clean and dry.    Hospital Course:  Patient presented to [facility] medically cleared for elective Knee Replacement Surgery, having failed outpatient conservative management. Prophylactic antibiotics were started before the procedure and continued for 24 hours. Pt received an adductor canal block in the OR for analgesia per anesthesia protocol. They were admitted after surgery to the orthopedic floor.   There were no complications during the hospital stay. Appropriate home medications were continued.     Routine consults were obtained from Physical Therapy for twice daily PT and from the Hospitalist for Medical Co-management. Patient was placed on anticoagulation.  Pertinent home medications were continued.  Daily labs were followed.      On POD 0 pt was stable overnight. No major events post op. Pt received twice daily PT. The plan is for DC to home with home PT on POD1 (2/6/25). The orthopedic Attending is aware and agrees.    The patient's following condition(s) were actively treated or managed during the hospital stay:  Acute post op blood loss anemia due to surgery that was not clinically significant, required no intervention and was monitored.  BMI>=35  Pulmonary disease, asthma  Sleep Apnea    The patient had no post-operative complications and clinically progressed faster than anticipated.   The patient met criteria for discharge before the 2nd night of the stay. The patient was appropriately and safely discharged home with home PT.    ******ABOVE NOTE IN PREPARATION FOR DISPO PLANNING*******  ******ABOVE NOTE IN PREPARATION FOR DISPO PLANNING*******  ******ABOVE NOTE IN PREPARATION FOR DISPO PLANNING*******     Orthopedic H&P:  Pt is a 64y Male     PAST MEDICAL & SURGICAL HISTORY:  Hyperlipidemia  Sleep apnea, CPAP  Asthma  History of Thomson's esophagus  GERD (gastroesophageal reflux disease)  Arthritis  H/O squamous cell carcinoma excision, left hand  H/O melanoma excision, Left thigh  H/O small bowel obstruction  Chronic sinusitis  BPH (benign prostatic hyperplasia)  Obese  Hearing loss  Ventral hernia without obstruction or gangrene  S/P lumbar fusion  L3-L4  ---2015  S/P inguinal hernia repair, right--1988  H/O umbilical hernia repair, 2005    Now s/p Total Knee Arthroplasty. Pt is afebrile with stable vital signs. Pain is controlled. Alert and Oriented. Exam intact EHL FHL TA GS, +DP. Dressing is clean and dry.    Hospital Course:  Patient presented to [facility] medically cleared for elective Knee Replacement Surgery, having failed outpatient conservative management. Prophylactic antibiotics were started before the procedure and continued for 24 hours. Pt received an adductor canal block in the OR for analgesia per anesthesia protocol. They were admitted after surgery to the orthopedic floor.   There were no complications during the hospital stay. Appropriate home medications were continued.     Routine consults were obtained from Physical Therapy for twice daily PT and from the Hospitalist for Medical Co-management. Patient was placed on anticoagulation.  Pertinent home medications were continued.  Daily labs were followed.      On POD 0 pt was stable overnight. No major events post op. Pt received twice daily PT. The plan is for DC to home with home PT on POD1 (2/6/25). The orthopedic Attending is aware and agrees.    The patient's following condition(s) were actively treated or managed during the hospital stay:  Acute post op blood loss anemia due to surgery that was not clinically significant, required no intervention and was monitored.  BMI>=35  Pulmonary disease, asthma  Sleep Apnea    The patient had no post-operative complications and clinically progressed faster than anticipated.   The patient met criteria for discharge before the 2nd night of the stay. The patient was appropriately and safely discharged home with home PT.

## 2025-02-05 NOTE — DISCHARGE NOTE PROVIDER - NSDCHHCONTACT_GEN_ALL_CORE_FT
As certified below, I, or a nurse practitioner or physician assistant working with me, had a face-to-face encounter that meets the physician face-to-face encounter requirements.
[id number]

## 2025-02-05 NOTE — ASU PREOP CHECKLIST - MUPIRONCIN COMMENTS
case was canceled 2 weeks ago and did not medication. Povidone solution each nare given.  He was given CHZ sponges when he left

## 2025-02-05 NOTE — DISCHARGE NOTE PROVIDER - NPI NUMBER (FOR SYSADMIN USE ONLY) :
What Type Of Note Output Would You Prefer (Optional)?: Standard Output Hpi Title: Evaluation of a Skin Lesion How Severe Are Your Spot(S)?: mild Have Your Spot(S) Been Treated In The Past?: has not been treated [[id number]]

## 2025-02-05 NOTE — DISCHARGE NOTE PROVIDER - NSDCFUSCHEDAPPT_GEN_ALL_CORE_FT
[facility]  ORTHOSURG [address]  Scheduled Appointment: 02/05/2025    [facility]  HNT PreAdmits  Scheduled Appointment: 02/05/2025    [facility]  ORTHOSURG [address] [facility]  Scheduled Appointment: 02/25/2025     [facility] Physician [facility]  ORTHOSUR [address] [facility]  Scheduled Appointment: 02/25/2025

## 2025-02-05 NOTE — DISCHARGE NOTE PROVIDER - NSDCMRMEDTOKEN_GEN_ALL_CORE_FT
Albuterol (Eqv-ProAir HFA) 90 mcg/inh inhalation aerosol: 2 puff(s) inhaled prn  aspirin 81 mg oral tablet: orally once a day  omeprazole 40 mg oral delayed release capsule: 1 cap(s) orally once a day (at bedtime)  rosuvastatin 5 mg oral tablet: 1 tab(s) orally once a day   Albuterol (Eqv-ProAir HFA) 90 mcg/inh inhalation aerosol: 2 puff(s) inhaled prn  aspirin 81 mg oral tablet: orally once a day  cefadroxil 500 mg oral capsule: 1 cap(s) orally 2 times a day Post operative prophylaxis  celecoxib 200 mg oral capsule: 1 cap(s) orally 2 times a day Take two hours after aspirin  MiraLax oral powder for reconstitution: 17 gram(s) orally once a day Start after discontinuing narcotics for constipation  omeprazole 40 mg oral delayed release capsule: 1 cap(s) orally once a day (at bedtime)  ondansetron 4 mg oral tablet, disintegratin tab(s) orally 3 times a day x 3 days For nausea prn  oxyCODONE 5 mg oral tablet: 1 tab(s) orally 4 times a day for severe pain MDD: 4  rosuvastatin 5 mg oral tablet: 1 tab(s) orally once a day  Senna 8.6 mg oral tablet: 2 tab(s) orally once a day (at bedtime) Stool softener  Tylenol Extra Strength 500 mg oral tablet: 2 tab(s) orally 3 times a day  Xarelto 10 mg oral tablet: 1 tab(s) orally once a day   Albuterol (Eqv-ProAir HFA) 90 mcg/inh inhalation aerosol: 2 puff(s) inhaled prn  cefadroxil 500 mg oral capsule: 1 cap(s) orally 2 times a day Post operative prophylaxis  celecoxib 200 mg oral capsule: 1 cap(s) orally 2 times a day Take two hours after aspirin  MiraLax oral powder for reconstitution: 17 gram(s) orally once a day Start after discontinuing narcotics for constipation  omeprazole 40 mg oral delayed release capsule: 1 cap(s) orally once a day (at bedtime)  ondansetron 4 mg oral tablet, disintegratin tab(s) orally 3 times a day x 3 days For nausea prn  oxyCODONE 5 mg oral tablet: 1 tab(s) orally 4 times a day for severe pain MDD: 4  rosuvastatin 5 mg oral tablet: 1 tab(s) orally once a day  Senna 8.6 mg oral tablet: 2 tab(s) orally once a day (at bedtime) Stool softener  Tylenol Extra Strength 500 mg oral tablet: 2 tab(s) orally 3 times a day  Xarelto 10 mg oral tablet: 1 tab(s) orally once a day

## 2025-02-05 NOTE — DISCHARGE NOTE PROVIDER - CARE PROVIDER_API CALL
Con Roche  Adult Reconstructive Orthopaedic Surgery  [address] [place] [address]  Phone: [phone]  Fax: [phone]  Follow Up Time:

## 2025-02-06 ENCOUNTER — TRANSCRIPTION ENCOUNTER (OUTPATIENT)
Age: 65
End: 2025-02-06

## 2025-02-06 VITALS
OXYGEN SATURATION: 96 % | TEMPERATURE: 98 F | DIASTOLIC BLOOD PRESSURE: 76 MMHG | RESPIRATION RATE: 16 BRPM | HEART RATE: 75 BPM | SYSTOLIC BLOOD PRESSURE: 115 MMHG

## 2025-02-06 LAB
ANION GAP SERPL CALC-SCNC: 4 MMOL/L — LOW (ref 5–17)
BUN SERPL-MCNC: 12 MG/DL — SIGNIFICANT CHANGE UP (ref 7–23)
CALCIUM SERPL-MCNC: 9.2 MG/DL — SIGNIFICANT CHANGE UP (ref 8.5–10.1)
CHLORIDE SERPL-SCNC: 102 MMOL/L — SIGNIFICANT CHANGE UP (ref 96–108)
CO2 SERPL-SCNC: 27 MMOL/L — SIGNIFICANT CHANGE UP (ref 22–31)
CREAT SERPL-MCNC: 0.96 MG/DL — SIGNIFICANT CHANGE UP (ref 0.5–1.3)
EGFR: 87 ML/MIN/1.73M2 — SIGNIFICANT CHANGE UP
GLUCOSE SERPL-MCNC: 208 MG/DL — HIGH (ref 70–99)
GRAM STN FLD: SIGNIFICANT CHANGE UP
HCT VFR BLD CALC: 35.8 % — LOW (ref 39–50)
HGB BLD-MCNC: 12.1 G/DL — LOW (ref 13–17)
MCHC RBC-ENTMCNC: 31.6 PG — SIGNIFICANT CHANGE UP (ref 27–34)
MCHC RBC-ENTMCNC: 33.8 G/DL — SIGNIFICANT CHANGE UP (ref 32–36)
MCV RBC AUTO: 93.5 FL — SIGNIFICANT CHANGE UP (ref 80–100)
NIGHT BLUE STAIN TISS: SIGNIFICANT CHANGE UP
PLATELET # BLD AUTO: 188 K/UL — SIGNIFICANT CHANGE UP (ref 150–400)
POTASSIUM SERPL-MCNC: 4.7 MMOL/L — SIGNIFICANT CHANGE UP (ref 3.5–5.3)
POTASSIUM SERPL-SCNC: 4.7 MMOL/L — SIGNIFICANT CHANGE UP (ref 3.5–5.3)
RBC # BLD: 3.83 M/UL — LOW (ref 4.2–5.8)
RBC # FLD: 12.5 % — SIGNIFICANT CHANGE UP (ref 10.3–14.5)
SODIUM SERPL-SCNC: 133 MMOL/L — LOW (ref 135–145)
SPECIMEN SOURCE: SIGNIFICANT CHANGE UP
SPECIMEN SOURCE: SIGNIFICANT CHANGE UP
WBC # BLD: 11.75 K/UL — HIGH (ref 3.8–10.5)
WBC # FLD AUTO: 11.75 K/UL — HIGH (ref 3.8–10.5)

## 2025-02-06 PROCEDURE — 99232 SBSQ HOSP IP/OBS MODERATE 35: CPT

## 2025-02-06 RX ORDER — CEFAZOLIN SODIUM IN 0.9 % NACL 2 G/10 ML
2000 SYRINGE (ML) INTRAVENOUS EVERY 8 HOURS
Refills: 0 | Status: DISCONTINUED | OUTPATIENT
Start: 2025-02-06 | End: 2025-02-06

## 2025-02-06 RX ORDER — SODIUM CHLORIDE 9 G/ML
500 INJECTION, SOLUTION INTRAVENOUS ONCE
Refills: 0 | Status: COMPLETED | OUTPATIENT
Start: 2025-02-06 | End: 2025-02-06

## 2025-02-06 RX ORDER — CEFAZOLIN SODIUM IN 0.9 % NACL 2 G/10 ML
2000 SYRINGE (ML) INTRAVENOUS EVERY 8 HOURS
Refills: 0 | Status: COMPLETED | OUTPATIENT
Start: 2025-02-06 | End: 2025-02-06

## 2025-02-06 RX ORDER — DEXAMETHASONE SODIUM PHOSPHATE 4 MG/ML
8 INJECTION, SOLUTION INTRA-ARTICULAR; INTRALESIONAL; INTRAMUSCULAR; INTRAVENOUS; SOFT TISSUE ONCE
Refills: 0 | Status: COMPLETED | OUTPATIENT
Start: 2025-02-06 | End: 2025-02-06

## 2025-02-06 RX ADMIN — SODIUM CHLORIDE 500 MILLILITER(S): 9 INJECTION, SOLUTION INTRAVENOUS at 00:45

## 2025-02-06 RX ADMIN — DEXAMETHASONE SODIUM PHOSPHATE 101.6 MILLIGRAM(S): 4 INJECTION, SOLUTION INTRA-ARTICULAR; INTRALESIONAL; INTRAMUSCULAR; INTRAVENOUS; SOFT TISSUE at 06:06

## 2025-02-06 RX ADMIN — OXYCODONE HYDROCHLORIDE 10 MILLIGRAM(S): 30 TABLET ORAL at 08:56

## 2025-02-06 RX ADMIN — OXYCODONE HYDROCHLORIDE 10 MILLIGRAM(S): 30 TABLET ORAL at 04:00

## 2025-02-06 RX ADMIN — Medication 2000 MILLIGRAM(S): at 06:06

## 2025-02-06 RX ADMIN — SODIUM CHLORIDE 100 MILLILITER(S): 9 INJECTION, SOLUTION INTRAVENOUS at 00:45

## 2025-02-06 RX ADMIN — Medication 1 TABLET(S): at 08:56

## 2025-02-06 RX ADMIN — Medication 200 MILLIGRAM(S): at 08:56

## 2025-02-06 RX ADMIN — RIVAROXABAN 10 MILLIGRAM(S): 20 TABLET, FILM COATED ORAL at 08:56

## 2025-02-06 RX ADMIN — ACETAMINOPHEN 400 MILLIGRAM(S): 160 SUSPENSION ORAL at 13:14

## 2025-02-06 RX ADMIN — OXYCODONE HYDROCHLORIDE 10 MILLIGRAM(S): 30 TABLET ORAL at 09:40

## 2025-02-06 RX ADMIN — OXYCODONE HYDROCHLORIDE 10 MILLIGRAM(S): 30 TABLET ORAL at 12:21

## 2025-02-06 RX ADMIN — Medication 2000 MILLIGRAM(S): at 13:15

## 2025-02-06 RX ADMIN — OXYCODONE HYDROCHLORIDE 10 MILLIGRAM(S): 30 TABLET ORAL at 03:30

## 2025-02-06 RX ADMIN — ACETAMINOPHEN 1000 MILLIGRAM(S): 160 SUSPENSION ORAL at 13:14

## 2025-02-06 RX ADMIN — OXYCODONE HYDROCHLORIDE 10 MILLIGRAM(S): 30 TABLET ORAL at 12:56

## 2025-02-06 RX ADMIN — ACETAMINOPHEN 1000 MILLIGRAM(S): 160 SUSPENSION ORAL at 06:00

## 2025-02-06 RX ADMIN — ACETAMINOPHEN 400 MILLIGRAM(S): 160 SUSPENSION ORAL at 06:06

## 2025-02-06 RX ADMIN — PANTOPRAZOLE 40 MILLIGRAM(S): 20 TABLET, DELAYED RELEASE ORAL at 06:06

## 2025-02-06 NOTE — PHYSICAL THERAPY INITIAL EVALUATION ADULT - PERTINENT HX OF CURRENT PROBLEM, REHAB EVAL
Pt is a 64y old male PMHx of HLD, SBO, s/p colon resection, multiple knee/shoulder surgeries- admitted s/p right TKA revision with Dr Roche. POD#1.

## 2025-02-06 NOTE — DISCHARGE NOTE NURSING/CASE MANAGEMENT/SOCIAL WORK - NSDCPEFALRISK_GEN_ALL_CORE
For information on Fall & Injury Prevention, visit: [email or web address] OR  [email or web address] OR  https://www.cdc.gov/steadi/patient.html

## 2025-02-06 NOTE — DISCHARGE NOTE NURSING/CASE MANAGEMENT/SOCIAL WORK - FINANCIAL ASSISTANCE
[facility] provides services at a reduced cost to those who are determined to be eligible through [facility]’s financial assistance program. Information regarding [facility]’s financial assistance program can be found by going to [email or web address] or by calling [phone].

## 2025-02-06 NOTE — PHYSICAL THERAPY INITIAL EVALUATION ADULT - ADDITIONAL COMMENTS
Pt lives in a private house, 3 exterior steps to enter (B/L HR) and +FOS within home (B/L HR). Pt owns rolling walker and cane from prior admission.

## 2025-02-06 NOTE — PROGRESS NOTE ADULT - ASSESSMENT
IMPRESSION:      64 y.o male with PMHx of HLD, SBO, s/p colon resection, multiple knee/ shoulder surgeries- admitted s/p right TKA revision with Dr Roche. Hospitalist consulted for medical comanagement.      *s/p right TKA revision   * hx of right TKA   POD#1  monitor VS   neurovascular checks   pain regimen PRN  Ice, elevate and rest   PT/OTeval  Bowel regimen  IVF hydration   C/W prophylactic ABT   diet as tolerated   PPI  VTE prophylaxis  monitor CBC/BMP  encourage IS      * HLD- statin     * VTE prophylaxis  Venodynes  INC mobility  CAPRINI score - 10  xarelto       Thank you for the consult, will follow.

## 2025-02-06 NOTE — PROGRESS NOTE ADULT - SUBJECTIVE AND OBJECTIVE BOX
Patient seen and examined at bedside.  No acute complaints at this time. Pain well controlled. Denies chest pain, shortness of breath, nausea or vomiting.       LABS:                        12.9   8.48  )-----------( 178      ( 05 Feb 2025 21:52 )             38.4     02-05    136  |  104  |  10  ----------------------------<  138[H]  4.2   |  26  |  0.87    Ca    9.1      05 Feb 2025 21:52        Urinalysis Basic - ( 05 Feb 2025 21:52 )    Color: x / Appearance: x / SG: x / pH: x  Gluc: 138 mg/dL / Ketone: x  / Bili: x / Urobili: x   Blood: x / Protein: x / Nitrite: x   Leuk Esterase: x / RBC: x / WBC x   Sq Epi: x / Non Sq Epi: x / Bacteria: x        Culture - Tissue with Gram Stain (collected 02-05-25 @ 17:22)  Source: Tissue  Gram Stain (02-06-25 @ 06:09):    Few polymorphonuclear leukocytes seen per low power field    No organisms seen per oil power field      VITAL SIGNS:  T(C): 36.3 (02-06-25 @ 04:21), Max: 36.4 (02-05-25 @ 15:59)  HR: 69 (02-06-25 @ 04:21) (60 - 85)  BP: 113/67 (02-06-25 @ 04:21) (90/51 - 121/75)  RR: 17 (02-06-25 @ 04:21) (10 - 17)  SpO2: 95% (02-06-25 @ 04:21) (95% - 100%)      PE:    General: NAD, resting comfortably in bed  RLE:   Dressing C/D/I, prevena with good suction  SCDs present bilaterally  Compartments soft and compressible  No calf tenderness bilaterally  +TA/EHL/FHL/GSC  SILT juli/saph/sp/dp/tib  palpable DP/PT            A/P:  64y M s/p R revision TKA POD 1  -PT/OT   -WBAT RLE  -Pain Control  -DVT ppx: xarelto  -FU OR cx  -Continue perioperative abx x 24 hours, followed by duricef protocol  -FU AM Labs  -Rest, ice, compress and elevate the extremity as needed  -Incentive Spirometry  -Medical comanagement appreciated  -dispo pending PT eval  -will discuss with Dr. Roche and advise with changes to plan
Postop Check    Patient tolerated the procedure well. Patient seen and examined at bedside.  No acute complaints at this time. Pain well controlled. Denies chest pain, shortness of breath, nausea or vomiting.     PE:  T(C): 36.3 (02-06-25 @ 00:05), Max: 36.4 (02-05-25 @ 15:59)  HR: 68 (02-06-25 @ 00:05) (60 - 85)  BP: 107/77 (02-06-25 @ 00:05) (90/51 - 121/75)  RR: 17 (02-06-25 @ 00:05) (10 - 17)  SpO2: 95% (02-06-25 @ 00:05) (95% - 100%)    General: NAD, resting comfortably in bed  RLE:   Dressing C/D/I, prevena with good suction  SCDs present bilaterally  Compartments soft and compressible  No calf tenderness bilaterally  +TA/EHL/FHL/GSC  SILT juli/saph/sp/dp/tib  palpable DP/PT    LABS:                        12.9   8.48  )-----------( 178      ( 05 Feb 2025 21:52 )             38.4     02-05    136  |  104  |  10  ----------------------------<  138[H]  4.2   |  26  |  0.87    Ca    9.1      05 Feb 2025 21:52          A/P:  64y M s/p R revision TKA POD 0  -PT/OT   -WBAT RLE  -Pain Control  -DVT ppx: xarelto POD 1  -Continue perioperative abx x 24 hours, followed by duricef protocol  -FU AM Labs  -Rest, ice, compress and elevate the extremity as needed  -Incentive Spirometry  -Medical comanagement appreciated  -dispo pending PT eval  -will discuss with Dr. Roche and advise with changes to plan
64 y.o male with PMHx of HLD, SBO, s/p colon resection, multiple knee/ shoulder surgeries- admitted s/p right TKA revision with Dr Roche. Hospitalist consulted for medical comanagement.  Patient was seen and examined. VSS- complaining of right knee post op pain- he worked with PT and did well.    2/6- Patient was seen and examined. VSS. No acute overnight events. Denies fever, pain or SOB. Tolerating diet.     Vital Signs Last 24 Hrs  T(C): 36.4 (06 Feb 2025 08:00), Max: 36.4 (05 Feb 2025 15:59)  T(F): 97.5 (06 Feb 2025 08:00), Max: 97.5 (05 Feb 2025 15:59)  HR: 63 (06 Feb 2025 08:00) (60 - 85)  BP: 117/68 (06 Feb 2025 08:00) (90/51 - 121/75)  BP(mean): 83 (06 Feb 2025 00:05) (83 - 83)  RR: 16 (06 Feb 2025 08:00) (10 - 17)  SpO2: 97% (06 Feb 2025 08:00) (95% - 100%)    Parameters below as of 06 Feb 2025 08:00  Patient On (Oxygen Delivery Method): room air    ROS:   All 10 systems reviewed and found to be negative with the exception of what has been described above.     PE:  Constitutional: NAD, OOB to chair   HEENT: NC/AT  Back: no tenderness  Respiratory: respirations even and non labored, LCTA  Cardiovascular: S1S2 regular, no murmurs  Abdomen: soft, not tender, not distended, positive BS  Genitourinary: voiding  Musculoskeletal: no muscle tenderness, no joint swelling or tenderness  Extremities: no pedal edema - dressing to right knee with wound vac intact.   Neurological: no focal deficits     MEDICATIONS  (STANDING):  acetaminophen     Tablet .. 1000 milliGRAM(s) Oral every 8 hours  acetaminophen   IVPB .. 1000 milliGRAM(s) IV Intermittent every 8 hours  cefadroxil 500 milliGRAM(s) Oral two times a day  ceFAZolin  Injectable. 2000 milliGRAM(s) IV Push every 8 hours  celecoxib 200 milliGRAM(s) Oral two times a day  lactated ringers. 1000 milliLiter(s) (100 mL/Hr) IV Continuous <Continuous>  multivitamin 1 Tablet(s) Oral daily  pantoprazole    Tablet 40 milliGRAM(s) Oral before breakfast  polyethylene glycol 3350 17 Gram(s) Oral at bedtime  rivaroxaban 10 milliGRAM(s) Oral daily  rosuvastatin 5 milliGRAM(s) Oral at bedtime  senna 2 Tablet(s) Oral at bedtime  tranexamic acid IVPB 1000 milliGRAM(s) IV Intermittent once  tranexamic acid IVPB 1000 milliGRAM(s) IV Intermittent once    MEDICATIONS  (PRN):  albuterol    90 MICROgram(s) HFA Inhaler 2 Puff(s) Inhalation every 6 hours PRN Shortness of Breath and/or Wheezing  fentaNYL    Injectable 50 MICROGram(s) IV Push every 10 minutes PRN Severe Pain (7 - 10)  HYDROmorphone  Injectable 0.5 milliGRAM(s) IV Push every 3 hours PRN Breakthrough pain  ondansetron Injectable 4 milliGRAM(s) IV Push once PRN Nausea and/or Vomiting  ondansetron Injectable 4 milliGRAM(s) IV Push every 6 hours PRN Nausea and/or Vomiting  oxyCODONE    IR 5 milliGRAM(s) Oral every 3 hours PRN Moderate Pain (4 - 6)  oxyCODONE    IR 10 milliGRAM(s) Oral every 3 hours PRN Severe Pain (7 - 10)  oxyCODONE    IR 5 milliGRAM(s) Oral once PRN Moderate Pain (4 - 6)    02-05    136  |  104  |  10  ----------------------------<  138[H]  4.2   |  26  |  0.87    Ca    9.1      05 Feb 2025 21:52                          12.9   8.48  )-----------( 178      ( 05 Feb 2025 21:52 )             38.4

## 2025-02-06 NOTE — PHARMACOTHERAPY INTERVENTION NOTE - COMMENTS
Caprini 10  MSSA+ --> cefadroxil 500mg BID x7d  patient takes omeprazole 40mg Qday chronically - DC on this PPI
Admission medication reconciliation POD1

## 2025-02-06 NOTE — DISCHARGE NOTE NURSING/CASE MANAGEMENT/SOCIAL WORK - PATIENT PORTAL LINK FT
You can access the FollowMyHealth Patient Portal offered by [facility] by registering at the following website: [email or web address]. By joining [employer]’s FollowMyHealth portal, you will also be able to view your health information using other applications (apps) compatible with our system.

## 2025-02-06 NOTE — PHYSICAL THERAPY INITIAL EVALUATION ADULT - RANGE OF MOTION EXAMINATION, REHAB EVAL
R knee 0-90/bilateral upper extremity ROM was WFL (within functional limits)/Left LE ROM was WFL (within functional limits)

## 2025-02-06 NOTE — PHYSICAL THERAPY INITIAL EVALUATION ADULT - MODALITIES TREATMENT COMMENTS
Pt performed car transfer with supervision and verbal cues. Pt left seated in bedside chair, +chair alarm, all lines intact and RN aware of session outcome.

## 2025-02-07 LAB — SURGICAL PATHOLOGY STUDY: SIGNIFICANT CHANGE UP

## 2025-02-11 LAB
CULTURE RESULTS: SIGNIFICANT CHANGE UP
SPECIMEN SOURCE: SIGNIFICANT CHANGE UP

## 2025-02-12 ENCOUNTER — NON-APPOINTMENT (OUTPATIENT)
Age: 65
End: 2025-02-12

## 2025-02-13 DIAGNOSIS — T84.032A MECHANICAL LOOSENING OF INTERNAL RIGHT KNEE PROSTHETIC JOINT, INITIAL ENCOUNTER: ICD-10-CM

## 2025-02-13 DIAGNOSIS — J45.909 UNSPECIFIED ASTHMA, UNCOMPLICATED: ICD-10-CM

## 2025-02-13 DIAGNOSIS — R26.0 ATAXIC GAIT: ICD-10-CM

## 2025-02-13 DIAGNOSIS — H91.90 UNSPECIFIED HEARING LOSS, UNSPECIFIED EAR: ICD-10-CM

## 2025-02-13 DIAGNOSIS — N40.0 BENIGN PROSTATIC HYPERPLASIA WITHOUT LOWER URINARY TRACT SYMPTOMS: ICD-10-CM

## 2025-02-13 DIAGNOSIS — K21.9 GASTRO-ESOPHAGEAL REFLUX DISEASE WITHOUT ESOPHAGITIS: ICD-10-CM

## 2025-02-13 DIAGNOSIS — G47.30 SLEEP APNEA, UNSPECIFIED: ICD-10-CM

## 2025-02-13 DIAGNOSIS — M19.90 UNSPECIFIED OSTEOARTHRITIS, UNSPECIFIED SITE: ICD-10-CM

## 2025-02-13 DIAGNOSIS — E78.5 HYPERLIPIDEMIA, UNSPECIFIED: ICD-10-CM

## 2025-02-13 DIAGNOSIS — Z96.651 PRESENCE OF RIGHT ARTIFICIAL KNEE JOINT: ICD-10-CM

## 2025-02-13 DIAGNOSIS — E66.9 OBESITY, UNSPECIFIED: ICD-10-CM

## 2025-02-13 DIAGNOSIS — Z99.89 DEPENDENCE ON OTHER ENABLING MACHINES AND DEVICES: ICD-10-CM

## 2025-02-13 DIAGNOSIS — J32.9 CHRONIC SINUSITIS, UNSPECIFIED: ICD-10-CM

## 2025-02-18 PROBLEM — Z96.651 STATUS POST REVISION OF TOTAL REPLACEMENT OF RIGHT KNEE: Status: ACTIVE | Noted: 2025-02-18

## 2025-02-18 RX ORDER — OXYCODONE 5 MG/1
5 TABLET ORAL
Qty: 28 | Refills: 0 | Status: ACTIVE | COMMUNITY
Start: 2025-02-18 | End: 1900-01-01

## 2025-02-19 LAB
CULTURE RESULTS: SIGNIFICANT CHANGE UP
SPECIMEN SOURCE: SIGNIFICANT CHANGE UP

## 2025-02-25 ENCOUNTER — APPOINTMENT (OUTPATIENT)
Dept: ORTHOPEDIC SURGERY | Facility: CLINIC | Age: 65
End: 2025-02-25
Payer: MEDICARE

## 2025-02-25 VITALS
HEART RATE: 78 BPM | SYSTOLIC BLOOD PRESSURE: 133 MMHG | WEIGHT: 235 LBS | BODY MASS INDEX: 35.61 KG/M2 | HEIGHT: 68 IN | DIASTOLIC BLOOD PRESSURE: 73 MMHG

## 2025-02-25 DIAGNOSIS — Z96.651 PRESENCE OF RIGHT ARTIFICIAL KNEE JOINT: ICD-10-CM

## 2025-02-25 PROCEDURE — 99024 POSTOP FOLLOW-UP VISIT: CPT

## 2025-03-14 ENCOUNTER — NON-APPOINTMENT (OUTPATIENT)
Age: 65
End: 2025-03-14

## 2025-03-18 ENCOUNTER — APPOINTMENT (OUTPATIENT)
Dept: ORTHOPEDIC SURGERY | Facility: CLINIC | Age: 65
End: 2025-03-18
Payer: MEDICARE

## 2025-03-18 DIAGNOSIS — Z96.651 PRESENCE OF RIGHT ARTIFICIAL KNEE JOINT: ICD-10-CM

## 2025-03-18 PROCEDURE — 73562 X-RAY EXAM OF KNEE 3: CPT | Mod: RT

## 2025-03-18 PROCEDURE — 99024 POSTOP FOLLOW-UP VISIT: CPT

## 2025-05-06 ENCOUNTER — NON-APPOINTMENT (OUTPATIENT)
Age: 65
End: 2025-05-06

## 2025-05-27 ENCOUNTER — APPOINTMENT (OUTPATIENT)
Dept: ORTHOPEDIC SURGERY | Facility: CLINIC | Age: 65
End: 2025-05-27
Payer: MEDICARE

## 2025-05-27 DIAGNOSIS — M43.16 SPONDYLOLISTHESIS, LUMBAR REGION: ICD-10-CM

## 2025-05-27 DIAGNOSIS — Z96.651 PRESENCE OF RIGHT ARTIFICIAL KNEE JOINT: ICD-10-CM

## 2025-05-27 PROCEDURE — 73562 X-RAY EXAM OF KNEE 3: CPT | Mod: RT

## 2025-05-27 PROCEDURE — G2211 COMPLEX E/M VISIT ADD ON: CPT

## 2025-05-27 PROCEDURE — 99213 OFFICE O/P EST LOW 20 MIN: CPT

## 2025-05-27 PROCEDURE — 72100 X-RAY EXAM L-S SPINE 2/3 VWS: CPT

## 2025-06-05 ENCOUNTER — NON-APPOINTMENT (OUTPATIENT)
Age: 65
End: 2025-06-05

## 2025-08-26 ENCOUNTER — NON-APPOINTMENT (OUTPATIENT)
Age: 65
End: 2025-08-26

## 2025-08-28 ENCOUNTER — APPOINTMENT (OUTPATIENT)
Dept: ORTHOPEDIC SURGERY | Facility: CLINIC | Age: 65
End: 2025-08-28
Payer: MEDICARE

## 2025-08-28 DIAGNOSIS — Z96.651 PRESENCE OF RIGHT ARTIFICIAL KNEE JOINT: ICD-10-CM

## 2025-08-28 PROCEDURE — 73562 X-RAY EXAM OF KNEE 3: CPT | Mod: RT

## 2025-08-28 PROCEDURE — G2211 COMPLEX E/M VISIT ADD ON: CPT

## 2025-08-28 PROCEDURE — 99213 OFFICE O/P EST LOW 20 MIN: CPT

## 2025-09-08 ENCOUNTER — APPOINTMENT (OUTPATIENT)
Dept: CARDIOLOGY | Facility: CLINIC | Age: 65
End: 2025-09-08
Payer: MEDICARE

## 2025-09-08 VITALS
DIASTOLIC BLOOD PRESSURE: 68 MMHG | HEART RATE: 82 BPM | SYSTOLIC BLOOD PRESSURE: 120 MMHG | BODY MASS INDEX: 35.61 KG/M2 | HEIGHT: 68 IN | OXYGEN SATURATION: 97 % | WEIGHT: 235 LBS

## 2025-09-08 DIAGNOSIS — E78.5 HYPERLIPIDEMIA, UNSPECIFIED: ICD-10-CM

## 2025-09-08 DIAGNOSIS — I25.10 ATHEROSCLEROTIC HEART DISEASE OF NATIVE CORONARY ARTERY W/OUT ANGINA PECTORIS: ICD-10-CM

## 2025-09-08 PROCEDURE — 93000 ELECTROCARDIOGRAM COMPLETE: CPT

## 2025-09-08 PROCEDURE — 99214 OFFICE O/P EST MOD 30 MIN: CPT | Mod: 25

## (undated) DEVICE — TROCAR COVIDIEN VERSAPORT BLADELESS OPTICAL 11MM STANDARD

## (undated) DEVICE — SOL IRR POUR NS 0.9% 1000ML

## (undated) DEVICE — SOL IRR POUR H2O 1000ML

## (undated) DEVICE — SUT MAXON 2-0 30" GS-22

## (undated) DEVICE — SHEARS HARMONIC 1100 5MM X 36CM CURVED TIP

## (undated) DEVICE — TROCAR COVIDIEN VERSAONE BLUNT TIP HASSAN 12MM

## (undated) DEVICE — SUT SOFSILK 0 30" V-20

## (undated) DEVICE — PACK GENERAL LAPAROSCOPY

## (undated) DEVICE — STAPLER COVIDIEN ENDO GIA STANDARD HANDLE

## (undated) DEVICE — NDL HYPO SAFE 25G X 1.5" (ORANGE)

## (undated) DEVICE — SYR LUER LOK 10CC

## (undated) DEVICE — SPONGE ENDO PEANUT 5MM

## (undated) DEVICE — BLADE SURGICAL #15 CARBON

## (undated) DEVICE — SMOKE EVACUTATION SYS LAPROSCOPIC AC/PA

## (undated) DEVICE — SUT PDS II 0 27" CT-2

## (undated) DEVICE — DRAPE 3/4 SHEET W REINFORCEMENT 56X77"

## (undated) DEVICE — PLV-SCD MACHINE: Type: DURABLE MEDICAL EQUIPMENT

## (undated) DEVICE — SUT MONOCRYL 3-0 18" PS-2 UNDYED

## (undated) DEVICE — TUBING STRYKER PNEUMOSURE HI FLOW INSUFFLATOR

## (undated) DEVICE — SUT POLYSORB 0 30" GU-46

## (undated) DEVICE — VENODYNE/SCD SLEEVE CALF LARGE

## (undated) DEVICE — DRSG DERMABOND 0.7ML

## (undated) DEVICE — ELCTR BOVIE PENCIL SMOKE EVACUATION

## (undated) DEVICE — D HELP - CLEARVIEW CLEARIFY SYSTEM

## (undated) DEVICE — TROCAR COVIDIEN VERSAPORT BLADELESS OPTICAL 5MM STANDARD

## (undated) DEVICE — TUBING STRYKEFLOW II SUCTION / IRRIGATOR

## (undated) DEVICE — SUT SOFSILK 2-0 30" V-20

## (undated) DEVICE — SHEARS COVIDIEN ENDO SHEAR 5MM X 31CM W UNIPOLAR CAUTERY

## (undated) DEVICE — TROCAR COVIDIEN ENDO CLOSE SUTURING DEVICE

## (undated) DEVICE — WARMING BLANKET UPPER ADULT

## (undated) DEVICE — PLV/PSP-ESU FORCEFX F8I7418A: Type: DURABLE MEDICAL EQUIPMENT

## (undated) DEVICE — GLV 7 PROTEXIS (WHITE)

## (undated) DEVICE — VENODYNE/SCD SLEEVE CALF MEDIUM

## (undated) DEVICE — ELCTR BOVIE TIP BLADE INSULATED 2.75" EDGE

## (undated) DEVICE — TROCAR COVIDIEN VERSAONE FIXATION CANNULA 5MM

## (undated) DEVICE — SOL IRR BAG NS 0.9% 3000ML